# Patient Record
Sex: FEMALE | Race: WHITE | NOT HISPANIC OR LATINO | Employment: FULL TIME | URBAN - METROPOLITAN AREA
[De-identification: names, ages, dates, MRNs, and addresses within clinical notes are randomized per-mention and may not be internally consistent; named-entity substitution may affect disease eponyms.]

---

## 2019-10-11 ENCOUNTER — HOSPITAL ENCOUNTER (EMERGENCY)
Facility: HOSPITAL | Age: 49
Discharge: HOME | End: 2019-10-11
Attending: EMERGENCY MEDICINE

## 2019-10-11 VITALS
HEIGHT: 67 IN | HEART RATE: 74 BPM | WEIGHT: 178 LBS | RESPIRATION RATE: 18 BRPM | SYSTOLIC BLOOD PRESSURE: 150 MMHG | OXYGEN SATURATION: 98 % | TEMPERATURE: 98 F | BODY MASS INDEX: 27.94 KG/M2 | DIASTOLIC BLOOD PRESSURE: 58 MMHG

## 2019-10-11 DIAGNOSIS — S05.02XA ABRASION OF LEFT CORNEA, INITIAL ENCOUNTER: ICD-10-CM

## 2019-10-11 DIAGNOSIS — H10.32 ACUTE CONJUNCTIVITIS OF LEFT EYE, UNSPECIFIED ACUTE CONJUNCTIVITIS TYPE: ICD-10-CM

## 2019-10-11 DIAGNOSIS — H57.12 LEFT EYE PAIN: Primary | ICD-10-CM

## 2019-10-11 PROCEDURE — 25000003 PHARM REV CODE 250: Performed by: PHYSICIAN ASSISTANT

## 2019-10-11 PROCEDURE — 99284 EMERGENCY DEPT VISIT MOD MDM: CPT

## 2019-10-11 RX ORDER — IBUPROFEN 600 MG/1
600 TABLET ORAL EVERY 8 HOURS PRN
Qty: 15 TABLET | Refills: 0 | Status: SHIPPED | OUTPATIENT
Start: 2019-10-11 | End: 2023-04-10

## 2019-10-11 RX ORDER — OFLOXACIN 3 MG/ML
1 SOLUTION/ DROPS OPHTHALMIC EVERY 6 HOURS
Qty: 5 ML | Refills: 0 | Status: SHIPPED | OUTPATIENT
Start: 2019-10-11 | End: 2019-10-16

## 2019-10-11 RX ORDER — PROPARACAINE HYDROCHLORIDE 5 MG/ML
1 SOLUTION/ DROPS OPHTHALMIC
Status: COMPLETED | OUTPATIENT
Start: 2019-10-11 | End: 2019-10-11

## 2019-10-11 RX ORDER — HYDROCODONE BITARTRATE AND ACETAMINOPHEN 5; 325 MG/1; MG/1
1 TABLET ORAL EVERY 6 HOURS PRN
Qty: 3 TABLET | Refills: 0 | Status: SHIPPED | OUTPATIENT
Start: 2019-10-11 | End: 2023-04-10

## 2019-10-11 RX ORDER — OFLOXACIN 3 MG/ML
1 SOLUTION/ DROPS OPHTHALMIC ONCE
Status: COMPLETED | OUTPATIENT
Start: 2019-10-11 | End: 2019-10-11

## 2019-10-11 RX ADMIN — FLUORESCEIN SODIUM 1 EACH: 1 STRIP OPHTHALMIC at 02:10

## 2019-10-11 RX ADMIN — OFLOXACIN 1 DROP: 3 SOLUTION OPHTHALMIC at 03:10

## 2019-10-11 RX ADMIN — PROPARACAINE HYDROCHLORIDE 1 DROP: 5 SOLUTION/ DROPS OPHTHALMIC at 02:10

## 2019-10-11 NOTE — ED PROVIDER NOTES
Encounter Date: 10/11/2019       History     Chief Complaint   Patient presents with    Eye Pain     Reports pain and redness over the past couple days. Denies any known injury. Reports feels as though something is in her eye.      48 y/o female with history of HTN presents to the ER with chief complaint of left eye pain and redness x 2-3 days.  She rates her pain 4/10.  She describes burning pain. She has associated white drainage from the eye, foreign body sensation and photophobia with blurry vision.  She denies flashers or floaters in vision, injury to the eye, or current headache.  She had a headache 2 days ago, but this has resolved.  She denies nausea, vomiting, fever, chills.  The patient wears contacts and denies that she has been wearing them incorrectly.  She denies attempted treatment.  She has no other complaints at this time.        Review of patient's allergies indicates:   Allergen Reactions    Latex, natural rubber      Past Medical History:   Diagnosis Date    Hypertension      Past Surgical History:   Procedure Laterality Date    TUBAL LIGATION       History reviewed. No pertinent family history.  Social History     Tobacco Use    Smoking status: Former Smoker   Substance Use Topics    Alcohol use: Never     Frequency: Never    Drug use: Never     Review of Systems   Constitutional: Negative for chills and fever.   HENT: Negative for sore throat.    Eyes: Positive for photophobia, pain, discharge, redness and visual disturbance. Negative for itching.   Respiratory: Negative for shortness of breath.    Cardiovascular: Negative for chest pain.   Gastrointestinal: Negative for nausea and vomiting.   Genitourinary: Negative for dysuria.   Musculoskeletal: Negative for back pain.   Skin: Negative for color change, rash and wound.   Neurological: Positive for headaches (resolved). Negative for weakness.   Hematological: Does not bruise/bleed easily.   Psychiatric/Behavioral: Negative for  confusion.       Physical Exam     Initial Vitals [10/11/19 1342]   BP Pulse Resp Temp SpO2   (!) 150/58 74 18 98 °F (36.7 °C) 98 %      MAP       --         Physical Exam    Nursing note and vitals reviewed.  Constitutional: She appears well-developed and well-nourished.   HENT:   Head: Atraumatic.   Mouth/Throat: Oropharynx is clear and moist.   Eyes: EOM are normal. Pupils are equal, round, and reactive to light. Lids are everted and swept, no foreign bodies found. No foreign body present in the right eye. No foreign body present in the left eye. Right conjunctiva is not injected. Right conjunctiva has no hemorrhage. Left conjunctiva is injected. Left conjunctiva has no hemorrhage.   Slit lamp exam:       The right eye shows no corneal abrasion, no corneal ulcer, no foreign body, no hyphema, no hypopyon and no fluorescein uptake.        The left eye shows corneal abrasion ( small, 1x1 mm abrasion to 9 oclock positive of left cornea ) and fluorescein uptake. The left eye shows no corneal ulcer, no foreign body, no hyphema and no hypopyon.       tonopen exam left eye = 18,19,21    Right eye = 14, 14, 15   Neck: Normal range of motion. Neck supple.   Cardiovascular: Normal rate and regular rhythm.   Pulmonary/Chest: Breath sounds normal. No respiratory distress. She has no rhonchi. She has no rales.   Neurological: She is alert and oriented to person, place, and time.   Skin: No rash noted.   Psychiatric: She has a normal mood and affect.         ED Course   Procedures  Labs Reviewed - No data to display       Imaging Results    None                APC / Resident Notes:   The patient has a corneal abrasion to the left eye.  The patient has no signs of corneal ulcer, retained foreign body or iritis.  Her pain completely resolved with topical proparacaine.  Vision uncorrected is 20/70 in affected eye. Patient does not have glasses available.   Jacques-Pen exam shows eye pressure within normal limits.  Patient's  evaluation is most consistent with small corneal abrasion with conjunctivitis.  She is a contact lens wearer.  I will treat with ofloxacin.  She is given 1st dose in the ED and prescription for home use x5 days.  Patient is provided with ophthalmology clinic contact number to arrange for follow-up on Monday.  Will treat pain with NSAIDs and #3 tablets of Norco.     Patient is comfortable with plan.  She is given ER return precautions.  I discussed the care this patient with my supervising physician.           Attending Attestation:     Physician Attestation Statement for NP/PA:   I discussed this assessment and plan of this patient with the NP/PA, but I did not personally examine the patient. The face to face encounter was performed by the NP/PA.                     Clinical Impression:       ICD-10-CM ICD-9-CM   1. Left eye pain H57.12 379.91   2. Abrasion of left cornea, initial encounter S05.02XA 918.1   3. Acute conjunctivitis of left eye, unspecified acute conjunctivitis type H10.32 372.00                                LANI Vasques  10/11/19 1528       Rodney Johnson MD  10/11/19 [id number]

## 2023-04-05 ENCOUNTER — HOSPITAL ENCOUNTER (EMERGENCY)
Facility: HOSPITAL | Age: 53
Discharge: HOME | End: 2023-04-05
Attending: INTERNAL MEDICINE
Payer: MEDICAID

## 2023-04-05 VITALS
HEIGHT: 66 IN | TEMPERATURE: 98 F | DIASTOLIC BLOOD PRESSURE: 96 MMHG | WEIGHT: 230 LBS | RESPIRATION RATE: 17 BRPM | SYSTOLIC BLOOD PRESSURE: 159 MMHG | OXYGEN SATURATION: 98 % | HEART RATE: 88 BPM | BODY MASS INDEX: 36.96 KG/M2

## 2023-04-05 DIAGNOSIS — R10.9 ABDOMINAL PAIN, UNSPECIFIED ABDOMINAL LOCATION: Primary | ICD-10-CM

## 2023-04-05 DIAGNOSIS — R79.89 ELEVATED LFTS: ICD-10-CM

## 2023-04-05 DIAGNOSIS — R07.9 CHEST PAIN: ICD-10-CM

## 2023-04-05 DIAGNOSIS — K59.00 CONSTIPATION, UNSPECIFIED CONSTIPATION TYPE: ICD-10-CM

## 2023-04-05 LAB
ALBUMIN SERPL-MCNC: 3.6 G/DL (ref 3.5–5)
ALBUMIN/GLOB SERPL: 0.7 RATIO (ref 1.1–2)
ALP SERPL-CCNC: 100 UNIT/L (ref 40–150)
ALT SERPL-CCNC: 211 UNIT/L (ref 0–55)
AMORPH URATE CRY URNS QL MICRO: ABNORMAL /HPF
APPEARANCE UR: CLEAR
AST SERPL-CCNC: 230 UNIT/L (ref 5–34)
BACTERIA #/AREA URNS AUTO: ABNORMAL /HPF
BASOPHILS # BLD AUTO: 0.04 X10(3)/MCL (ref 0–0.2)
BASOPHILS NFR BLD AUTO: 0.7 %
BILIRUB UR QL STRIP.AUTO: ABNORMAL MG/DL
BILIRUBIN DIRECT+TOT PNL SERPL-MCNC: 0.6 MG/DL
BUN SERPL-MCNC: 10 MG/DL (ref 9.8–20.1)
CALCIUM SERPL-MCNC: 10.3 MG/DL (ref 8.4–10.2)
CHLORIDE SERPL-SCNC: 116 MMOL/L (ref 98–107)
CO2 SERPL-SCNC: 25 MMOL/L (ref 22–29)
COLOR UR AUTO: YELLOW
CREAT SERPL-MCNC: 0.75 MG/DL (ref 0.55–1.02)
EOSINOPHIL # BLD AUTO: 0.24 X10(3)/MCL (ref 0–0.9)
EOSINOPHIL NFR BLD AUTO: 4.4 %
ERYTHROCYTE [DISTWIDTH] IN BLOOD BY AUTOMATED COUNT: 12.3 % (ref 11.5–17)
GFR SERPLBLD CREATININE-BSD FMLA CKD-EPI: >60 MLS/MIN/1.73/M2
GLOBULIN SER-MCNC: 5.3 GM/DL (ref 2.4–3.5)
GLUCOSE SERPL-MCNC: 110 MG/DL (ref 74–100)
GLUCOSE UR QL STRIP.AUTO: NEGATIVE MG/DL
HCT VFR BLD AUTO: 46.7 % (ref 37–47)
HGB BLD-MCNC: 15.6 G/DL (ref 12–16)
IMM GRANULOCYTES # BLD AUTO: 0.02 X10(3)/MCL (ref 0–0.04)
IMM GRANULOCYTES NFR BLD AUTO: 0.4 %
KETONES UR QL STRIP.AUTO: NEGATIVE MG/DL
LEUKOCYTE ESTERASE UR QL STRIP.AUTO: NEGATIVE UNIT/L
LIPASE SERPL-CCNC: 21 U/L
LYMPHOCYTES # BLD AUTO: 1.49 X10(3)/MCL (ref 0.6–4.6)
LYMPHOCYTES NFR BLD AUTO: 27.5 %
MCH RBC QN AUTO: 32.4 PG (ref 27–31)
MCHC RBC AUTO-ENTMCNC: 33.4 G/DL (ref 33–36)
MCV RBC AUTO: 97.1 FL (ref 80–94)
MONOCYTES # BLD AUTO: 0.73 X10(3)/MCL (ref 0.1–1.3)
MONOCYTES NFR BLD AUTO: 13.5 %
MUCOUS THREADS URNS QL MICRO: ABNORMAL /LPF
NEUTROPHILS # BLD AUTO: 2.9 X10(3)/MCL (ref 2.1–9.2)
NEUTROPHILS NFR BLD AUTO: 53.5 %
NITRITE UR QL STRIP.AUTO: NEGATIVE
PH UR STRIP.AUTO: 5.5 [PH]
PLATELET # BLD AUTO: 191 X10(3)/MCL (ref 130–400)
PMV BLD AUTO: 10.2 FL (ref 7.4–10.4)
POTASSIUM SERPL-SCNC: 4.5 MMOL/L (ref 3.5–5.1)
PROT SERPL-MCNC: 8.9 GM/DL (ref 6.4–8.3)
PROT UR QL STRIP.AUTO: ABNORMAL MG/DL
RBC # BLD AUTO: 4.81 X10(6)/MCL (ref 4.2–5.4)
RBC #/AREA URNS AUTO: ABNORMAL /HPF
RBC UR QL AUTO: NEGATIVE UNIT/L
SODIUM SERPL-SCNC: 154 MMOL/L (ref 136–145)
SP GR UR STRIP.AUTO: >=1.03
SQUAMOUS #/AREA URNS AUTO: ABNORMAL /HPF
TROPONIN I SERPL-MCNC: <0.01 NG/ML (ref 0–0.04)
TSH SERPL-ACNC: 3.27 UIU/ML (ref 0.35–4.94)
UROBILINOGEN UR STRIP-ACNC: 2 MG/DL
WBC # SPEC AUTO: 5.4 X10(3)/MCL (ref 4.5–11.5)
WBC #/AREA URNS AUTO: ABNORMAL /HPF

## 2023-04-05 PROCEDURE — 25500020 PHARM REV CODE 255: Performed by: NURSE PRACTITIONER

## 2023-04-05 PROCEDURE — 84443 ASSAY THYROID STIM HORMONE: CPT | Performed by: NURSE PRACTITIONER

## 2023-04-05 PROCEDURE — 81001 URINALYSIS AUTO W/SCOPE: CPT | Performed by: NURSE PRACTITIONER

## 2023-04-05 PROCEDURE — 99285 EMERGENCY DEPT VISIT HI MDM: CPT | Mod: 25

## 2023-04-05 PROCEDURE — 83690 ASSAY OF LIPASE: CPT | Performed by: NURSE PRACTITIONER

## 2023-04-05 PROCEDURE — 80053 COMPREHEN METABOLIC PANEL: CPT | Performed by: NURSE PRACTITIONER

## 2023-04-05 PROCEDURE — 85025 COMPLETE CBC W/AUTO DIFF WBC: CPT | Performed by: NURSE PRACTITIONER

## 2023-04-05 PROCEDURE — 84484 ASSAY OF TROPONIN QUANT: CPT | Performed by: NURSE PRACTITIONER

## 2023-04-05 RX ORDER — LACTULOSE 10 G/15ML
20 SOLUTION ORAL 3 TIMES DAILY
Qty: 270 ML | Refills: 0 | Status: SHIPPED | OUTPATIENT
Start: 2023-04-05 | End: 2023-04-08

## 2023-04-05 RX ADMIN — IOPAMIDOL 100 ML: 755 INJECTION, SOLUTION INTRAVENOUS at 02:04

## 2023-04-05 NOTE — ED PROVIDER NOTES
Encounter Date: 4/5/2023       History     Chief Complaint   Patient presents with    Abdominal Pain     Epigastric pain for a couple days with nausea, denies vomiting.     53-year-old female presents to the emergency department complaints of epigastric pain for couple days with nausea denies any vomiting.  She does report pain to the abdomen that is generalized and does not report any specific point tenderness.  She denies any urinary problems.  She does report trouble with bowels in the last several days including having to strain with even some light blood with wiping as well as incomplete emptying feeling.  She denies any fevers chills.  She reports the pain is a cramping like pain that comes and goes and is sometimes associated with eating but also can just be on an empty stomach.  She denies any other complaints or associated symptoms.  She denies any worsening or alleviating factors.    Review of patient's allergies indicates:   Allergen Reactions    Latex, natural rubber      Past Medical History:   Diagnosis Date    Hypertension      Past Surgical History:   Procedure Laterality Date    TUBAL LIGATION       No family history on file.  Social History     Tobacco Use    Smoking status: Former   Substance Use Topics    Alcohol use: Never    Drug use: Never     Review of Systems   Constitutional:  Negative for activity change, appetite change and fever.   HENT:  Negative for congestion, dental problem and sore throat.    Eyes:  Negative for discharge and itching.   Respiratory:  Negative for apnea, chest tightness and shortness of breath.    Cardiovascular:  Negative for chest pain.   Gastrointestinal:  Positive for abdominal pain, constipation and nausea. Negative for abdominal distention.   Endocrine: Negative for cold intolerance and heat intolerance.   Genitourinary:  Negative for dysuria, vaginal bleeding, vaginal discharge and vaginal pain.   Musculoskeletal:  Negative for back pain.   Skin:  Negative for  rash.   Neurological:  Negative for dizziness, facial asymmetry and weakness.   Hematological:  Does not bruise/bleed easily.   Psychiatric/Behavioral:  Negative for agitation.    All other systems reviewed and are negative.    Physical Exam     Initial Vitals [04/05/23 1108]   BP Pulse Resp Temp SpO2   (!) 152/126 82 18 98.2 °F (36.8 °C) 96 %      MAP       --         Physical Exam    Nursing note and vitals reviewed.  Constitutional: Vital signs are normal. She appears well-developed and well-nourished.  Non-toxic appearance. She does not have a sickly appearance.   HENT:   Head: Normocephalic and atraumatic.   Right Ear: External ear normal.   Left Ear: External ear normal.   Eyes: Conjunctivae, EOM and lids are normal. Lids are everted and swept, no foreign bodies found.   Neck: Trachea normal and phonation normal. Neck supple. No thyroid mass and no thyromegaly present.   Normal range of motion.   Full passive range of motion without pain.     Cardiovascular:  Normal rate, regular rhythm, S1 normal, S2 normal, normal heart sounds, intact distal pulses and normal pulses.           Pulmonary/Chest: Breath sounds normal. No respiratory distress.   Abdominal: Abdomen is soft. She exhibits distension. She exhibits no mass. There is abdominal tenderness.   Distended abdomen with hypoactive bowel sounds.  Patient does report tenderness with palpation but it is noted diffusely throughout the abdomen and there is no localization of pain. There is no rebound and no guarding.   Musculoskeletal:         General: No tenderness or edema. Normal range of motion.      Cervical back: Full passive range of motion without pain, normal range of motion and neck supple.     Lymphadenopathy:     She has no cervical adenopathy.   Neurological: She is alert and oriented to person, place, and time. She has normal strength.   Skin: Skin is warm, dry and intact. Capillary refill takes less than 2 seconds.   Psychiatric: She has a normal  mood and affect. Her speech is normal and behavior is normal. Judgment normal. Cognition and memory are normal.       ED Course   Procedures  Labs Reviewed   COMPREHENSIVE METABOLIC PANEL - Abnormal; Notable for the following components:       Result Value    Sodium Level 154 (*)     Chloride 116 (*)     Glucose Level 110 (*)     Calcium Level Total 10.3 (*)     Protein Total 8.9 (*)     Globulin 5.3 (*)     Albumin/Globulin Ratio 0.7 (*)     Alanine Aminotransferase 211 (*)     Aspartate Aminotransferase 230 (*)     All other components within normal limits   URINALYSIS, REFLEX TO URINE CULTURE - Abnormal; Notable for the following components:    Protein, UA 1+ (*)     Bilirubin, UA 1+ (*)     Urobilinogen, UA 2.0 (*)     All other components within normal limits   CBC WITH DIFFERENTIAL - Abnormal; Notable for the following components:    MCV 97.1 (*)     MCH 32.4 (*)     All other components within normal limits   URINALYSIS, MICROSCOPIC - Abnormal; Notable for the following components:    Mucous, UA Small (*)     Amporphous Urate Crystals, UA Few (*)     Squamous Epithelial Cells, UA Many (*)     All other components within normal limits   LIPASE - Normal   TROPONIN I - Normal   TSH - Normal   CBC W/ AUTO DIFFERENTIAL    Narrative:     The following orders were created for panel order CBC auto differential.  Procedure                               Abnormality         Status                     ---------                               -----------         ------                     CBC with Differential[[id number]]        Abnormal            Final result                 Please view results for these tests on the individual orders.          Imaging Results              CT Abdomen Pelvis With Contrast (Final result)  Result time 04/05/23 15:09:35      Final result by Iván Quiroz MD (04/05/23 15:09:35)                   Impression:      1. Moderately distended gallbladder  2. Diverticulosis coli  3. Atelectasis,  scarring, and calcified granulomas at the lung bases  4. Mild hepatomegaly with heterogeneous low-attenuation within the hepatic parenchyma this may represent steatosis with scattered fatty sparing.  Other etiology cannot be excluded.  A follow-up MR examination would allow further evaluation.  5. Atherosclerosis  6. Thoracolumbar spondylosis with bilateral spondylolysis L5 and grade 1 anterolisthesis of L5 on S1  7. The appendix is not identified with certainty.  A repeat exam with oral contrast would allow further evaluation if clinically indicated      Electronically signed by: Iván Quiroz  Date:    04/05/2023  Time:    15:09               Narrative:    EXAMINATION:  CT ABDOMEN PELVIS WITH CONTRAST    CLINICAL HISTORY:  Abdominal abscess/infection suspected;, .    TECHNIQUE:  PATIENT RADIATION DOSE: DLP(mGycm) : 1033    As per PQRS measures, all CT scans at this facility used dose modulation, iterative reconstruction, and/or weight based dose adjustment when appropriate to reduce radiation dose to as low as reasonably achievable.    COMPARISON:  None available    FINDINGS:  Serial axial images were obtained through the abdomen and pelvis with the administration of  IV contrast. Coronal and sagittal reconstructions where also obtained. Degenerative changes are noted to the thoracolumbar spine.  There is bilateral spondylolysis at L5 and grade 1 anterolisthesis of L5 on S1.  The heart is normal in size.  Atelectasis, scarring, and calcified granulomas are evident at the lung bases.  The liver is mild enlarged and demonstrates patchy heterogeneous parenchymal low-attenuation.  The gallbladder is mildly distended.  The spleen, adrenal glands, and pancreas are grossly within normal limits.  Atherosclerosis is seen within the aorta and branching vessels.  Scattered small lymph nodes seen within the periaortic or pericaval region.  The kidneys are relatively symmetric in size.  There is bilateral renal lobulation.  No  hydronephrosis is seen.  No dilated loops of bowel are identified.  Gas and feces are seen within the colon.  The appendix is not identified with certainty.  Scattered diverticula are noted to the descending and sigmoid colon.  The uterus is normal in size.  There is fluid density present at the endometrium.  There are suspect small follicles at the ovaries bilaterally.  There is mild ill-defined soft tissue prominence at the cervical vaginal region.  A fecal bolus is present within the rectum.  No significant free fluid collection is seen.                                       X-Ray Chest 1 View (Final result)  Result time 04/05/23 14:26:46      Final result by Iván Quiroz MD (04/05/23 14:26:46)                   Impression:      1. No active cardiopulmonary disease identified      Electronically signed by: Iván Quiroz  Date:    04/05/2023  Time:    14:26               Narrative:    EXAMINATION:  XR CHEST 1 VIEW    CLINICAL HISTORY:  , Chest pain, unspecified.    COMPARISON:  None available    FINDINGS:  An AP view or more reveals the heart to be normal in size.  The trachea is midline.  No definite infiltrate or effusion is seen.  Bony structures appear grossly intact.                                       Medications   iopamidoL (ISOVUE-370) injection 100 mL (100 mLs Intravenous Given 4/5/23 1407)                       Medical Decision Making  Patient is a 53-year-old female presents emerged department with abdominal pain mainly epigastric as well as some nausea with no vomiting.  States trouble with bowel movements in the last several days.  Denies any fevers chills.    Problems Addressed:  Abdominal pain, unspecified abdominal location: acute illness or injury     Details: CT was done which showed fecal bolus as well as gas and stool throughout colon.  Will treat for constipation on outpatient basis.  Will refer for LFTs being elevated.  Constipation, unspecified constipation type: acute illness or  injury     Details: Will treat with oral lactulose and discussed over-the-counter p.r.n. use of enema per rectum.  Elevated LFTs: acute illness or injury     Details: Will refer to [facility] for elevated LFT follow-up.    Amount and/or Complexity of Data Reviewed  External Data Reviewed: labs and notes.  Labs: ordered. Decision-making details documented in ED Course.  Radiology: ordered. Decision-making details documented in ED Course.  Discussion of management or test interpretation with external provider(s): Patient had workup including labs and CTs.  No acute findings other than fecal bolus and constipation seen.  Did discuss elevated LFTs and fatty liver seen on CT with patient and referred for primary care follow-up.  Discussed lactulose for constipation as well as over-the-counter enemas.  Patient was aware plan of care and had no questions concerns prior to discharge.            Clinical Impression:   Final diagnoses:  [R10.9] Abdominal pain, unspecified abdominal location (Primary)  [K59.00] Constipation, unspecified constipation type  [R79.89] Elevated LFTs        ED Disposition Condition    Discharge Stable          ED Prescriptions       Medication Sig Dispense Start Date End Date Auth. Provider    lactulose (CHRONULAC) 20 gram/30 mL Soln Take 30 mLs (20 g total) by mouth 3 (three) times daily. for 3 days 270 mL 4/5/2023 4/8/2023 CLEMENTE Li          Follow-up Information       Follow up With Specialties Details Why Contact Info Additional Information    [facility] [place] as soon as possible for a visit  As needed, For ER Follow Up. [address] [facility] [id number] [phone] [facility]             CLEMENTE Li  04/05/23 0517

## 2023-04-10 ENCOUNTER — OFFICE VISIT (OUTPATIENT)
Dept: FAMILY MEDICINE | Facility: CLINIC | Age: 53
End: 2023-04-10
Payer: MEDICAID

## 2023-04-10 VITALS
SYSTOLIC BLOOD PRESSURE: 136 MMHG | TEMPERATURE: 98 F | DIASTOLIC BLOOD PRESSURE: 82 MMHG | HEIGHT: 66 IN | HEART RATE: 76 BPM | RESPIRATION RATE: 20 BRPM | BODY MASS INDEX: 39.15 KG/M2 | WEIGHT: 243.63 LBS | OXYGEN SATURATION: 98 %

## 2023-04-10 DIAGNOSIS — Z12.4 ENCOUNTER FOR PAPANICOLAOU SMEAR FOR CERVICAL CANCER SCREENING: ICD-10-CM

## 2023-04-10 DIAGNOSIS — Z00.00 WELLNESS EXAMINATION: ICD-10-CM

## 2023-04-10 DIAGNOSIS — R10.9 ABDOMINAL PAIN, UNSPECIFIED ABDOMINAL LOCATION: ICD-10-CM

## 2023-04-10 DIAGNOSIS — I10 HYPERTENSION, UNSPECIFIED TYPE: ICD-10-CM

## 2023-04-10 DIAGNOSIS — R05.9 COUGH, UNSPECIFIED TYPE: ICD-10-CM

## 2023-04-10 DIAGNOSIS — B35.1 NAIL FUNGUS: ICD-10-CM

## 2023-04-10 DIAGNOSIS — Z76.89 ESTABLISHING CARE WITH NEW DOCTOR, ENCOUNTER FOR: Primary | ICD-10-CM

## 2023-04-10 DIAGNOSIS — Z87.898 HISTORY OF NASAL CONGESTION: ICD-10-CM

## 2023-04-10 DIAGNOSIS — R79.89 ELEVATED LFTS: ICD-10-CM

## 2023-04-10 DIAGNOSIS — Z12.39 ENCOUNTER FOR SCREENING FOR MALIGNANT NEOPLASM OF BREAST, UNSPECIFIED SCREENING MODALITY: ICD-10-CM

## 2023-04-10 DIAGNOSIS — K59.00 CONSTIPATION, UNSPECIFIED CONSTIPATION TYPE: ICD-10-CM

## 2023-04-10 DIAGNOSIS — R09.82 POST-NASAL DRIP: ICD-10-CM

## 2023-04-10 PROCEDURE — 99204 PR OFFICE/OUTPT VISIT, NEW, LEVL IV, 45-59 MIN: ICD-10-PCS | Mod: S$PBB,,, | Performed by: REGISTERED NURSE

## 2023-04-10 PROCEDURE — 3075F SYST BP GE 130 - 139MM HG: CPT | Mod: CPTII,,, | Performed by: REGISTERED NURSE

## 2023-04-10 PROCEDURE — 3008F PR BODY MASS INDEX (BMI) DOCUMENTED: ICD-10-PCS | Mod: CPTII,,, | Performed by: REGISTERED NURSE

## 2023-04-10 PROCEDURE — 96372 THER/PROPH/DIAG INJ SC/IM: CPT | Mod: PBBFAC

## 2023-04-10 PROCEDURE — 3075F PR MOST RECENT SYSTOLIC BLOOD PRESS GE 130-139MM HG: ICD-10-PCS | Mod: CPTII,,, | Performed by: REGISTERED NURSE

## 2023-04-10 PROCEDURE — 99215 OFFICE O/P EST HI 40 MIN: CPT | Mod: PBBFAC,25 | Performed by: REGISTERED NURSE

## 2023-04-10 PROCEDURE — 3079F DIAST BP 80-89 MM HG: CPT | Mod: CPTII,,, | Performed by: REGISTERED NURSE

## 2023-04-10 PROCEDURE — 99204 OFFICE O/P NEW MOD 45 MIN: CPT | Mod: S$PBB,,, | Performed by: REGISTERED NURSE

## 2023-04-10 PROCEDURE — 99999 PR PBB SHADOW E&M-EST. PATIENT-LVL V: CPT | Mod: PBBFAC,,, | Performed by: REGISTERED NURSE

## 2023-04-10 PROCEDURE — 99999 PR PBB SHADOW E&M-EST. PATIENT-LVL V: ICD-10-PCS | Mod: PBBFAC,,, | Performed by: REGISTERED NURSE

## 2023-04-10 PROCEDURE — 3079F PR MOST RECENT DIASTOLIC BLOOD PRESSURE 80-89 MM HG: ICD-10-PCS | Mod: CPTII,,, | Performed by: REGISTERED NURSE

## 2023-04-10 PROCEDURE — 3008F BODY MASS INDEX DOCD: CPT | Mod: CPTII,,, | Performed by: REGISTERED NURSE

## 2023-04-10 RX ORDER — METOPROLOL SUCCINATE 25 MG/1
25 TABLET, EXTENDED RELEASE ORAL
COMMUNITY
Start: 2022-12-07 | End: 2023-04-10 | Stop reason: SDUPTHER

## 2023-04-10 RX ORDER — BETAMETHASONE SODIUM PHOSPHATE AND BETAMETHASONE ACETATE 3; 3 MG/ML; MG/ML
12 INJECTION, SUSPENSION INTRA-ARTICULAR; INTRALESIONAL; INTRAMUSCULAR; SOFT TISSUE
Status: COMPLETED | OUTPATIENT
Start: 2023-04-10 | End: 2023-04-10

## 2023-04-10 RX ORDER — LACTULOSE 10 G/15ML
SOLUTION ORAL
COMMUNITY
Start: 2023-04-05 | End: 2023-07-17

## 2023-04-10 RX ORDER — AZITHROMYCIN 250 MG/1
TABLET, FILM COATED ORAL
Qty: 6 TABLET | Refills: 0 | Status: SHIPPED | OUTPATIENT
Start: 2023-04-10 | End: 2023-04-15

## 2023-04-10 RX ORDER — METOPROLOL SUCCINATE 25 MG/1
25 TABLET, EXTENDED RELEASE ORAL DAILY
Qty: 30 TABLET | Refills: 3 | Status: SHIPPED | OUTPATIENT
Start: 2023-04-10 | End: 2023-08-11

## 2023-04-10 RX ADMIN — BETAMETHASONE SODIUM PHOSPHATE AND BETAMETHASONE ACETATE 12 MG: 3; 3 INJECTION, SUSPENSION INTRA-ARTICULAR; INTRALESIONAL; INTRAMUSCULAR at 11:04

## 2023-04-10 NOTE — PROGRESS NOTES
Subjective     Patient ID: Raquel Byrne is a 53 y.o. female.    Chief Complaint: Establish Care (Er follow up abdominal pain and possible covid. Unable to pass bm. Last good bm was Friday. Taking stool softners), Hypertension (Needs medication refills unable to fill for a month), Cough, Sinus Problem, Sore Throat, decrease smell and taste, and Headache    Patient is a 53-year-old female here today to establish care.  Patient has past medical history of obesity and hypertension.  Patient complaining of cough, congestion, headache, postnasal drip and sore throat x1 week.  Patient denies fever, chills and malaise at this time.  Denies recent sick contact.  Patient was seen in ER on April 5th for abdominal and chest pain CT of abdomen was completed which revealed hepatomegaly and constipation, liver enzymes elevated.  Patient denies pain/Tylenol use alcohol use and/or history of hep C.  Patient complaining of mid gastric abdominal pain, denies nausea and vomiting at this time.    Cough    Sinus Problem    Sore Throat     Headache     Review of Systems       Objective     Physical Exam  Constitutional:       Appearance: Normal appearance. She is obese.   HENT:      Head: Normocephalic and atraumatic.      Right Ear: Hearing, tympanic membrane, ear canal and external ear normal.      Left Ear: Hearing, tympanic membrane, ear canal and external ear normal.      Nose: Congestion present.      Right Sinus: Frontal sinus tenderness present.      Left Sinus: Frontal sinus tenderness present.      Mouth/Throat:      Lips: Pink.      Mouth: Mucous membranes are moist.      Pharynx: Posterior oropharyngeal erythema present. No oropharyngeal exudate.      Tonsils: No tonsillar exudate or tonsillar abscesses.   Eyes:      Pupils: Pupils are equal, round, and reactive to light.   Cardiovascular:      Rate and Rhythm: Regular rhythm.      Pulses: Normal pulses.      Heart sounds: Normal heart sounds.   Pulmonary:      Effort:  Pulmonary effort is normal.      Breath sounds: Normal breath sounds.   Abdominal:      General: Abdomen is flat. Bowel sounds are normal. There is distension.      Palpations: Abdomen is soft. There is no mass.      Tenderness: There is abdominal tenderness. There is no rebound.      Hernia: No hernia is present.   Musculoskeletal:         General: Normal range of motion.      Cervical back: Normal range of motion and neck supple.   Feet:      Right foot:      Toenail Condition: Right toenails are abnormally thick. Fungal disease present.     Left foot:      Toenail Condition: Left toenails are abnormally thick. Fungal disease present.  Skin:     General: Skin is warm.      Capillary Refill: Capillary refill takes less than 2 seconds.   Neurological:      General: No focal deficit present.      Mental Status: She is alert and oriented to person, place, and time.        Assessment and Plan     Problem List Items Addressed This Visit          ENT    Post-nasal drip    Relevant Medications    azithromycin (Z-RIGO) 250 MG tablet       GI    Elevated LFTs    Relevant Orders    Ambulatory referral/consult to Gastroenterology    AST (SGOT)    ALT (SGPT)    Hepatitis C Antibody       Other    Establishing care with new doctor, encounter for - Primary     Other Visit Diagnoses       Abdominal pain, unspecified abdominal location        Relevant Orders    Ambulatory referral/consult to Gastroenterology    Encounter for screening for malignant neoplasm of breast, unspecified screening modality        Relevant Orders    Mammo Digital Screening Bilat w/ Tom    Hypertension, unspecified type        Relevant Medications    metoprolol succinate (TOPROL-XL) 25 MG 24 hr tablet    Cough, unspecified type        Relevant Medications    betamethasone acetate-betamethasone sodium phosphate injection 12 mg (Completed)    azithromycin (Z-RIGO) 250 MG tablet    History of nasal congestion        Relevant Medications    betamethasone  acetate-betamethasone sodium phosphate injection 12 mg (Completed)    azithromycin (Z-RIGO) 250 MG tablet    Wellness examination        Relevant Orders    Lipid Panel    Hemoglobin A1C    HIV 1/2 Ag/Ab (4th Gen)    Encounter for Papanicolaou smear for cervical cancer screening        Relevant Orders    Ambulatory referral/consult to Gynecology    Constipation, unspecified constipation type        Relevant Medications    linaCLOtide (LINZESS) 145 mcg Cap capsule    Nail fungus        Relevant Orders    Ambulatory referral/consult to Podiatry        Hypertension-blood pressure controlled on current medication regimen, continue.  Medication refills sent to pharmacy on file.  Low-sodium diet encouraged.    Obesity-Body mass index is 39.32 kg/m². Morbid obesity complicates all aspects of disease management from diagnostic modalities to treatment. Weight loss encouraged and health benefits explained to patient.      Cough, nasal congestion, postnasal drip-betamethasone 12 mg IM given in clinic, Z-Rigo sent to pharmacy on file, take as directed.  Use OTC Claritin or Zyrtec as directed.  Increase fluids as tolerated    Constipation-increase water intake and fiber in diet, Linzess 145 mcg p.o. q.day sent to pharmacy on file, take as directed.  GI referral sent.     Nail fungus-podiatry referral sent today    Elevated liver enzymes-patient instructed to stay away from Tylenol containing products, repeat liver enzymes to be drawn in 2 weeks, GI referral sent today.    Return to clinic in 2 weeks for wellness, labs to be completed prior to visit.

## 2023-04-21 ENCOUNTER — LAB VISIT (OUTPATIENT)
Dept: LAB | Facility: HOSPITAL | Age: 53
End: 2023-04-21
Attending: REGISTERED NURSE
Payer: MEDICAID

## 2023-04-21 DIAGNOSIS — Z00.00 WELLNESS EXAMINATION: ICD-10-CM

## 2023-04-21 DIAGNOSIS — R79.89 ELEVATED LFTS: ICD-10-CM

## 2023-04-21 LAB
ALT SERPL-CCNC: 150 UNIT/L (ref 0–55)
AST SERPL-CCNC: 101 UNIT/L (ref 5–34)
CHOLEST SERPL-MCNC: 209 MG/DL
CHOLEST/HDLC SERPL: 4 {RATIO} (ref 0–5)
EST. AVERAGE GLUCOSE BLD GHB EST-MCNC: 171.4 MG/DL
HBA1C MFR BLD: 7.6 %
HCV AB SERPL QL IA: NONREACTIVE
HDLC SERPL-MCNC: 59 MG/DL (ref 35–60)
HIV 1+2 AB+HIV1 P24 AG SERPL QL IA: NONREACTIVE
LDLC SERPL CALC-MCNC: 134 MG/DL (ref 50–140)
TRIGL SERPL-MCNC: 81 MG/DL (ref 37–140)
VLDLC SERPL CALC-MCNC: 16 MG/DL

## 2023-04-21 PROCEDURE — 80061 LIPID PANEL: CPT

## 2023-04-21 PROCEDURE — 86803 HEPATITIS C AB TEST: CPT

## 2023-04-21 PROCEDURE — 84450 TRANSFERASE (AST) (SGOT): CPT

## 2023-04-21 PROCEDURE — 36415 COLL VENOUS BLD VENIPUNCTURE: CPT

## 2023-04-21 PROCEDURE — 84460 ALANINE AMINO (ALT) (SGPT): CPT

## 2023-04-21 PROCEDURE — 87389 HIV-1 AG W/HIV-1&-2 AB AG IA: CPT

## 2023-04-21 PROCEDURE — 83036 HEMOGLOBIN GLYCOSYLATED A1C: CPT

## 2023-04-24 ENCOUNTER — OFFICE VISIT (OUTPATIENT)
Dept: FAMILY MEDICINE | Facility: CLINIC | Age: 53
End: 2023-04-24
Payer: MEDICAID

## 2023-04-24 VITALS
BODY MASS INDEX: 38.94 KG/M2 | HEART RATE: 74 BPM | HEIGHT: 66 IN | WEIGHT: 242.31 LBS | SYSTOLIC BLOOD PRESSURE: 138 MMHG | TEMPERATURE: 98 F | RESPIRATION RATE: 18 BRPM | DIASTOLIC BLOOD PRESSURE: 85 MMHG | OXYGEN SATURATION: 99 %

## 2023-04-24 DIAGNOSIS — R10.10 PAIN OF UPPER ABDOMEN: ICD-10-CM

## 2023-04-24 DIAGNOSIS — B35.1 NAIL FUNGUS: ICD-10-CM

## 2023-04-24 DIAGNOSIS — Z00.00 WELLNESS EXAMINATION: ICD-10-CM

## 2023-04-24 DIAGNOSIS — R45.86 REBOUND MOOD SWINGS: ICD-10-CM

## 2023-04-24 DIAGNOSIS — E66.9 OBESITY (BMI 35.0-39.9 WITHOUT COMORBIDITY): ICD-10-CM

## 2023-04-24 DIAGNOSIS — E11.9 TYPE 2 DIABETES MELLITUS WITHOUT COMPLICATION, WITHOUT LONG-TERM CURRENT USE OF INSULIN: Primary | ICD-10-CM

## 2023-04-24 PROCEDURE — 3008F BODY MASS INDEX DOCD: CPT | Mod: CPTII,,, | Performed by: REGISTERED NURSE

## 2023-04-24 PROCEDURE — 3008F PR BODY MASS INDEX (BMI) DOCUMENTED: ICD-10-PCS | Mod: CPTII,,, | Performed by: REGISTERED NURSE

## 2023-04-24 PROCEDURE — 99999 PR PBB SHADOW E&M-EST. PATIENT-LVL IV: ICD-10-PCS | Mod: PBBFAC,,, | Performed by: REGISTERED NURSE

## 2023-04-24 PROCEDURE — 1160F RVW MEDS BY RX/DR IN RCRD: CPT | Mod: CPTII,,, | Performed by: REGISTERED NURSE

## 2023-04-24 PROCEDURE — 99999 PR PBB SHADOW E&M-EST. PATIENT-LVL IV: CPT | Mod: PBBFAC,,, | Performed by: REGISTERED NURSE

## 2023-04-24 PROCEDURE — 1159F MED LIST DOCD IN RCRD: CPT | Mod: CPTII,,, | Performed by: REGISTERED NURSE

## 2023-04-24 PROCEDURE — 1159F PR MEDICATION LIST DOCUMENTED IN MEDICAL RECORD: ICD-10-PCS | Mod: CPTII,,, | Performed by: REGISTERED NURSE

## 2023-04-24 PROCEDURE — 3075F SYST BP GE 130 - 139MM HG: CPT | Mod: CPTII,,, | Performed by: REGISTERED NURSE

## 2023-04-24 PROCEDURE — 99215 PR OFFICE/OUTPT VISIT, EST, LEVL V, 40-54 MIN: ICD-10-PCS | Mod: S$PBB,,, | Performed by: REGISTERED NURSE

## 2023-04-24 PROCEDURE — 99214 OFFICE O/P EST MOD 30 MIN: CPT | Mod: PBBFAC | Performed by: REGISTERED NURSE

## 2023-04-24 PROCEDURE — 1160F PR REVIEW ALL MEDS BY PRESCRIBER/CLIN PHARMACIST DOCUMENTED: ICD-10-PCS | Mod: CPTII,,, | Performed by: REGISTERED NURSE

## 2023-04-24 PROCEDURE — 3079F DIAST BP 80-89 MM HG: CPT | Mod: CPTII,,, | Performed by: REGISTERED NURSE

## 2023-04-24 PROCEDURE — 3079F PR MOST RECENT DIASTOLIC BLOOD PRESSURE 80-89 MM HG: ICD-10-PCS | Mod: CPTII,,, | Performed by: REGISTERED NURSE

## 2023-04-24 PROCEDURE — 3075F PR MOST RECENT SYSTOLIC BLOOD PRESS GE 130-139MM HG: ICD-10-PCS | Mod: CPTII,,, | Performed by: REGISTERED NURSE

## 2023-04-24 PROCEDURE — 99215 OFFICE O/P EST HI 40 MIN: CPT | Mod: S$PBB,,, | Performed by: REGISTERED NURSE

## 2023-04-24 RX ORDER — ADHESIVE BANDAGE
2400 BANDAGE TOPICAL DAILY PRN
COMMUNITY
End: 2023-07-17

## 2023-04-24 RX ORDER — METFORMIN HYDROCHLORIDE 500 MG/1
500 TABLET ORAL 2 TIMES DAILY WITH MEALS
Qty: 180 TABLET | Refills: 0 | Status: SHIPPED | OUTPATIENT
Start: 2023-04-24 | End: 2023-07-26

## 2023-04-24 NOTE — PROGRESS NOTES
Subjective     Patient ID: Raquel Byrne is a 53 y.o. female.    Chief Complaint: Follow-up (Two week follow up with lab results ), Abdominal Pain (Pt c/o intermittent epigastric pain (cramping) that radiated to RLQ and R shoulder x 4 weeks/Aggravated by movement and palpating area and alleviated by bowel movement and rest ), and Nausea (Pt c/o nausea due to abdominal pain/Denies vomiting )    Patient is a 53-year-old female here today for wellness exam, labs to be reviewed with patient in office.  Patient still complaining of epigastric pain that radiates to right shoulder greater than 3 months, patient denies fever, chills, and malaise at this time.    Follow-up  This is a recurrent problem. The problem occurs intermittently. The problem has been unchanged. Associated symptoms include abdominal pain and nausea. Pertinent negatives include no chills, fatigue or fever. The treatment provided mild relief.   Abdominal Pain  The pain is located in the epigastric region. The quality of the pain is dull. The abdominal pain radiates to the right shoulder. Associated symptoms include nausea. Pertinent negatives include no fever.   Nausea  Associated symptoms include abdominal pain and nausea. Pertinent negatives include no chills, fatigue or fever.   Review of Systems   Constitutional:  Negative for chills, fatigue and fever.   Gastrointestinal:  Positive for abdominal pain and nausea.   Endocrine: Positive for polydipsia. Negative for polyphagia and polyuria.   Psychiatric/Behavioral:  Positive for dysphoric mood.         Objective     Physical Exam  Vitals reviewed.   Constitutional:       Appearance: Normal appearance. She is obese.   HENT:      Head: Normocephalic and atraumatic.      Right Ear: Tympanic membrane normal.      Left Ear: Tympanic membrane normal.      Nose: Nose normal.      Mouth/Throat:      Mouth: Mucous membranes are moist.   Eyes:      Pupils: Pupils are equal, round, and reactive to light.    Cardiovascular:      Rate and Rhythm: Normal rate and regular rhythm.      Pulses: Normal pulses.      Heart sounds: Normal heart sounds.   Pulmonary:      Effort: Pulmonary effort is normal.      Breath sounds: Normal breath sounds.   Abdominal:      General: Abdomen is flat. Bowel sounds are normal.      Palpations: Abdomen is soft.      Tenderness: There is abdominal tenderness.   Musculoskeletal:         General: Normal range of motion.      Cervical back: Normal range of motion and neck supple.   Skin:     General: Skin is warm.      Capillary Refill: Capillary refill takes less than 2 seconds.   Neurological:      General: No focal deficit present.      Mental Status: She is alert and oriented to person, place, and time.   Psychiatric:         Attention and Perception: Attention normal.         Mood and Affect: Affect is angry and tearful.         Speech: Speech normal.         Behavior: Behavior is agitated. Behavior is cooperative.         Thought Content: Thought content does not include homicidal or suicidal ideation. Thought content does not include homicidal or suicidal plan.         Cognition and Memory: Cognition normal.         Judgment: Judgment normal.        Assessment and Plan     Problem List Items Addressed This Visit          Psychiatric    Rebound mood swings       Endocrine    Type 2 diabetes mellitus without complication, without long-term current use of insulin - Primary    Relevant Medications    metFORMIN (GLUCOPHAGE) 500 MG tablet    Obesity (BMI 35.0-39.9 without comorbidity)     Other Visit Diagnoses       Wellness examination            I spent a total of 45 minutes on the day of the visit.This includes face to face time and non-face to face time preparing to see the patient (eg, review of tests), obtaining and/or reviewing separately obtained history, documenting clinical information in the electronic or other health record, independently interpreting results and communicating  results to the patient/family/caregiver, or care coordinator.    Wellness exam-healthy lifestyle discussed with patient, labs reviewed today.    Obesity-Body mass index is 39.11 kg/m². Morbid obesity complicates all aspects of disease management from diagnostic modalities to treatment. Weight loss encouraged and health benefits explained to patient.     Diabetes- We understand that controlling diabetes can feel overwhelming at times. We are here to offer the tools and support to help you manage your diabetes and meet your health goals. Please reference the meal planning guide below.     Diabetic Meal Planning    About this topic  Healthy eating is an important part of keeping your diabetes in control. A balanced diet along with your diabetic drugs will help you control your blood sugar. The right portions of healthy foods may help keep your sugar within your goal range. It may also help to lower or maintain your weight, manage cholesterol, the amount of fat in your blood, and blood pressure.      Image(s)    What will the results be?  Healthy eating may help you lower your blood sugar and keep it in a safe range. Keeping your blood sugar in a safe range may lower your chances for long term problems from your diabetes. You may be less likely to have damage to your kidneys, heart, eyes, or nerves.    What changes to diet are needed?  Healthy eating means:  Eating a range of foods from all food groups.  Eating the right size portions. Read the nutrition facts on each food you eat.  Eating meals and snacks at the same time each day. Do not skip a meal or snack. Skipping meals may cause your blood sugar to go too low, especially if you are on drugs for your diabetes. Skipping meals can also cause you to eat too much at the next meal.  Talk to your diabetes educator about making a personal meal plan for you. They can also help you eat the foods you like by modifying them to be healthier.    Who should use this diet?  This  diet is helpful to people with high blood sugar or diabetes.    What foods are good to eat?  It is important to make a healthy meal. Eat a variety of different foods in the right portion.  Fill half of your plate with non-starchy vegetables. Non-starchy vegetables include: Broccoli, green beans, carrots, greens (marissa, kale, mustard, turnip), onions, tomatoes, asparagus, beets, cauliflower, celery, and cucumber. Non-starchy vegetables are high in fiber and low in carbohydrates. These will help keep you full for longer without raising your blood sugar.  Fill 1/4 of your plate with carbohydrates. Try to choose whole grain options. Whole-grain products have more fiber which will make you feel full. Carbohydrates include: Bread, rice, pasta, and starchy vegetables. Starchy vegetables include beans, potatoes, acorn squash, butternut squash, corn, and peas.  Fill 1/4 of your plate with protein. Choose low-fat cuts of meat like boneless, skinless chicken breast; pork loin; 90% lean beef; lean and skinless turkey meat; and fresh fish (not fried) such as tuna, salmon, or mackerel.  Add a low-fat or non-fat dairy product like 8 ounces (240 mL) of 1% or skim milk or 6 ounces (180 mL) of low-fat yogurt. Eat the recommended serving. Milk and yogurt have carbohydrates which raise your blood sugar.  Add a small piece of fruit or 1/2 cup (120 grams) of frozen or canned fruit. Choose canned fruits in juice or water. Fruits include apples, bananas, peaches, pears, pineapple, plums, and oranges. Eat the recommended serving. Fruit has carbohydrates which can raise your blood sugar.  Include healthy fats in your meal like olive oil, canola oil, avocado, and nuts.  Other good foods to include in your diet are:  Foods high in fiber. Adding fiber to your meals may help control your blood sugar and cholesterol levels. It may also help with weight loss and prevent belly problems. If you are younger than 50, it is recommended to get 25 to  38 grams per day. If you are older than 50, it is recommended to get 21 to 30 grams per day. Good sources of fiber include:  Nuts and seeds  Beans, peas, and other legumes  Whole-grain products  Fruits  Vegetables  Smart snacks in the right portion. Do not go too long in between meals. Ask your dietitian when you should have a snack in between your meals. Snack on things like:  Nuts  Vegetables and low-fat dressing  Light popcorn  Low-fat cheese and crackers  1/2 sandwich    What foods should be limited or avoided?  You may need to limit the amount of some foods you eat and how often you eat them. Foods that should be limited include:  High fat or processed foods like:  Peter  Sausage  Hot dogs  Whole-fat dairy products  Potato chips  Foods high in sodium like:  Canned soups  Soy sauce and some salad dressings  Cured meats  Salted snack foods like pretzels  Olives  Fats and oils like:  Margarine  Salad dressing  Gravy  Lard or shortening  Foods high in sugar like:  Candy  Cookies  Cake  Ice cream  Table sugar  Soda  Juice drinks  Starches that are not whole grain like:  White rice  French fries  White pasta  White bread  Sugary cereals  Baked goods, pastries, croissants  Beer, wine, and mixed drinks (alcohol). Drink alcohol in moderation (1 drink per day or less for adult women and 2 drinks per day or less for adult men). Drinking alcohol can cause fluctuations in your blood sugar and interfere with how your diabetic drugs work. Talk to your doctor about how you can safely include alcohol into your diet.    What can be done to prevent this health problem?  Some people have a higher chance of developing diabetes than others. This is a life-long problem. You can still lead a normal life. Diabetes can be managed through diet, exercise, and drugs. It is important to have support from your family and friends to help you with your goals.    When do I need to call the doctor?  Blood sugar level is above 240 mg/dL for more  than a day  Blood sugar level drops to less than 40 and does not respond to 15 grams of simple carbohydrate, like 4 glucose tablets or 1/2 cup (120 mL) of fruit juice  Trouble breathing  Very sleepy and trouble concentrating  Feeling very thirsty  Needing to urinate (pee) more than normal  Throw up more than once or have many loose stools  You are so sick you cannot eat or drink  Fever over 101°F (38°C)  Questions about your diet plan  You are not feeling better in 2 to 3 days or you are feeling worse    Hemoglobin A1c   Date Value Ref Range Status   04/21/2023 7.6 (H) <=7.0 % Final     Metformin 500 mg b.i.d. sent to pharmacy on file with instructions, hemoglobin A1c to be redrawn prior to next visit in 3 months    Mood swings-patient referred to Mental Health NP for diagnosis and treatment    Abdominal pain-GI referral sent today, liver enzymes were elevated today patient instructed to DC use of Tylenol, do not drink alcohol, follow-up with GI.    Return to clinic in 3 months for follow-up, hemoglobin A1c/microalbumin to be drawn prior to visit

## 2023-05-01 ENCOUNTER — PATIENT MESSAGE (OUTPATIENT)
Dept: ADMINISTRATIVE | Facility: HOSPITAL | Age: 53
End: 2023-05-01
Payer: MEDICAID

## 2023-05-18 ENCOUNTER — TELEPHONE (OUTPATIENT)
Dept: OBSTETRICS AND GYNECOLOGY | Facility: CLINIC | Age: 53
End: 2023-05-18
Payer: MEDICAID

## 2023-05-30 ENCOUNTER — PATIENT MESSAGE (OUTPATIENT)
Dept: ADMINISTRATIVE | Facility: HOSPITAL | Age: 53
End: 2023-05-30
Payer: MEDICAID

## 2023-06-26 ENCOUNTER — PATIENT OUTREACH (OUTPATIENT)
Dept: ADMINISTRATIVE | Facility: HOSPITAL | Age: 53
End: 2023-06-26
Payer: MEDICAID

## 2023-06-26 ENCOUNTER — PATIENT MESSAGE (OUTPATIENT)
Dept: ADMINISTRATIVE | Facility: HOSPITAL | Age: 53
End: 2023-06-26
Payer: MEDICAID

## 2023-06-26 NOTE — PROGRESS NOTES
Population Health. [facility]. Reviewing patient's chart for quality metrics. I contacted patient to see if she has had a recent mmg, colonoscopy, or pap smear. Patient has not had any recent tests.

## 2023-07-17 DIAGNOSIS — R87.610 ASCUS WITH POSITIVE HIGH RISK HPV CERVICAL: Primary | ICD-10-CM

## 2023-07-17 DIAGNOSIS — R87.810 ASCUS WITH POSITIVE HIGH RISK HPV CERVICAL: Primary | ICD-10-CM

## 2023-07-17 DIAGNOSIS — K59.00 CONSTIPATION, UNSPECIFIED CONSTIPATION TYPE: ICD-10-CM

## 2023-07-17 RX ORDER — LINACLOTIDE 145 UG/1
CAPSULE, GELATIN COATED ORAL
Qty: 30 CAPSULE | Refills: 0 | Status: SHIPPED | OUTPATIENT
Start: 2023-07-17 | End: 2023-08-07 | Stop reason: SDUPTHER

## 2023-07-24 ENCOUNTER — PATIENT MESSAGE (OUTPATIENT)
Dept: ADMINISTRATIVE | Facility: HOSPITAL | Age: 53
End: 2023-07-24
Payer: MEDICAID

## 2023-07-25 ENCOUNTER — LAB VISIT (OUTPATIENT)
Dept: LAB | Facility: HOSPITAL | Age: 53
End: 2023-07-25
Attending: REGISTERED NURSE
Payer: MEDICAID

## 2023-07-25 DIAGNOSIS — E11.9 TYPE 2 DIABETES MELLITUS WITHOUT COMPLICATION, WITHOUT LONG-TERM CURRENT USE OF INSULIN: ICD-10-CM

## 2023-07-25 LAB
CREAT UR-MCNC: 196.2 MG/DL (ref 45–106)
EST. AVERAGE GLUCOSE BLD GHB EST-MCNC: 142.7 MG/DL
HBA1C MFR BLD: 6.6 %
MICROALBUMIN UR-MCNC: 27.4 UG/ML
MICROALBUMIN/CREAT RATIO PNL UR: 14 MG/GM CR (ref 0–30)

## 2023-07-25 PROCEDURE — 82043 UR ALBUMIN QUANTITATIVE: CPT

## 2023-07-25 PROCEDURE — 83036 HEMOGLOBIN GLYCOSYLATED A1C: CPT

## 2023-07-25 PROCEDURE — 36415 COLL VENOUS BLD VENIPUNCTURE: CPT

## 2023-07-26 ENCOUNTER — OFFICE VISIT (OUTPATIENT)
Dept: FAMILY MEDICINE | Facility: CLINIC | Age: 53
End: 2023-07-26
Payer: MEDICAID

## 2023-07-26 VITALS
HEIGHT: 66 IN | RESPIRATION RATE: 18 BRPM | OXYGEN SATURATION: 98 % | HEART RATE: 62 BPM | TEMPERATURE: 98 F | DIASTOLIC BLOOD PRESSURE: 86 MMHG | BODY MASS INDEX: 38.22 KG/M2 | WEIGHT: 237.81 LBS | SYSTOLIC BLOOD PRESSURE: 129 MMHG

## 2023-07-26 DIAGNOSIS — R10.9 ABDOMINAL PAIN, UNSPECIFIED ABDOMINAL LOCATION: ICD-10-CM

## 2023-07-26 DIAGNOSIS — B37.89 CANDIDIASIS OF BREAST: ICD-10-CM

## 2023-07-26 DIAGNOSIS — E66.9 OBESITY (BMI 35.0-39.9 WITHOUT COMORBIDITY): ICD-10-CM

## 2023-07-26 DIAGNOSIS — R11.2 NAUSEA AND VOMITING, UNSPECIFIED VOMITING TYPE: ICD-10-CM

## 2023-07-26 DIAGNOSIS — R79.89 ELEVATED LFTS: ICD-10-CM

## 2023-07-26 DIAGNOSIS — Z12.4 CERVICAL CANCER SCREENING: Primary | ICD-10-CM

## 2023-07-26 DIAGNOSIS — Z12.11 COLON CANCER SCREENING: ICD-10-CM

## 2023-07-26 PROCEDURE — 3008F BODY MASS INDEX DOCD: CPT | Mod: CPTII,,, | Performed by: REGISTERED NURSE

## 2023-07-26 PROCEDURE — 1160F PR REVIEW ALL MEDS BY PRESCRIBER/CLIN PHARMACIST DOCUMENTED: ICD-10-PCS | Mod: CPTII,,, | Performed by: REGISTERED NURSE

## 2023-07-26 PROCEDURE — 3044F HG A1C LEVEL LT 7.0%: CPT | Mod: CPTII,,, | Performed by: REGISTERED NURSE

## 2023-07-26 PROCEDURE — 3066F PR DOCUMENTATION OF TREATMENT FOR NEPHROPATHY: ICD-10-PCS | Mod: CPTII,,, | Performed by: REGISTERED NURSE

## 2023-07-26 PROCEDURE — 3074F PR MOST RECENT SYSTOLIC BLOOD PRESSURE < 130 MM HG: ICD-10-PCS | Mod: CPTII,,, | Performed by: REGISTERED NURSE

## 2023-07-26 PROCEDURE — 1160F RVW MEDS BY RX/DR IN RCRD: CPT | Mod: CPTII,,, | Performed by: REGISTERED NURSE

## 2023-07-26 PROCEDURE — 1159F MED LIST DOCD IN RCRD: CPT | Mod: CPTII,,, | Performed by: REGISTERED NURSE

## 2023-07-26 PROCEDURE — 99215 OFFICE O/P EST HI 40 MIN: CPT | Mod: PBBFAC | Performed by: REGISTERED NURSE

## 2023-07-26 PROCEDURE — 99999 PR PBB SHADOW E&M-EST. PATIENT-LVL V: CPT | Mod: PBBFAC,,, | Performed by: REGISTERED NURSE

## 2023-07-26 PROCEDURE — 3008F PR BODY MASS INDEX (BMI) DOCUMENTED: ICD-10-PCS | Mod: CPTII,,, | Performed by: REGISTERED NURSE

## 2023-07-26 PROCEDURE — 99215 OFFICE O/P EST HI 40 MIN: CPT | Mod: S$PBB,,, | Performed by: REGISTERED NURSE

## 2023-07-26 PROCEDURE — 3044F PR MOST RECENT HEMOGLOBIN A1C LEVEL <7.0%: ICD-10-PCS | Mod: CPTII,,, | Performed by: REGISTERED NURSE

## 2023-07-26 PROCEDURE — 3079F PR MOST RECENT DIASTOLIC BLOOD PRESSURE 80-89 MM HG: ICD-10-PCS | Mod: CPTII,,, | Performed by: REGISTERED NURSE

## 2023-07-26 PROCEDURE — 3061F PR NEG MICROALBUMINURIA RESULT DOCUMENTED/REVIEW: ICD-10-PCS | Mod: CPTII,,, | Performed by: REGISTERED NURSE

## 2023-07-26 PROCEDURE — 3074F SYST BP LT 130 MM HG: CPT | Mod: CPTII,,, | Performed by: REGISTERED NURSE

## 2023-07-26 PROCEDURE — 1159F PR MEDICATION LIST DOCUMENTED IN MEDICAL RECORD: ICD-10-PCS | Mod: CPTII,,, | Performed by: REGISTERED NURSE

## 2023-07-26 PROCEDURE — 3061F NEG MICROALBUMINURIA REV: CPT | Mod: CPTII,,, | Performed by: REGISTERED NURSE

## 2023-07-26 PROCEDURE — 99999 PR PBB SHADOW E&M-EST. PATIENT-LVL V: ICD-10-PCS | Mod: PBBFAC,,, | Performed by: REGISTERED NURSE

## 2023-07-26 PROCEDURE — 3066F NEPHROPATHY DOC TX: CPT | Mod: CPTII,,, | Performed by: REGISTERED NURSE

## 2023-07-26 PROCEDURE — 99215 PR OFFICE/OUTPT VISIT, EST, LEVL V, 40-54 MIN: ICD-10-PCS | Mod: S$PBB,,, | Performed by: REGISTERED NURSE

## 2023-07-26 PROCEDURE — 3079F DIAST BP 80-89 MM HG: CPT | Mod: CPTII,,, | Performed by: REGISTERED NURSE

## 2023-07-26 RX ORDER — CLONAZEPAM 1 MG/1
1 TABLET ORAL 2 TIMES DAILY
COMMUNITY
Start: 2023-06-02 | End: 2024-04-03 | Stop reason: SDUPTHER

## 2023-07-26 RX ORDER — CARIPRAZINE 3 MG/1
3 CAPSULE, GELATIN COATED ORAL DAILY
COMMUNITY
Start: 2023-06-13 | End: 2023-12-08 | Stop reason: HOSPADM

## 2023-07-26 RX ORDER — NYSTATIN 100000 [USP'U]/G
POWDER TOPICAL 2 TIMES DAILY
Qty: 60 G | Refills: 0 | Status: SHIPPED | OUTPATIENT
Start: 2023-07-26 | End: 2023-12-08 | Stop reason: HOSPADM

## 2023-07-26 RX ORDER — ONDANSETRON 8 MG/1
8 TABLET, ORALLY DISINTEGRATING ORAL 2 TIMES DAILY
Qty: 20 TABLET | Refills: 1 | Status: SHIPPED | OUTPATIENT
Start: 2023-07-26 | End: 2023-08-15

## 2023-07-26 NOTE — PROGRESS NOTES
Subjective     Patient ID: Raquel Byrne is a 53 y.o. female.    Chief Complaint: Follow-up and Gynecologic Exam    Patient is a 53-year-old established female here today for follow-up and Pap exam.  Repeat A1c was 6.6 today , patient stated that she does not take metformin but has changed her diet and exercises, liver enzymes continue to be elevated . Patient continues to complain of mid abdominal pain with nausea and vomiting intermittently, patient was referred to GI, stated she has not heard back to schedule an appointment.     Follow-up  Associated symptoms include abdominal pain, nausea and a rash. Pertinent negatives include no chills, fatigue or fever.   Gynecologic Exam  Associated symptoms include abdominal pain, nausea and rash. Pertinent negatives include no chills, constipation, diarrhea or fever. Review of Systems   Constitutional:  Negative for chills, fatigue and fever.   Gastrointestinal:  Positive for abdominal pain and nausea. Negative for abdominal distention, constipation and diarrhea.   Integumentary:  Positive for rash.   All other systems reviewed and are negative.       Objective     Physical Exam  Vitals and nursing note reviewed.   Constitutional:       Appearance: Normal appearance. She is obese.   HENT:      Head: Normocephalic.      Nose: Nose normal.      Mouth/Throat:      Mouth: Mucous membranes are moist.   Eyes:      Extraocular Movements: Extraocular movements intact.      Conjunctiva/sclera: Conjunctivae normal.      Pupils: Pupils are equal, round, and reactive to light.   Cardiovascular:      Rate and Rhythm: Normal rate and regular rhythm.      Pulses: Normal pulses.      Heart sounds: Normal heart sounds.   Pulmonary:      Effort: Pulmonary effort is normal.      Breath sounds: Normal breath sounds.   Chest:      Chest wall: Tenderness present.   Breasts:     Right: Skin change and tenderness present.      Left: Skin change and tenderness present.      Comments: Bilateral  erythremic rash noted to bilateral breast folds  Abdominal:      General: There is no distension.      Palpations: There is no mass.      Tenderness: There is abdominal tenderness in the epigastric area. There is no guarding or rebound.   Genitourinary:     Vagina: Normal.      Cervix: Normal.      Uterus: Normal.       Adnexa: Right adnexa normal.      Rectum: Normal.      Comments: Pap collected, will call patient with results  Musculoskeletal:         General: Normal range of motion.   Skin:     General: Skin is warm.      Capillary Refill: Capillary refill takes less than 2 seconds.      Findings: Rash present.   Neurological:      General: No focal deficit present.      Mental Status: She is alert and oriented to person, place, and time.   Psychiatric:         Mood and Affect: Mood normal.         Behavior: Behavior normal.         Thought Content: Thought content normal.         Judgment: Judgment normal.          Assessment and Plan     1. Cervical cancer screening  - Liquid-Based Pap Smear, Screening Screening    2. Colon cancer screening  - Cologuard Screening (Multitarget Stool DNA); Future; Expected date: 07/26/2023    3. Elevated LFTs  -US Abdomen Complete; Future; Expected date: 07/26/2023    4. Abdominal pain, unspecified abdominal location  - US Abdomen Complete; Future; Expected date: 07/26/2023    5. Nausea and vomiting, unspecified vomiting type  - ondansetron (ZOFRAN-ODT) 8 MG TbDL; Take 1 tablet (8 mg total) by mouth 2 (two) times daily. for 20 days  Dispense: 20 tablet; Refill: 1    6. Candidiasis of breast  -nystatin (MYCOSTATIN) powder; Apply topically 2 (two) times daily. for 14 days  Dispense: 60 g; Refill: 0      Return to clinic in 6 months for follow-up  GI referral sent today  Will call patient with ultrasound result

## 2023-07-27 ENCOUNTER — HOSPITAL ENCOUNTER (OUTPATIENT)
Dept: RADIOLOGY | Facility: HOSPITAL | Age: 53
Discharge: HOME | End: 2023-07-27
Attending: REGISTERED NURSE
Payer: MEDICAID

## 2023-07-27 DIAGNOSIS — Z12.39 ENCOUNTER FOR SCREENING FOR MALIGNANT NEOPLASM OF BREAST, UNSPECIFIED SCREENING MODALITY: ICD-10-CM

## 2023-07-27 PROCEDURE — 87511 GARDNER VAG DNA AMP PROBE: CPT

## 2023-07-27 PROCEDURE — 77067 MAMMO DIGITAL SCREENING BILAT WITH TOMO: ICD-10-PCS | Mod: 26,,, | Performed by: STUDENT IN AN ORGANIZED HEALTH CARE EDUCATION/TRAINING PROGRAM

## 2023-07-27 PROCEDURE — 87529 HSV DNA AMP PROBE: CPT

## 2023-07-27 PROCEDURE — 77067 SCR MAMMO BI INCL CAD: CPT | Mod: TC

## 2023-07-27 PROCEDURE — 77067 SCR MAMMO BI INCL CAD: CPT | Mod: 26,,, | Performed by: STUDENT IN AN ORGANIZED HEALTH CARE EDUCATION/TRAINING PROGRAM

## 2023-07-27 PROCEDURE — 87481 CANDIDA DNA AMP PROBE: CPT | Performed by: REGISTERED NURSE

## 2023-07-27 PROCEDURE — 87661 TRICHOMONAS VAGINALIS AMPLIF: CPT

## 2023-07-27 PROCEDURE — 87624 HPV HI-RISK TYP POOLED RSLT: CPT

## 2023-07-27 PROCEDURE — 77063 MAMMO DIGITAL SCREENING BILAT WITH TOMO: ICD-10-PCS | Mod: 26,,, | Performed by: STUDENT IN AN ORGANIZED HEALTH CARE EDUCATION/TRAINING PROGRAM

## 2023-07-27 PROCEDURE — 87491 CHLMYD TRACH DNA AMP PROBE: CPT

## 2023-07-27 PROCEDURE — 77063 BREAST TOMOSYNTHESIS BI: CPT | Mod: 26,,, | Performed by: STUDENT IN AN ORGANIZED HEALTH CARE EDUCATION/TRAINING PROGRAM

## 2023-07-27 PROCEDURE — 87591 N.GONORRHOEAE DNA AMP PROB: CPT

## 2023-08-01 LAB — PSYCHE PATHOLOGY RESULT: NORMAL

## 2023-08-07 ENCOUNTER — TELEPHONE (OUTPATIENT)
Dept: FAMILY MEDICINE | Facility: CLINIC | Age: 53
End: 2023-08-07
Payer: MEDICAID

## 2023-08-07 DIAGNOSIS — K59.00 CONSTIPATION, UNSPECIFIED CONSTIPATION TYPE: ICD-10-CM

## 2023-08-07 NOTE — TELEPHONE ENCOUNTER
----- Message from CLEMENTE Hand sent at 8/7/2023 10:05 AM CDT -----  Your mammogram is normal, which means no signs of cancer can be seen.  You are recommended to repeat this test every 1-2 years until at least age 75.

## 2023-08-07 NOTE — PROGRESS NOTES
Your mammogram is normal, which means no signs of cancer can be seen.  You are recommended to repeat this test every 1-2 years until at least age 75.

## 2023-08-07 NOTE — TELEPHONE ENCOUNTER
----- Message from CLEMENTE Hand sent at 8/7/2023 10:17 AM CDT -----  Please inform patient of abnormal Pap results, recommendations are to repeat this test in 3 yrs, please notify office with any changes/concerns

## 2023-08-07 NOTE — TELEPHONE ENCOUNTER
----- Message from CLEMENTE Hand sent at 8/7/2023 10:05 AM CDT -----  Please inform patient of Pap results, (abnormal HPV+) I will refer to GYN for colpo, thanks.

## 2023-08-09 ENCOUNTER — HOSPITAL ENCOUNTER (OUTPATIENT)
Dept: RADIOLOGY | Facility: HOSPITAL | Age: 53
Discharge: HOME | End: 2023-08-09
Attending: REGISTERED NURSE
Payer: MEDICAID

## 2023-08-09 DIAGNOSIS — R10.9 ABDOMINAL PAIN, UNSPECIFIED ABDOMINAL LOCATION: ICD-10-CM

## 2023-08-09 DIAGNOSIS — R79.89 ELEVATED LFTS: ICD-10-CM

## 2023-08-09 PROCEDURE — 76700 US EXAM ABDOM COMPLETE: CPT | Mod: TC

## 2023-08-09 NOTE — PROGRESS NOTES
Please call patient and inform about US results (gallstones), GI referral was sent in April, please inquire if she received an appt, thanks.

## 2023-08-10 ENCOUNTER — TELEPHONE (OUTPATIENT)
Dept: FAMILY MEDICINE | Facility: CLINIC | Age: 53
End: 2023-08-10
Payer: MEDICAID

## 2023-08-10 NOTE — TELEPHONE ENCOUNTER
----- Message from CLEMENTE Hand sent at 8/9/2023 12:37 PM CDT -----  Please call patient and inform about US results (gallstones), GI referral was sent in April, please inquire if she received an appt, thanks.

## 2023-08-11 DIAGNOSIS — I10 HYPERTENSION, UNSPECIFIED TYPE: ICD-10-CM

## 2023-08-11 RX ORDER — METOPROLOL SUCCINATE 25 MG/1
25 TABLET, EXTENDED RELEASE ORAL
Qty: 30 TABLET | Refills: 0 | Status: SHIPPED | OUTPATIENT
Start: 2023-08-11 | End: 2023-09-06

## 2023-08-29 ENCOUNTER — PATIENT MESSAGE (OUTPATIENT)
Dept: ADMINISTRATIVE | Facility: HOSPITAL | Age: 53
End: 2023-08-29
Payer: MEDICAID

## 2023-09-06 DIAGNOSIS — I10 HYPERTENSION, UNSPECIFIED TYPE: ICD-10-CM

## 2023-09-06 RX ORDER — METOPROLOL SUCCINATE 25 MG/1
25 TABLET, EXTENDED RELEASE ORAL
Qty: 30 TABLET | Refills: 0 | Status: SHIPPED | OUTPATIENT
Start: 2023-09-06 | End: 2023-10-17 | Stop reason: SDUPTHER

## 2023-09-27 ENCOUNTER — PATIENT OUTREACH (OUTPATIENT)
Dept: ADMINISTRATIVE | Facility: HOSPITAL | Age: 53
End: 2023-09-27
Payer: MEDICAID

## 2023-09-27 LAB — NONINV COLON CA DNA+OCC BLD SCRN STL QL: NEGATIVE

## 2023-10-17 DIAGNOSIS — I10 HYPERTENSION, UNSPECIFIED TYPE: ICD-10-CM

## 2023-10-17 RX ORDER — METOPROLOL SUCCINATE 25 MG/1
25 TABLET, EXTENDED RELEASE ORAL DAILY
Qty: 30 TABLET | Refills: 3 | Status: SHIPPED | OUTPATIENT
Start: 2023-10-17 | End: 2024-03-15

## 2023-11-08 ENCOUNTER — HOSPITAL ENCOUNTER (OUTPATIENT)
Dept: RADIOLOGY | Facility: HOSPITAL | Age: 53
Discharge: HOME | End: 2023-11-08
Attending: SURGERY
Payer: MEDICAID

## 2023-11-08 VITALS — BODY MASS INDEX: 38.25 KG/M2 | WEIGHT: 237 LBS

## 2023-11-08 DIAGNOSIS — R10.13 ABDOMINAL PAIN, EPIGASTRIC: ICD-10-CM

## 2023-11-08 PROCEDURE — 63600175 PHARM REV CODE 636 W HCPCS: Performed by: SURGERY

## 2023-11-08 PROCEDURE — A9537 TC99M MEBROFENIN: HCPCS

## 2023-11-08 RX ORDER — SINCALIDE 5 UG/5ML
0.02 INJECTION, POWDER, LYOPHILIZED, FOR SOLUTION INTRAVENOUS ONCE
Status: COMPLETED | OUTPATIENT
Start: 2023-11-08 | End: 2023-11-08

## 2023-11-08 RX ADMIN — SINCALIDE 2.2 MCG: 5 INJECTION, POWDER, LYOPHILIZED, FOR SOLUTION INTRAVENOUS at 10:11

## 2023-11-30 ENCOUNTER — HOSPITAL ENCOUNTER (OUTPATIENT)
Dept: PREADMISSION TESTING | Facility: HOSPITAL | Age: 53
Discharge: HOME | End: 2023-11-30
Attending: SURGERY
Payer: MEDICAID

## 2023-11-30 ENCOUNTER — HOSPITAL ENCOUNTER (OUTPATIENT)
Dept: RADIOLOGY | Facility: HOSPITAL | Age: 53
Discharge: HOME | End: 2023-11-30
Attending: SURGERY
Payer: MEDICAID

## 2023-11-30 VITALS — BODY MASS INDEX: 37.55 KG/M2 | HEIGHT: 66 IN | WEIGHT: 233.63 LBS

## 2023-11-30 DIAGNOSIS — Z01.818 PREOP TESTING: ICD-10-CM

## 2023-11-30 DIAGNOSIS — Z12.11 SCREENING FOR COLON CANCER: Primary | ICD-10-CM

## 2023-11-30 DIAGNOSIS — Z12.11 COLON CANCER SCREENING: ICD-10-CM

## 2023-11-30 DIAGNOSIS — K21.9 GASTROESOPHAGEAL REFLUX DISEASE, UNSPECIFIED WHETHER ESOPHAGITIS PRESENT: Primary | ICD-10-CM

## 2023-11-30 PROCEDURE — 71046 X-RAY EXAM CHEST 2 VIEWS: CPT | Mod: TC

## 2023-11-30 PROCEDURE — 93010 EKG 12-LEAD: ICD-10-PCS | Mod: ,,, | Performed by: INTERNAL MEDICINE

## 2023-11-30 PROCEDURE — 93005 ELECTROCARDIOGRAM TRACING: CPT

## 2023-11-30 PROCEDURE — 93010 ELECTROCARDIOGRAM REPORT: CPT | Mod: ,,, | Performed by: INTERNAL MEDICINE

## 2023-11-30 RX ORDER — LUMATEPERONE 21 MG/1
1 CAPSULE ORAL NIGHTLY
COMMUNITY
Start: 2023-11-21

## 2023-11-30 RX ORDER — GLYCOPYRROLATE 0.2 MG/ML
0.2 INJECTION INTRAMUSCULAR; INTRAVENOUS
Status: CANCELLED | OUTPATIENT
Start: 2023-12-06

## 2023-11-30 RX ORDER — FLUOXETINE 10 MG/1
10 CAPSULE ORAL EVERY MORNING
COMMUNITY
Start: 2023-11-28 | End: 2024-03-28 | Stop reason: SDUPTHER

## 2023-11-30 RX ORDER — SODIUM CHLORIDE, SODIUM LACTATE, POTASSIUM CHLORIDE, CALCIUM CHLORIDE 600; 310; 30; 20 MG/100ML; MG/100ML; MG/100ML; MG/100ML
INJECTION, SOLUTION INTRAVENOUS CONTINUOUS
Status: CANCELLED | OUTPATIENT
Start: 2023-12-08

## 2023-11-30 RX ORDER — ONDANSETRON 8 MG/1
8 TABLET, ORALLY DISINTEGRATING ORAL EVERY 6 HOURS PRN
COMMUNITY
Start: 2023-11-28 | End: 2024-03-28 | Stop reason: SDUPTHER

## 2023-11-30 RX ORDER — SODIUM CHLORIDE, SODIUM LACTATE, POTASSIUM CHLORIDE, CALCIUM CHLORIDE 600; 310; 30; 20 MG/100ML; MG/100ML; MG/100ML; MG/100ML
INJECTION, SOLUTION INTRAVENOUS CONTINUOUS
Status: CANCELLED | OUTPATIENT
Start: 2023-12-06

## 2023-11-30 NOTE — DISCHARGE INSTRUCTIONS

## 2023-11-30 NOTE — DISCHARGE INSTRUCTIONS

## 2023-12-05 ENCOUNTER — ANESTHESIA EVENT (OUTPATIENT)
Dept: GASTROENTEROLOGY | Facility: HOSPITAL | Age: 53
End: 2023-12-05
Payer: MEDICAID

## 2023-12-05 NOTE — ANESTHESIA PREPROCEDURE EVALUATION
12/05/2023  Raquel Byrne is a 53 y.o., female.      Pre-op Assessment    I have reviewed the Patient Summary Reports.     I have reviewed the Nursing Notes. I have reviewed the NPO Status.   I have reviewed the Medications.     Review of Systems  Anesthesia Hx:  No problems with previous Anesthesia             Denies Family Hx of Anesthesia complications.    Denies Personal Hx of Anesthesia complications.                    Hematology/Oncology:  Hematology Normal   Oncology Normal                                   EENT/Dental:  EENT/Dental Normal           Cardiovascular:  Exercise tolerance: good   Hypertension                                        Pulmonary:  Pulmonary Normal                       Renal/:  Renal/ Normal                 Hepatic/GI:  Hepatic/GI Normal                 Musculoskeletal:  Musculoskeletal Normal                Neurological:  Neurology Normal                                      Endocrine:  Diabetes, using insulin         Obesity / BMI > 30  Dermatological:  Skin Normal    Psych:  Psychiatric History anxiety                 Physical Exam  General: Well nourished, Cooperative, Alert and Oriented    Airway:  Mallampati: II / II  Mouth Opening: Normal  TM Distance: Normal  Tongue: Normal  Neck ROM: Normal ROM    Dental:  Intact        Anesthesia Plan  Type of Anesthesia, risks & benefits discussed:    Anesthesia Type: Gen ETT  Intra-op Monitoring Plan: Standard ASA Monitors  Post Op Pain Control Plan: multimodal analgesia  Induction:  IV  Airway Plan: Direct  Informed Consent: Informed consent signed with the Patient and all parties understand the risks and agree with anesthesia plan.  All questions answered. Patient consented to blood products? Yes  ASA Score: 3  Day of Surgery Review of History & Physical: H&P Update referred to the surgeon/provider.I have interviewed and  examined the patient. I have reviewed the patient's H&P dated: There are no significant changes.     Ready For Surgery From Anesthesia Perspective.     .

## 2023-12-06 ENCOUNTER — HOSPITAL ENCOUNTER (OUTPATIENT)
Dept: GASTROENTEROLOGY | Facility: HOSPITAL | Age: 53
Discharge: HOME | End: 2023-12-06
Attending: SURGERY
Payer: MEDICAID

## 2023-12-06 ENCOUNTER — ANESTHESIA (OUTPATIENT)
Dept: GASTROENTEROLOGY | Facility: HOSPITAL | Age: 53
End: 2023-12-06
Payer: MEDICAID

## 2023-12-06 VITALS
HEIGHT: 66 IN | DIASTOLIC BLOOD PRESSURE: 74 MMHG | WEIGHT: 233 LBS | HEART RATE: 62 BPM | TEMPERATURE: 97 F | RESPIRATION RATE: 20 BRPM | OXYGEN SATURATION: 95 % | BODY MASS INDEX: 37.45 KG/M2 | SYSTOLIC BLOOD PRESSURE: 133 MMHG

## 2023-12-06 DIAGNOSIS — Z01.818 PREOP TESTING: ICD-10-CM

## 2023-12-06 DIAGNOSIS — K21.9 GASTROESOPHAGEAL REFLUX DISEASE, UNSPECIFIED WHETHER ESOPHAGITIS PRESENT: Primary | ICD-10-CM

## 2023-12-06 DIAGNOSIS — R10.13 MIDEPIGASTRIC PAIN: ICD-10-CM

## 2023-12-06 LAB — B-HCG SERPL QL: NEGATIVE

## 2023-12-06 PROCEDURE — D9220A PRA ANESTHESIA: ICD-10-PCS | Mod: ,,, | Performed by: NURSE ANESTHETIST, CERTIFIED REGISTERED

## 2023-12-06 PROCEDURE — D9220A PRA ANESTHESIA: Mod: ,,, | Performed by: NURSE ANESTHETIST, CERTIFIED REGISTERED

## 2023-12-06 PROCEDURE — 63600175 PHARM REV CODE 636 W HCPCS: Performed by: NURSE ANESTHETIST, CERTIFIED REGISTERED

## 2023-12-06 PROCEDURE — 43239 EGD BIOPSY SINGLE/MULTIPLE: CPT | Performed by: SURGERY

## 2023-12-06 PROCEDURE — 63600175 PHARM REV CODE 636 W HCPCS: Performed by: SURGERY

## 2023-12-06 PROCEDURE — 25000003 PHARM REV CODE 250: Performed by: NURSE ANESTHETIST, CERTIFIED REGISTERED

## 2023-12-06 PROCEDURE — 27201423 OPTIME MED/SURG SUP & DEVICES STERILE SUPPLY

## 2023-12-06 PROCEDURE — 37000008 HC ANESTHESIA 1ST 15 MINUTES

## 2023-12-06 PROCEDURE — 81025 URINE PREGNANCY TEST: CPT | Performed by: SURGERY

## 2023-12-06 PROCEDURE — 25000003 PHARM REV CODE 250: Performed by: SURGERY

## 2023-12-06 RX ORDER — SODIUM CHLORIDE, SODIUM LACTATE, POTASSIUM CHLORIDE, CALCIUM CHLORIDE 600; 310; 30; 20 MG/100ML; MG/100ML; MG/100ML; MG/100ML
INJECTION, SOLUTION INTRAVENOUS CONTINUOUS
Status: DISCONTINUED | OUTPATIENT
Start: 2023-12-06 | End: 2023-12-07 | Stop reason: HOSPADM

## 2023-12-06 RX ORDER — LIDOCAINE HYDROCHLORIDE 20 MG/ML
INJECTION INTRAVENOUS
Status: DISCONTINUED | OUTPATIENT
Start: 2023-12-06 | End: 2023-12-06

## 2023-12-06 RX ORDER — SODIUM CHLORIDE 9 MG/ML
INJECTION, SOLUTION INTRAVENOUS CONTINUOUS
Status: DISCONTINUED | OUTPATIENT
Start: 2023-12-06 | End: 2023-12-07 | Stop reason: HOSPADM

## 2023-12-06 RX ORDER — PROPOFOL 10 MG/ML
VIAL (ML) INTRAVENOUS
Status: DISCONTINUED | OUTPATIENT
Start: 2023-12-06 | End: 2023-12-06

## 2023-12-06 RX ORDER — GLYCOPYRROLATE 0.2 MG/ML
0.2 INJECTION INTRAMUSCULAR; INTRAVENOUS
Status: DISCONTINUED | OUTPATIENT
Start: 2023-12-06 | End: 2023-12-07 | Stop reason: HOSPADM

## 2023-12-06 RX ADMIN — LIDOCAINE HYDROCHLORIDE 100 MG: 20 INJECTION, SOLUTION INTRAVENOUS at 12:12

## 2023-12-06 RX ADMIN — PROPOFOL 80 MG: 10 INJECTION, EMULSION INTRAVENOUS at 12:12

## 2023-12-06 RX ADMIN — SODIUM CHLORIDE, POTASSIUM CHLORIDE, SODIUM LACTATE AND CALCIUM CHLORIDE: 600; 310; 30; 20 INJECTION, SOLUTION INTRAVENOUS at 11:12

## 2023-12-06 RX ADMIN — PROPOFOL 50 MG: 10 INJECTION, EMULSION INTRAVENOUS at 12:12

## 2023-12-06 RX ADMIN — GLYCOPYRROLATE 0.2 MG: 0.2 INJECTION INTRAMUSCULAR; INTRAVENOUS at 11:12

## 2023-12-06 NOTE — PLAN OF CARE
Patient has abdominal pain, Rates pain a 5/10 but states that pain in bearable, Patient is drinking a Sprite, No s/s of distress noted, Family member at bedside, Close to nurse's station, Stable condition

## 2023-12-06 NOTE — PLAN OF CARE
Patient states that her pain level has decreased to a 3/10, Instructed patient to notify her physician for increased abdominal pain or go to the nearest ER, Patient verbalized understanding,

## 2023-12-06 NOTE — ANESTHESIA POSTPROCEDURE EVALUATION
Anesthesia Post Evaluation    Patient: Raquel Byrne    Procedure(s) Performed: * No procedures listed *    Final Anesthesia Type: MAC      Patient location during evaluation: floor  Patient participation: Yes- Able to Participate  Level of consciousness: awake and alert, awake and oriented  Post-procedure vital signs: reviewed and stable  Pain management: adequate  Airway patency: patent    PONV status at discharge: No PONV  Anesthetic complications: no      Cardiovascular status: blood pressure returned to baseline  Respiratory status: unassisted, room air and spontaneous ventilation  Hydration status: euvolemic  Follow-up not needed.              Vitals Value Taken Time   /86 12/06/23 1122   Temp 36.3 °C (97.3 °F) 12/06/23 1122   Pulse 68 12/06/23 1122   Resp 20 12/06/23 1122   SpO2 97 % 12/06/23 1122         No case tracking events are documented in the log.      Pain/Lori Score: No data recorded

## 2023-12-06 NOTE — PLAN OF CARE
Patient is resting quietly, No s/s of distress noted, Denies pain, Drinking water and tolerating it well,

## 2023-12-08 ENCOUNTER — ANESTHESIA EVENT (OUTPATIENT)
Dept: GASTROENTEROLOGY | Facility: HOSPITAL | Age: 53
End: 2023-12-08
Payer: MEDICAID

## 2023-12-08 ENCOUNTER — HOSPITAL ENCOUNTER (OUTPATIENT)
Dept: GASTROENTEROLOGY | Facility: HOSPITAL | Age: 53
Discharge: HOME | End: 2023-12-08
Attending: SURGERY
Payer: MEDICAID

## 2023-12-08 ENCOUNTER — ANESTHESIA (OUTPATIENT)
Dept: GASTROENTEROLOGY | Facility: HOSPITAL | Age: 53
End: 2023-12-08
Payer: MEDICAID

## 2023-12-08 VITALS
TEMPERATURE: 97 F | DIASTOLIC BLOOD PRESSURE: 79 MMHG | HEART RATE: 74 BPM | BODY MASS INDEX: 37.45 KG/M2 | SYSTOLIC BLOOD PRESSURE: 145 MMHG | RESPIRATION RATE: 14 BRPM | OXYGEN SATURATION: 97 % | WEIGHT: 233 LBS | HEIGHT: 66 IN

## 2023-12-08 DIAGNOSIS — Z12.11 SCREEN FOR COLON CANCER: ICD-10-CM

## 2023-12-08 DIAGNOSIS — Z12.11 SCREENING FOR COLON CANCER: ICD-10-CM

## 2023-12-08 DIAGNOSIS — Z12.11 COLON CANCER SCREENING: Primary | ICD-10-CM

## 2023-12-08 LAB — B-HCG SERPL QL: NEGATIVE

## 2023-12-08 PROCEDURE — 37000009 HC ANESTHESIA EA ADD 15 MINS

## 2023-12-08 PROCEDURE — 63600175 PHARM REV CODE 636 W HCPCS: Performed by: SURGERY

## 2023-12-08 PROCEDURE — 81025 URINE PREGNANCY TEST: CPT | Performed by: SURGERY

## 2023-12-08 PROCEDURE — D9220A PRA ANESTHESIA: Mod: ,,, | Performed by: NURSE ANESTHETIST, CERTIFIED REGISTERED

## 2023-12-08 PROCEDURE — 45378 DIAGNOSTIC COLONOSCOPY: CPT | Performed by: SURGERY

## 2023-12-08 PROCEDURE — D9220A PRA ANESTHESIA: ICD-10-PCS | Mod: ,,, | Performed by: NURSE ANESTHETIST, CERTIFIED REGISTERED

## 2023-12-08 PROCEDURE — 25000003 PHARM REV CODE 250: Performed by: NURSE ANESTHETIST, CERTIFIED REGISTERED

## 2023-12-08 PROCEDURE — 63600175 PHARM REV CODE 636 W HCPCS: Performed by: NURSE ANESTHETIST, CERTIFIED REGISTERED

## 2023-12-08 PROCEDURE — 37000008 HC ANESTHESIA 1ST 15 MINUTES

## 2023-12-08 RX ORDER — LIDOCAINE HYDROCHLORIDE 20 MG/ML
INJECTION INTRAVENOUS
Status: DISCONTINUED | OUTPATIENT
Start: 2023-12-08 | End: 2023-12-08

## 2023-12-08 RX ORDER — GLYCOPYRROLATE 0.2 MG/ML
INJECTION INTRAMUSCULAR; INTRAVENOUS
Status: DISCONTINUED | OUTPATIENT
Start: 2023-12-08 | End: 2023-12-08

## 2023-12-08 RX ORDER — PROPOFOL 10 MG/ML
VIAL (ML) INTRAVENOUS
Status: DISCONTINUED | OUTPATIENT
Start: 2023-12-08 | End: 2023-12-08

## 2023-12-08 RX ORDER — SODIUM CHLORIDE 9 MG/ML
INJECTION, SOLUTION INTRAVENOUS CONTINUOUS
Status: CANCELLED | OUTPATIENT
Start: 2023-12-08

## 2023-12-08 RX ORDER — SODIUM CHLORIDE, SODIUM LACTATE, POTASSIUM CHLORIDE, CALCIUM CHLORIDE 600; 310; 30; 20 MG/100ML; MG/100ML; MG/100ML; MG/100ML
INJECTION, SOLUTION INTRAVENOUS CONTINUOUS
Status: DISCONTINUED | OUTPATIENT
Start: 2023-12-08 | End: 2023-12-10 | Stop reason: HOSPADM

## 2023-12-08 RX ADMIN — PROPOFOL 30 MG: 10 INJECTION, EMULSION INTRAVENOUS at 03:12

## 2023-12-08 RX ADMIN — PROPOFOL 80 MG: 10 INJECTION, EMULSION INTRAVENOUS at 03:12

## 2023-12-08 RX ADMIN — SODIUM CHLORIDE, POTASSIUM CHLORIDE, SODIUM LACTATE AND CALCIUM CHLORIDE: 600; 310; 30; 20 INJECTION, SOLUTION INTRAVENOUS at 01:12

## 2023-12-08 RX ADMIN — LIDOCAINE HYDROCHLORIDE 50 MG: 20 INJECTION, SOLUTION INTRAVENOUS at 02:12

## 2023-12-08 RX ADMIN — GLYCOPYRROLATE 0.2 MG: 0.2 INJECTION INTRAMUSCULAR; INTRAVENOUS at 03:12

## 2023-12-08 NOTE — ANESTHESIA POSTPROCEDURE EVALUATION
Anesthesia Post Evaluation    Patient: Raquel Byrne    Procedure(s) Performed: * No procedures listed *    Final Anesthesia Type: MAC      Patient location during evaluation: GI PACU  Patient participation: Yes- Able to Participate  Level of consciousness: awake and alert, awake and oriented  Post-procedure vital signs: reviewed and stable  Pain management: adequate  Airway patency: patent    PONV status at discharge: No PONV  Anesthetic complications: no      Cardiovascular status: blood pressure returned to baseline  Respiratory status: unassisted, room air and spontaneous ventilation  Hydration status: euvolemic  Follow-up not needed.              Vitals Value Taken Time   /86 12/08/23 1252   Temp 36.1 °C (97 °F) 12/08/23 1252   Pulse 85 12/08/23 1252   Resp 20 12/08/23 1252   SpO2 95 % 12/08/23 1252         No case tracking events are documented in the log.      Pain/Lori Score: No data recorded

## 2023-12-08 NOTE — DISCHARGE INSTRUCTIONS
Follow-up with Dr Hernandes  Diet: as tolerated  Activity:  decrease activity today, no driving today, resume all activity tomorrow.  Notify MD:  increased swelling of abdomen, excessive nausea/vomiting, excessive bright red bleeding from rectum.  Medications:  continue your home medications. Keep a list of your home medications at all times for emergencies.

## 2023-12-08 NOTE — ANESTHESIA PREPROCEDURE EVALUATION
12/08/2023  Raquel Byrne is a 53 y.o., female.      Pre-op Assessment    I have reviewed the Patient Summary Reports.     I have reviewed the Nursing Notes. I have reviewed the NPO Status.   I have reviewed the Medications.     Review of Systems  Anesthesia Hx:  No problems with previous Anesthesia             Denies Family Hx of Anesthesia complications.    Denies Personal Hx of Anesthesia complications.                    Social:  Smoker       Hematology/Oncology:  Hematology Normal   Oncology Normal                                   EENT/Dental:  EENT/Dental Normal           Cardiovascular:  Exercise tolerance: poor   Hypertension                                        Pulmonary:  Pulmonary Normal                       Renal/:  Renal/ Normal                 Hepatic/GI:      Liver Disease,  Elevated lft          Musculoskeletal:  Musculoskeletal Normal                Neurological:  Neurology Normal                                      Endocrine:  Diabetes, well controlled, type 2         Obesity / BMI > 30  Dermatological:  Skin Normal    Psych:  Psychiatric Normal                    Physical Exam  General: Well nourished, Cooperative, Alert and Oriented    Airway:  Mallampati: II / II  Mouth Opening: Normal  TM Distance: Normal  Tongue: Normal  Neck ROM: Normal ROM    Dental:  Intact        Anesthesia Plan  Type of Anesthesia, risks & benefits discussed:    Anesthesia Type: MAC  Intra-op Monitoring Plan: Standard ASA Monitors  Post Op Pain Control Plan: multimodal analgesia  Induction:  IV  Informed Consent: Informed consent signed with the Patient and all parties understand the risks and agree with anesthesia plan.  All questions answered. Patient consented to blood products? Yes  ASA Score: 3  Day of Surgery Review of History & Physical: H&P Update referred to the surgeon/provider.I have  interviewed and examined the patient. I have reviewed the patient's H&P dated: There are no significant changes.     Ready For Surgery From Anesthesia Perspective.     .

## 2023-12-15 ENCOUNTER — CLINICAL SUPPORT (OUTPATIENT)
Dept: FAMILY MEDICINE | Facility: CLINIC | Age: 53
End: 2023-12-15
Attending: NURSE PRACTITIONER
Payer: MEDICAID

## 2023-12-15 DIAGNOSIS — E11.9 TYPE 2 DIABETES MELLITUS WITHOUT COMPLICATION, WITHOUT LONG-TERM CURRENT USE OF INSULIN: ICD-10-CM

## 2023-12-15 PROCEDURE — 92228 IMG RTA DETC/MNTR DS PHY/QHP: CPT | Mod: PBBFAC

## 2023-12-18 ENCOUNTER — TELEPHONE (OUTPATIENT)
Dept: FAMILY MEDICINE | Facility: CLINIC | Age: 53
End: 2023-12-18
Payer: MEDICAID

## 2023-12-18 LAB
LEFT EYE DM RETINOPATHY: NEGATIVE
RIGHT EYE DM RETINOPATHY: NEGATIVE

## 2023-12-18 NOTE — TELEPHONE ENCOUNTER
----- Message from LAUREL Membreno sent at 12/18/2023  9:43 AM CST -----  Hello,     Can you call the patient and let them know that there Diabetic Eye Screening was negative for DM Retinopathy. I also sent message to the patient portal.     Sincerely,

## 2023-12-18 NOTE — PROGRESS NOTES
Hello,     Can you call the patient and let them know that there Diabetic Eye Screening was negative for DM Retinopathy. I also sent message to the patient portal.     Sincerely,

## 2023-12-18 NOTE — DISCHARGE SUMMARY
[facility]  Discharge Note  Short Stay    EGD      OUTCOME: Patient tolerated treatment/procedure well without complication and is now ready for discharge.    DISPOSITION: Home or Self Care    FINAL DIAGNOSIS:  Pain of upper abdomen    FOLLOWUP: In clinic    DISCHARGE INSTRUCTIONS:    Discharge Procedure Orders   Diet general         Clinical Reference Documents Added to Patient Instructions         Document    UPPER GI ENDOSCOPY DISCHARGE INSTRUCTIONS (ENGLISH)            TIME SPENT ON DISCHARGE: 5 minutes

## 2023-12-18 NOTE — TELEPHONE ENCOUNTER
Informed pt of results. Pt v/u.    [Weight management counseling provided] : Weight management [Activity counseling provided] : activity [Healthy eating counseling provided] : healthy eating [Low Fat Diet] : Low fat diet [Weight Self Once Weekly] : Weight self once weekly [Walking] : Walking [Low Salt Diet] : Low salt diet [Good understanding] : Patient has a good understanding of lifestyle changes and the steps needed to achieve self management goals [None] : None

## 2023-12-18 NOTE — PROGRESS NOTES
Raquel Byrne is a 53 y.o. female here for a diabetic eye screening with non-dilated fundus photos per APRIL Tadeo.    Patient cooperative?: Yes  Small pupils?: No  Last eye exam: NA    For exam results, see Encounter Report.

## 2023-12-20 NOTE — DISCHARGE SUMMARY
[facility]  Discharge Note  Short Stay    Colonoscopy      OUTCOME: Patient tolerated treatment/procedure well without complication and is now ready for discharge.    DISPOSITION: Home or Self Care    FINAL DIAGNOSIS:  Colon cancer screening    FOLLOWUP: In clinic    DISCHARGE INSTRUCTIONS:    Discharge Procedure Orders   Diet general         Clinical Reference Documents Added to Patient Instructions         Document    COLONOSCOPY DISCHARGE INSTRUCTIONS (ENGLISH)            TIME SPENT ON DISCHARGE: 5 minutes

## 2023-12-25 ENCOUNTER — PATIENT MESSAGE (OUTPATIENT)
Dept: ADMINISTRATIVE | Facility: OTHER | Age: 53
End: 2023-12-25
Payer: MEDICAID

## 2023-12-26 ENCOUNTER — LAB VISIT (OUTPATIENT)
Dept: LAB | Facility: HOSPITAL | Age: 53
End: 2023-12-26
Attending: SURGERY
Payer: MEDICAID

## 2023-12-26 ENCOUNTER — PATIENT MESSAGE (OUTPATIENT)
Dept: ADMINISTRATIVE | Facility: OTHER | Age: 53
End: 2023-12-26
Payer: MEDICAID

## 2023-12-26 DIAGNOSIS — K81.1 CHRONIC CHOLECYSTITIS: Primary | ICD-10-CM

## 2023-12-26 LAB
ALBUMIN SERPL-MCNC: 3.4 G/DL (ref 3.5–5)
ALBUMIN/GLOB SERPL: 0.7 RATIO (ref 1.1–2)
ALP SERPL-CCNC: 71 UNIT/L (ref 40–150)
ALT SERPL-CCNC: 41 UNIT/L (ref 0–55)
APTT PPP: 38.8 SECONDS (ref 24.6–37.2)
AST SERPL-CCNC: 38 UNIT/L (ref 5–34)
BASOPHILS # BLD AUTO: 0.02 X10(3)/MCL
BASOPHILS NFR BLD AUTO: 0.6 %
BILIRUB SERPL-MCNC: 0.7 MG/DL
BUN SERPL-MCNC: 9 MG/DL (ref 9.8–20.1)
CALCIUM SERPL-MCNC: 9.6 MG/DL (ref 8.4–10.2)
CHLORIDE SERPL-SCNC: 103 MMOL/L (ref 98–107)
CO2 SERPL-SCNC: 25 MMOL/L (ref 22–29)
CREAT SERPL-MCNC: 0.82 MG/DL (ref 0.55–1.02)
EOSINOPHIL # BLD AUTO: 0.28 X10(3)/MCL (ref 0–0.9)
EOSINOPHIL NFR BLD AUTO: 8.4 %
ERYTHROCYTE [DISTWIDTH] IN BLOOD BY AUTOMATED COUNT: 12.3 % (ref 11.5–17)
GFR SERPLBLD CREATININE-BSD FMLA CKD-EPI: >60 MLS/MIN/1.73/M2
GLOBULIN SER-MCNC: 5.1 GM/DL (ref 2.4–3.5)
GLUCOSE SERPL-MCNC: 177 MG/DL (ref 74–100)
HCT VFR BLD AUTO: 42.4 % (ref 37–47)
HGB BLD-MCNC: 14.2 G/DL (ref 12–16)
IMM GRANULOCYTES # BLD AUTO: 0 X10(3)/MCL (ref 0–0.04)
IMM GRANULOCYTES NFR BLD AUTO: 0 %
INR PPP: 1.1
LYMPHOCYTES # BLD AUTO: 0.99 X10(3)/MCL (ref 0.6–4.6)
LYMPHOCYTES NFR BLD AUTO: 29.6 %
MCH RBC QN AUTO: 33.3 PG (ref 27–31)
MCHC RBC AUTO-ENTMCNC: 33.5 G/DL (ref 33–36)
MCV RBC AUTO: 99.3 FL (ref 80–94)
MONOCYTES # BLD AUTO: 0.38 X10(3)/MCL (ref 0.1–1.3)
MONOCYTES NFR BLD AUTO: 11.4 %
NEUTROPHILS # BLD AUTO: 1.67 X10(3)/MCL (ref 2.1–9.2)
NEUTROPHILS NFR BLD AUTO: 50 %
PLATELET # BLD AUTO: 122 X10(3)/MCL (ref 130–400)
PMV BLD AUTO: 10 FL (ref 7.4–10.4)
POTASSIUM SERPL-SCNC: 3.8 MMOL/L (ref 3.5–5.1)
PROT SERPL-MCNC: 8.5 GM/DL (ref 6.4–8.3)
PROTHROMBIN TIME: 14.2 SECONDS (ref 12.5–14.5)
RBC # BLD AUTO: 4.27 X10(6)/MCL (ref 4.2–5.4)
SODIUM SERPL-SCNC: 134 MMOL/L (ref 136–145)
WBC # SPEC AUTO: 3.34 X10(3)/MCL (ref 4.5–11.5)

## 2023-12-26 PROCEDURE — 80053 COMPREHEN METABOLIC PANEL: CPT

## 2023-12-26 PROCEDURE — 85730 THROMBOPLASTIN TIME PARTIAL: CPT

## 2023-12-26 PROCEDURE — 85610 PROTHROMBIN TIME: CPT

## 2023-12-26 PROCEDURE — 85025 COMPLETE CBC W/AUTO DIFF WBC: CPT

## 2023-12-26 PROCEDURE — 36415 COLL VENOUS BLD VENIPUNCTURE: CPT

## 2023-12-26 NOTE — DISCHARGE INSTRUCTIONS
DR. ZAMUDIO POST OP INSTRUCTIONS       STARTING TOMORROW, SHOWER NO BATHS. CLEAN INCISIONS DAILY   WITH SOAP AND WATER. KEEP CLEAN AND DRY. NO HEAVY LIFTING OR DRIVING   UNTIL RELEASED BY DR ZAMUDIO. NOTIFY YOUR DOCTOR WITH ANY FEVER   GREATER THAN 101, REDNESS OR DRAINAGE AT INCISION SITE, EXCESSIVE PAIN,                                  OR  EXCESSIVE BLEEDING .

## 2023-12-27 ENCOUNTER — ANESTHESIA EVENT (OUTPATIENT)
Dept: SURGERY | Facility: HOSPITAL | Age: 53
End: 2023-12-27
Payer: MEDICAID

## 2023-12-27 ENCOUNTER — PATIENT MESSAGE (OUTPATIENT)
Dept: ADMINISTRATIVE | Facility: OTHER | Age: 53
End: 2023-12-27
Payer: MEDICAID

## 2023-12-27 NOTE — ANESTHESIA PREPROCEDURE EVALUATION
12/27/2023  Raquel Byrne is a 53 y.o., female.      Pre-op Assessment    I have reviewed the Patient Summary Reports.     I have reviewed the Nursing Notes. I have reviewed the NPO Status.   I have reviewed the Medications.     Review of Systems  Anesthesia Hx:             Denies Family Hx of Anesthesia complications.    Denies Personal Hx of Anesthesia complications.                    Social:  Former Smoker, No Alcohol Use       Hematology/Oncology:  Hematology Normal   Oncology Normal                                   EENT/Dental:  EENT/Dental Normal           Cardiovascular:     Hypertension, well controlled              ECG has been reviewed.                          Pulmonary:  Pulmonary Normal                       Renal/:  Renal/ Normal                 Hepatic/GI:  Hepatic/GI Normal                 Musculoskeletal:  Musculoskeletal Normal                Neurological:  Neurology Normal                                      Endocrine:  Diabetes, well controlled, type 2           Dermatological:  Skin Normal    Psych:  Psychiatric Normal                    Physical Exam  General: Cooperative, Alert and Oriented    Airway:  Mallampati: II   Mouth Opening: Normal  TM Distance: Normal  Tongue: Normal  Neck ROM: Normal ROM    Dental:  Intact        Anesthesia Plan  Type of Anesthesia, risks & benefits discussed:    Anesthesia Type: Gen ETT  Intra-op Monitoring Plan: Standard ASA Monitors  Post Op Pain Control Plan: multimodal analgesia  Induction:  IV  Airway Plan: Direct  Informed Consent: Informed consent signed with the Patient and all parties understand the risks and agree with anesthesia plan.  All questions answered. Patient consented to blood products? Yes  ASA Score: 3    Ready For Surgery From Anesthesia Perspective.     .

## 2023-12-28 ENCOUNTER — ANESTHESIA (OUTPATIENT)
Dept: SURGERY | Facility: HOSPITAL | Age: 53
End: 2023-12-28
Payer: MEDICAID

## 2023-12-28 ENCOUNTER — HOSPITAL ENCOUNTER (OUTPATIENT)
Facility: HOSPITAL | Age: 53
Discharge: HOME | End: 2023-12-28
Attending: SURGERY | Admitting: SURGERY
Payer: MEDICAID

## 2023-12-28 VITALS
WEIGHT: 227 LBS | BODY MASS INDEX: 37.82 KG/M2 | HEART RATE: 65 BPM | TEMPERATURE: 97 F | HEIGHT: 65 IN | DIASTOLIC BLOOD PRESSURE: 95 MMHG | RESPIRATION RATE: 16 BRPM | OXYGEN SATURATION: 97 % | SYSTOLIC BLOOD PRESSURE: 146 MMHG

## 2023-12-28 VITALS — SYSTOLIC BLOOD PRESSURE: 124 MMHG | HEART RATE: 71 BPM | OXYGEN SATURATION: 97 % | DIASTOLIC BLOOD PRESSURE: 73 MMHG

## 2023-12-28 DIAGNOSIS — K81.1 CHRONIC CHOLECYSTITIS: Primary | ICD-10-CM

## 2023-12-28 PROCEDURE — 63600175 PHARM REV CODE 636 W HCPCS: Performed by: NURSE ANESTHETIST, CERTIFIED REGISTERED

## 2023-12-28 PROCEDURE — 63600175 PHARM REV CODE 636 W HCPCS: Performed by: SURGERY

## 2023-12-28 PROCEDURE — 37000009 HC ANESTHESIA EA ADD 15 MINS: Performed by: SURGERY

## 2023-12-28 PROCEDURE — D9220A PRA ANESTHESIA: Mod: ,,, | Performed by: NURSE ANESTHETIST, CERTIFIED REGISTERED

## 2023-12-28 PROCEDURE — 71000015 HC POSTOP RECOV 1ST HR: Performed by: SURGERY

## 2023-12-28 PROCEDURE — D9220A PRA ANESTHESIA: ICD-10-PCS | Mod: ,,, | Performed by: NURSE ANESTHETIST, CERTIFIED REGISTERED

## 2023-12-28 PROCEDURE — 36000709 HC OR TIME LEV III EA ADD 15 MIN: Performed by: SURGERY

## 2023-12-28 PROCEDURE — 27201423 OPTIME MED/SURG SUP & DEVICES STERILE SUPPLY: Performed by: SURGERY

## 2023-12-28 PROCEDURE — 71000033 HC RECOVERY, INTIAL HOUR: Performed by: SURGERY

## 2023-12-28 PROCEDURE — 37000008 HC ANESTHESIA 1ST 15 MINUTES: Performed by: SURGERY

## 2023-12-28 PROCEDURE — 88304 TISSUE EXAM BY PATHOLOGIST: CPT | Performed by: SURGERY

## 2023-12-28 PROCEDURE — 25000003 PHARM REV CODE 250: Performed by: ANESTHESIOLOGY

## 2023-12-28 PROCEDURE — 71000016 HC POSTOP RECOV ADDL HR: Performed by: SURGERY

## 2023-12-28 PROCEDURE — 25000003 PHARM REV CODE 250: Performed by: NURSE ANESTHETIST, CERTIFIED REGISTERED

## 2023-12-28 PROCEDURE — 63600175 PHARM REV CODE 636 W HCPCS

## 2023-12-28 PROCEDURE — 63600175 PHARM REV CODE 636 W HCPCS: Performed by: ANESTHESIOLOGY

## 2023-12-28 PROCEDURE — 36000708 HC OR TIME LEV III 1ST 15 MIN: Performed by: SURGERY

## 2023-12-28 PROCEDURE — 25000003 PHARM REV CODE 250: Performed by: SURGERY

## 2023-12-28 RX ORDER — HYDROCODONE BITARTRATE AND ACETAMINOPHEN 5; 325 MG/1; MG/1
1 TABLET ORAL EVERY 6 HOURS PRN
Status: DISCONTINUED | OUTPATIENT
Start: 2023-12-28 | End: 2023-12-28 | Stop reason: HOSPADM

## 2023-12-28 RX ORDER — ROCURONIUM BROMIDE 10 MG/ML
INJECTION, SOLUTION INTRAVENOUS
Status: DISCONTINUED | OUTPATIENT
Start: 2023-12-28 | End: 2023-12-28

## 2023-12-28 RX ORDER — SODIUM CHLORIDE 9 MG/ML
INJECTION, SOLUTION INTRAVENOUS CONTINUOUS
Status: DISCONTINUED | OUTPATIENT
Start: 2023-12-28 | End: 2023-12-28 | Stop reason: HOSPADM

## 2023-12-28 RX ORDER — ACETAMINOPHEN 500 MG
1000 TABLET ORAL
Status: COMPLETED | OUTPATIENT
Start: 2023-12-28 | End: 2023-12-28

## 2023-12-28 RX ORDER — CEFAZOLIN SODIUM 2 G/50ML
2 SOLUTION INTRAVENOUS
Status: COMPLETED | OUTPATIENT
Start: 2023-12-28 | End: 2023-12-28

## 2023-12-28 RX ORDER — DEXAMETHASONE SODIUM PHOSPHATE 4 MG/ML
INJECTION, SOLUTION INTRA-ARTICULAR; INTRALESIONAL; INTRAMUSCULAR; INTRAVENOUS; SOFT TISSUE
Status: DISCONTINUED | OUTPATIENT
Start: 2023-12-28 | End: 2023-12-28

## 2023-12-28 RX ORDER — TRANEXAMIC ACID 100 MG/ML
INJECTION, SOLUTION INTRAVENOUS
Status: DISCONTINUED | OUTPATIENT
Start: 2023-12-28 | End: 2023-12-28

## 2023-12-28 RX ORDER — GABAPENTIN 300 MG/1
600 CAPSULE ORAL
Status: COMPLETED | OUTPATIENT
Start: 2023-12-28 | End: 2023-12-28

## 2023-12-28 RX ORDER — FENTANYL CITRATE 50 UG/ML
25 INJECTION, SOLUTION INTRAMUSCULAR; INTRAVENOUS EVERY 5 MIN PRN
Status: DISCONTINUED | OUTPATIENT
Start: 2023-12-28 | End: 2023-12-28

## 2023-12-28 RX ORDER — LIDOCAINE HYDROCHLORIDE 20 MG/ML
INJECTION INTRAVENOUS
Status: DISCONTINUED | OUTPATIENT
Start: 2023-12-28 | End: 2023-12-28

## 2023-12-28 RX ORDER — SODIUM CHLORIDE, SODIUM LACTATE, POTASSIUM CHLORIDE, CALCIUM CHLORIDE 600; 310; 30; 20 MG/100ML; MG/100ML; MG/100ML; MG/100ML
INJECTION, SOLUTION INTRAVENOUS CONTINUOUS
Status: ACTIVE | OUTPATIENT
Start: 2023-12-28

## 2023-12-28 RX ORDER — SODIUM CHLORIDE 0.9 % (FLUSH) 0.9 %
10 SYRINGE (ML) INJECTION
Status: DISCONTINUED | OUTPATIENT
Start: 2023-12-28 | End: 2023-12-28

## 2023-12-28 RX ORDER — ONDANSETRON HYDROCHLORIDE 2 MG/ML
INJECTION, SOLUTION INTRAMUSCULAR; INTRAVENOUS
Status: DISCONTINUED | OUTPATIENT
Start: 2023-12-28 | End: 2023-12-28

## 2023-12-28 RX ORDER — ONDANSETRON 2 MG/ML
4 INJECTION INTRAMUSCULAR; INTRAVENOUS EVERY 12 HOURS PRN
Status: DISCONTINUED | OUTPATIENT
Start: 2023-12-28 | End: 2023-12-28 | Stop reason: HOSPADM

## 2023-12-28 RX ORDER — HYDROMORPHONE HYDROCHLORIDE 2 MG/ML
0.2 INJECTION, SOLUTION INTRAMUSCULAR; INTRAVENOUS; SUBCUTANEOUS EVERY 5 MIN PRN
Status: DISCONTINUED | OUTPATIENT
Start: 2023-12-28 | End: 2023-12-28

## 2023-12-28 RX ORDER — PROPOFOL 10 MG/ML
VIAL (ML) INTRAVENOUS
Status: DISCONTINUED | OUTPATIENT
Start: 2023-12-28 | End: 2023-12-28

## 2023-12-28 RX ORDER — HYDROMORPHONE HYDROCHLORIDE 2 MG/ML
INJECTION, SOLUTION INTRAMUSCULAR; INTRAVENOUS; SUBCUTANEOUS
Status: DISCONTINUED | OUTPATIENT
Start: 2023-12-28 | End: 2023-12-28

## 2023-12-28 RX ORDER — BUPIVACAINE HYDROCHLORIDE 2.5 MG/ML
INJECTION, SOLUTION EPIDURAL; INFILTRATION; INTRACAUDAL
Status: DISCONTINUED | OUTPATIENT
Start: 2023-12-28 | End: 2023-12-28 | Stop reason: HOSPADM

## 2023-12-28 RX ORDER — TRAMADOL HYDROCHLORIDE 50 MG/1
50 TABLET ORAL
Status: COMPLETED | OUTPATIENT
Start: 2023-12-28 | End: 2023-12-28

## 2023-12-28 RX ORDER — FENTANYL CITRATE 50 UG/ML
INJECTION, SOLUTION INTRAMUSCULAR; INTRAVENOUS
Status: DISCONTINUED | OUTPATIENT
Start: 2023-12-28 | End: 2023-12-28

## 2023-12-28 RX ORDER — HYDROMORPHONE HYDROCHLORIDE 2 MG/ML
0.5 INJECTION, SOLUTION INTRAMUSCULAR; INTRAVENOUS; SUBCUTANEOUS EVERY 6 HOURS PRN
Status: DISCONTINUED | OUTPATIENT
Start: 2023-12-28 | End: 2023-12-28 | Stop reason: HOSPADM

## 2023-12-28 RX ORDER — DEXMEDETOMIDINE HYDROCHLORIDE 100 UG/ML
INJECTION, SOLUTION INTRAVENOUS
Status: DISCONTINUED | OUTPATIENT
Start: 2023-12-28 | End: 2023-12-28

## 2023-12-28 RX ADMIN — TRAMADOL HYDROCHLORIDE 50 MG: 50 TABLET, COATED ORAL at 12:12

## 2023-12-28 RX ADMIN — LIDOCAINE HYDROCHLORIDE 100 MG: 20 INJECTION, SOLUTION INTRAVENOUS at 01:12

## 2023-12-28 RX ADMIN — DEXAMETHASONE SODIUM PHOSPHATE 12 MG: 4 INJECTION, SOLUTION INTRA-ARTICULAR; INTRALESIONAL; INTRAMUSCULAR; INTRAVENOUS; SOFT TISSUE at 01:12

## 2023-12-28 RX ADMIN — ROCURONIUM BROMIDE 50 MG: 10 INJECTION, SOLUTION INTRAVENOUS at 01:12

## 2023-12-28 RX ADMIN — CEFAZOLIN SODIUM 2 G: 2 SOLUTION INTRAVENOUS at 01:12

## 2023-12-28 RX ADMIN — HYDROCODONE BITARTRATE AND ACETAMINOPHEN 1 TABLET: 5; 325 TABLET ORAL at 03:12

## 2023-12-28 RX ADMIN — DEXMEDETOMIDINE HYDROCHLORIDE 10 MCG: 100 INJECTION, SOLUTION INTRAVENOUS at 01:12

## 2023-12-28 RX ADMIN — SODIUM CHLORIDE, POTASSIUM CHLORIDE, SODIUM LACTATE AND CALCIUM CHLORIDE: 600; 310; 30; 20 INJECTION, SOLUTION INTRAVENOUS at 12:12

## 2023-12-28 RX ADMIN — Medication 130 MG: at 01:12

## 2023-12-28 RX ADMIN — SUGAMMADEX 200 MG: 100 INJECTION, SOLUTION INTRAVENOUS at 02:12

## 2023-12-28 RX ADMIN — DEXMEDETOMIDINE 10 MCG: 200 INJECTION, SOLUTION INTRAVENOUS at 01:12

## 2023-12-28 RX ADMIN — TRANEXAMIC ACID 1000 MG: 100 INJECTION INTRAVENOUS at 02:12

## 2023-12-28 RX ADMIN — GABAPENTIN 600 MG: 300 CAPSULE ORAL at 12:12

## 2023-12-28 RX ADMIN — Medication 70 MG: at 01:12

## 2023-12-28 RX ADMIN — FENTANYL CITRATE 100 MCG: 50 INJECTION, SOLUTION INTRAMUSCULAR; INTRAVENOUS at 01:12

## 2023-12-28 RX ADMIN — ONDANSETRON HYDROCHLORIDE 4 MG: 2 INJECTION, SOLUTION INTRAMUSCULAR; INTRAVENOUS at 01:12

## 2023-12-28 RX ADMIN — ACETAMINOPHEN 1000 MG: 500 TABLET, FILM COATED ORAL at 12:12

## 2023-12-28 RX ADMIN — HYDROMORPHONE HYDROCHLORIDE 0.4 MG: 2 INJECTION INTRAMUSCULAR; INTRAVENOUS; SUBCUTANEOUS at 02:12

## 2023-12-28 NOTE — DISCHARGE SUMMARY
[facility] - Periop Services  Discharge Note  Short Stay    Procedure(s) (LRB):  CHOLECYSTECTOMY, LAPAROSCOPIC, WITH CHOLANGIOGRAM (N/A) normal      OUTCOME: Patient tolerated treatment/procedure well without complication and is now ready for discharge.    DISPOSITION: Home or Self Care    FINAL DIAGNOSIS:  Chronic cholecystitis normal intraoperative cholangiography    FOLLOWUP: In clinic 1 week    DISCHARGE INSTRUCTIONS:    Discharge Procedure Orders   Diet general        TIME SPENT ON DISCHARGE:  Five minutes

## 2023-12-28 NOTE — ANESTHESIA PROCEDURE NOTES
Intubation    Date/Time: 12/28/2023 1:32 PM    Performed by: Elroy Martin CRNA  Authorized by: Oracio Patino DO    Intubation:     Induction:  Intravenous    Intubated:  Postinduction    Mask Ventilation:  Easy mask    Attempts:  2    Attempted By:  Student    Method of Intubation:  Direct    Blade:  Villarreal 2    Laryngeal View Grade: Grade IIA - cords partially seen      Attempted By (2nd Attempt):  Student    Method of Intubation (2nd Attempt):  Direct    Blade (2nd Attempt):  Villarreal 2    Laryngeal View Grade (2nd Attempt): Grade I - full view of cords      Difficult Airway Encountered?: No      Complications:  None    Airway Device:  Oral endotracheal tube    Airway Device Size:  7.5    Style/Cuff Inflation:  Cuffed (inflated to minimal occlusive pressure)    Tube secured:  21    Secured at:  The lips    Placement Verified By:  Capnometry and Colorimetric ETCO2 device    Complicating Factors:  None    Findings Post-Intubation:  BS equal bilateral and atraumatic/condition of teeth unchanged
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
## 2023-12-28 NOTE — TRANSFER OF CARE
"Anesthesia Transfer of Care Note    Patient: Raquel Byrne    Procedure(s) Performed: Procedure(s) (LRB):  CHOLECYSTECTOMY, LAPAROSCOPIC, WITH CHOLANGIOGRAM (N/A)    Patient location: PACU    Anesthesia Type: general    Transport from OR: Transported from OR on room air with adequate spontaneous ventilation    Post pain: adequate analgesia    Post assessment: no apparent anesthetic complications    Post vital signs: stable    Level of consciousness: responds to stimulation and sedated    Nausea/Vomiting: no nausea/vomiting    Complications: none    Transfer of care protocol was followed      Last vitals: Visit Vitals  /68   Pulse 73   Temp 36 °C (96.8 °F) (Skin)   Resp (!) 24   Ht 5' 5" (1.651 m)   Wt 103 kg (227 lb)   SpO2 (!) 88%   Breastfeeding No   BMI 37.77 kg/m²     "

## 2023-12-28 NOTE — ANESTHESIA POSTPROCEDURE EVALUATION
Anesthesia Post Evaluation    Patient: Raquel Byrne    Procedure(s) Performed: Procedure(s) (LRB):  CHOLECYSTECTOMY, LAPAROSCOPIC, WITH CHOLANGIOGRAM (N/A)    Final Anesthesia Type: general      Patient location during evaluation: PACU  Patient participation: Yes- Able to Participate  Level of consciousness: awake and alert  Post-procedure vital signs: reviewed and stable  Pain management: adequate  Airway patency: patent  HEIDE mitigation strategies: Multimodal analgesia, Verification of full reversal of neuromuscular block and Extubation and recovery carried out in lateral, semiupright, or other nonsupine position  PONV status at discharge: No PONV  Anesthetic complications: no      Cardiovascular status: hemodynamically stable  Respiratory status: unassisted, spontaneous ventilation and room air  Hydration status: euvolemic  Follow-up not needed.              Vitals Value Taken Time   /65 12/28/23 1426   Temp 36 °C (96.8 °F) 12/28/23 1423   Pulse 70 12/28/23 1426   Resp 22 12/28/23 1426   SpO2 92 % 12/28/23 1426   Vitals shown include unvalidated device data.      No case tracking events are documented in the log.      Pain/Lori Score: Pain Rating Prior to Med Admin: 2 (12/28/2023 12:43 PM)  Lori Score: 5 (12/28/2023  2:25 PM)

## 2023-12-29 ENCOUNTER — PATIENT MESSAGE (OUTPATIENT)
Dept: ADMINISTRATIVE | Facility: OTHER | Age: 53
End: 2023-12-29
Payer: MEDICAID

## 2023-12-29 LAB — POCT GLUCOSE: 129 MG/DL (ref 70–110)

## 2023-12-30 ENCOUNTER — PATIENT MESSAGE (OUTPATIENT)
Dept: ADMINISTRATIVE | Facility: OTHER | Age: 53
End: 2023-12-30
Payer: MEDICAID

## 2024-01-02 LAB — PSYCHE PATHOLOGY RESULT: NORMAL

## 2024-03-15 DIAGNOSIS — I10 HYPERTENSION, UNSPECIFIED TYPE: ICD-10-CM

## 2024-03-15 RX ORDER — METOPROLOL SUCCINATE 25 MG/1
25 TABLET, EXTENDED RELEASE ORAL
Qty: 30 TABLET | Refills: 0 | Status: SHIPPED | OUTPATIENT
Start: 2024-03-15 | End: 2024-04-15 | Stop reason: SDUPTHER

## 2024-03-28 ENCOUNTER — OFFICE VISIT (OUTPATIENT)
Dept: FAMILY MEDICINE | Facility: CLINIC | Age: 54
End: 2024-03-28
Payer: MEDICAID

## 2024-03-28 ENCOUNTER — LAB VISIT (OUTPATIENT)
Dept: LAB | Facility: HOSPITAL | Age: 54
End: 2024-03-28
Attending: NURSE PRACTITIONER
Payer: MEDICAID

## 2024-03-28 VITALS
SYSTOLIC BLOOD PRESSURE: 135 MMHG | HEART RATE: 98 BPM | HEIGHT: 65 IN | TEMPERATURE: 99 F | OXYGEN SATURATION: 97 % | BODY MASS INDEX: 39.05 KG/M2 | RESPIRATION RATE: 16 BRPM | WEIGHT: 234.38 LBS | DIASTOLIC BLOOD PRESSURE: 86 MMHG

## 2024-03-28 DIAGNOSIS — Z00.00 WELLNESS EXAMINATION: ICD-10-CM

## 2024-03-28 DIAGNOSIS — E66.9 OBESITY (BMI 35.0-39.9 WITHOUT COMORBIDITY): ICD-10-CM

## 2024-03-28 DIAGNOSIS — B37.89 CANDIDIASIS OF BREAST: ICD-10-CM

## 2024-03-28 DIAGNOSIS — R45.86 REBOUND MOOD SWINGS: ICD-10-CM

## 2024-03-28 DIAGNOSIS — Z00.00 WELLNESS EXAMINATION: Primary | ICD-10-CM

## 2024-03-28 DIAGNOSIS — R11.2 NAUSEA AND VOMITING, UNSPECIFIED VOMITING TYPE: ICD-10-CM

## 2024-03-28 LAB
ALBUMIN SERPL-MCNC: 3.6 G/DL (ref 3.5–5)
ALBUMIN/GLOB SERPL: 0.7 RATIO (ref 1.1–2)
ALP SERPL-CCNC: 83 UNIT/L (ref 40–150)
ALT SERPL-CCNC: 52 UNIT/L (ref 0–55)
AST SERPL-CCNC: 48 UNIT/L (ref 5–34)
BASOPHILS # BLD AUTO: 0.02 X10(3)/MCL
BASOPHILS NFR BLD AUTO: 0.6 %
BILIRUB SERPL-MCNC: 1.1 MG/DL
BUN SERPL-MCNC: 13 MG/DL (ref 9.8–20.1)
CALCIUM SERPL-MCNC: 9.6 MG/DL (ref 8.4–10.2)
CHLORIDE SERPL-SCNC: 107 MMOL/L (ref 98–107)
CHOLEST SERPL-MCNC: 224 MG/DL
CHOLEST/HDLC SERPL: 4 {RATIO} (ref 0–5)
CO2 SERPL-SCNC: 25 MMOL/L (ref 22–29)
CREAT SERPL-MCNC: 0.78 MG/DL (ref 0.55–1.02)
EOSINOPHIL # BLD AUTO: 0.15 X10(3)/MCL (ref 0–0.9)
EOSINOPHIL NFR BLD AUTO: 4.4 %
ERYTHROCYTE [DISTWIDTH] IN BLOOD BY AUTOMATED COUNT: 12.4 % (ref 11.5–17)
EST. AVERAGE GLUCOSE BLD GHB EST-MCNC: 151.3 MG/DL
GFR SERPLBLD CREATININE-BSD FMLA CKD-EPI: >60 MLS/MIN/1.73/M2
GLOBULIN SER-MCNC: 4.9 GM/DL (ref 2.4–3.5)
GLUCOSE SERPL-MCNC: 258 MG/DL (ref 74–100)
HBA1C MFR BLD: 6.9 %
HCT VFR BLD AUTO: 43 % (ref 37–47)
HDLC SERPL-MCNC: 60 MG/DL (ref 35–60)
HGB BLD-MCNC: 14.4 G/DL (ref 12–16)
IMM GRANULOCYTES # BLD AUTO: 0 X10(3)/MCL (ref 0–0.04)
IMM GRANULOCYTES NFR BLD AUTO: 0 %
LDLC SERPL CALC-MCNC: 141 MG/DL (ref 50–140)
LYMPHOCYTES # BLD AUTO: 1 X10(3)/MCL (ref 0.6–4.6)
LYMPHOCYTES NFR BLD AUTO: 29.4 %
MCH RBC QN AUTO: 32.9 PG (ref 27–31)
MCHC RBC AUTO-ENTMCNC: 33.5 G/DL (ref 33–36)
MCV RBC AUTO: 98.2 FL (ref 80–94)
MONOCYTES # BLD AUTO: 0.38 X10(3)/MCL (ref 0.1–1.3)
MONOCYTES NFR BLD AUTO: 11.2 %
NEUTROPHILS # BLD AUTO: 1.85 X10(3)/MCL (ref 2.1–9.2)
NEUTROPHILS NFR BLD AUTO: 54.4 %
PLATELET # BLD AUTO: 114 X10(3)/MCL (ref 130–400)
PMV BLD AUTO: 10.3 FL (ref 7.4–10.4)
POTASSIUM SERPL-SCNC: 4 MMOL/L (ref 3.5–5.1)
PROT SERPL-MCNC: 8.5 GM/DL (ref 6.4–8.3)
RBC # BLD AUTO: 4.38 X10(6)/MCL (ref 4.2–5.4)
SODIUM SERPL-SCNC: 138 MMOL/L (ref 136–145)
TRIGL SERPL-MCNC: 115 MG/DL (ref 37–140)
TSH SERPL-ACNC: 1.92 UIU/ML (ref 0.35–4.94)
VLDLC SERPL CALC-MCNC: 23 MG/DL
WBC # SPEC AUTO: 3.4 X10(3)/MCL (ref 4.5–11.5)

## 2024-03-28 PROCEDURE — 80053 COMPREHEN METABOLIC PANEL: CPT

## 2024-03-28 PROCEDURE — 36415 COLL VENOUS BLD VENIPUNCTURE: CPT

## 2024-03-28 PROCEDURE — 99396 PREV VISIT EST AGE 40-64: CPT | Mod: S$PBB,,, | Performed by: NURSE PRACTITIONER

## 2024-03-28 PROCEDURE — 3079F DIAST BP 80-89 MM HG: CPT | Mod: CPTII,,, | Performed by: NURSE PRACTITIONER

## 2024-03-28 PROCEDURE — 85025 COMPLETE CBC W/AUTO DIFF WBC: CPT

## 2024-03-28 PROCEDURE — 83036 HEMOGLOBIN GLYCOSYLATED A1C: CPT

## 2024-03-28 PROCEDURE — 84443 ASSAY THYROID STIM HORMONE: CPT

## 2024-03-28 PROCEDURE — 1160F RVW MEDS BY RX/DR IN RCRD: CPT | Mod: CPTII,,, | Performed by: NURSE PRACTITIONER

## 2024-03-28 PROCEDURE — 80061 LIPID PANEL: CPT

## 2024-03-28 PROCEDURE — 99214 OFFICE O/P EST MOD 30 MIN: CPT | Mod: PBBFAC | Performed by: NURSE PRACTITIONER

## 2024-03-28 PROCEDURE — 1159F MED LIST DOCD IN RCRD: CPT | Mod: CPTII,,, | Performed by: NURSE PRACTITIONER

## 2024-03-28 PROCEDURE — 3008F BODY MASS INDEX DOCD: CPT | Mod: CPTII,,, | Performed by: NURSE PRACTITIONER

## 2024-03-28 PROCEDURE — 3075F SYST BP GE 130 - 139MM HG: CPT | Mod: CPTII,,, | Performed by: NURSE PRACTITIONER

## 2024-03-28 PROCEDURE — 99999 PR PBB SHADOW E&M-EST. PATIENT-LVL IV: CPT | Mod: PBBFAC,,, | Performed by: NURSE PRACTITIONER

## 2024-03-28 RX ORDER — FLUOXETINE HYDROCHLORIDE 20 MG/1
20 CAPSULE ORAL EVERY MORNING
Qty: 30 CAPSULE | Refills: 0 | Status: SHIPPED | OUTPATIENT
Start: 2024-03-28 | End: 2024-05-20 | Stop reason: SDUPTHER

## 2024-03-28 RX ORDER — GABAPENTIN 300 MG/1
300 CAPSULE ORAL 3 TIMES DAILY
COMMUNITY
Start: 2024-02-09 | End: 2024-05-30

## 2024-03-28 RX ORDER — ONDANSETRON 8 MG/1
8 TABLET, ORALLY DISINTEGRATING ORAL EVERY 6 HOURS PRN
Qty: 45 TABLET | Refills: 0 | Status: SHIPPED | OUTPATIENT
Start: 2024-03-28

## 2024-03-28 NOTE — PROGRESS NOTES
"  Family Medicine    Patient ID: [id number]     Chief Complaint: Follow-up (DM follow up /Patient also has c/o redness, smell, and frequent sweating that is "thick or sticky" in texture under her breasts and near her groin and vaginal area. She stated that this is a frequent occurrence since she started menopause a year ago )      HPI:     Raquel Byrne is here today for medication refill.  She noted that she does not have follow visit with her mental health provider.  Today she is asking for refill of medications.    Past Medical History:   Diagnosis Date    Abnormal vaginal bleeding     Anxiety     Constipation     Hypertension     Medial meniscus tear     right knee    Ovarian cyst     Torn ACL     right knee        Past Surgical History:   Procedure Laterality Date    INCISION AND DRAINAGE Left     leg left    LAPAROSCOPIC CHOLECYSTECTOMY WITH CHOLANGIOGRAPHY N/A 12/28/2023    Procedure: CHOLECYSTECTOMY, LAPAROSCOPIC, WITH CHOLANGIOGRAM;  Surgeon: Prabhu Hernandes MD;  Location: [facility];  Service: General;  Laterality: N/A;    TUBAL LIGATION          Social History     Tobacco Use    Smoking status: Former     Types: Cigarettes    Smokeless tobacco: Never   Substance and Sexual Activity    Alcohol use: Never    Drug use: Yes     Types: Marijuana     Comment: Medical    Sexual activity: Not Currently        Current Outpatient Medications   Medication Instructions    CAPLYTA 21 mg Cap 1 capsule, Oral, Nightly    clonazePAM (KLONOPIN) 1 mg, Oral, 2 times daily, as needed for anxiety.    FLUoxetine 20 mg, Oral, Every morning    gabapentin (NEURONTIN) 300 mg, Oral, 3 times daily    metoprolol succinate (TOPROL-XL) 25 mg, Oral    ondansetron (ZOFRAN-ODT) 8 mg, Oral, Every 6 hours PRN       Review of patient's allergies indicates:   Allergen Reactions    Latex, natural rubber Hives, Itching and Swelling        Patient Care Team:  Renee Love FNP as PCP - General (Family Medicine)  Raji Gomes MD as Consulting " "Physician (Orthopedic Surgery)  Medicine, [facility] (Physical Therapy)  Esha Chinchilla [facility] -  Erika, CLEMENTE Tucker as Nurse Practitioner (Behavioral Health)     Subjective:     Review of Systems   All other systems reviewed and are negative.      12 point review of systems conducted, negative except as stated in the history of present illness. See HPI for details.    Objective:     Visit Vitals  /86 (BP Location: Left arm, Patient Position: Sitting, BP Method: Large (Automatic))   Pulse 98   Temp 98.7 °F (37.1 °C) (Temporal)   Resp 16   Ht 5' 5" (1.651 m)   Wt 106.3 kg (234 lb 6.4 oz)   SpO2 97%   BMI 39.01 kg/m²       Physical Exam  Vitals and nursing note reviewed.   Constitutional:       Appearance: Normal appearance.   Cardiovascular:      Rate and Rhythm: Normal rate and regular rhythm.   Pulmonary:      Effort: Pulmonary effort is normal.      Breath sounds: Normal breath sounds and air entry.   Skin:     General: Skin is warm and dry.   Neurological:      General: No focal deficit present.      Mental Status: She is alert and oriented to person, place, and time.   Psychiatric:         Mood and Affect: Mood normal.         Behavior: Behavior is cooperative.         Labs Reviewed:   Labs reviewed with patient, verbalized understanding.  Chemistry:  Lab Results   Component Value Date     (L) 12/26/2023    K 3.8 12/26/2023    CHLORIDE 103 12/26/2023    BUN 9.0 (L) 12/26/2023    CREATININE 0.82 12/26/2023    EGFRNORACEVR >60 12/26/2023    GLUCOSE 177 (H) 12/26/2023    CALCIUM 9.6 12/26/2023    ALKPHOS 71 12/26/2023    LABPROT 8.5 (H) 12/26/2023    ALBUMIN 3.4 (L) 12/26/2023    AST 38 (H) 12/26/2023    ALT 41 12/26/2023    TSH 3.269 04/05/2023        Lab Results   Component Value Date    HGBA1C 6.6 07/25/2023        Hematology:  Lab Results   Component Value Date    WBC 3.34 (L) 12/26/2023    HGB 14.2 12/26/2023    HCT 42.4 12/26/2023     (L) 12/26/2023 "       Lipid Panel:  Lab Results   Component Value Date    CHOL 209 (H) 04/21/2023    HDL 59 04/21/2023    .00 04/21/2023    TRIG 81 04/21/2023    TOTALCHOLEST 4 04/21/2023        Urine:  Lab Results   Component Value Date    COLORUA Yellow 04/05/2023    APPEARANCEUA Clear 04/05/2023    SGUA >=1.030 04/05/2023    PHUA 5.5 04/05/2023    PROTEINUA 1+ (A) 04/05/2023    GLUCOSEUA Negative 04/05/2023    KETONESUA Negative 04/05/2023    BLOODUA Negative 04/05/2023    NITRITESUA Negative 04/05/2023    LEUKOCYTESUR Negative 04/05/2023    RBCUA 0-2 04/05/2023    WBCUA 0-2 04/05/2023    BACTERIA Rare 04/05/2023    CREATRANDUR 196.2 (H) 07/25/2023        Assessment:       ICD-10-CM ICD-9-CM   1. Wellness examination  Z00.00 V70.0   2. Candidiasis of breast  B37.89 112.89   3. Obesity (BMI 35.0-39.9 without comorbidity)  E66.9 278.00   4. Nausea and vomiting, unspecified vomiting type  R11.2 787.01   5. Rebound mood swings  R45.86 296.99        Plan:     1. Wellness examination  -     CBC Auto Differential; Future; Expected date: 03/28/2024  -     Comprehensive Metabolic Panel; Future; Expected date: 03/28/2024  -     TSH; Future; Expected date: 03/28/2024  -     Hemoglobin A1C; Future; Expected date: 03/28/2024  -     Lipid Panel; Future; Expected date: 03/28/2024    2. Candidiasis of breast  Assessment & Plan:  Notes swelling with a sour smell underneath her breasts with rash that is red and painful.    Advised she get Lotrisone cream.  Advised she not wear the same bra without washing it.  Advised she wash and dry the areas very well.      3. Obesity (BMI 35.0-39.9 without comorbidity)    4. Nausea and vomiting, unspecified vomiting type  Assessment & Plan:  Nausea and vomiting in the morning.  Medications refilled  Zofran 8 mg    Orders:  -     ondansetron (ZOFRAN-ODT) 8 MG TbDL; Take 1 tablet (8 mg total) by mouth every 6 (six) hours as needed.  Dispense: 45 tablet; Refill: 0    5. Rebound mood swings  Assessment &  Plan:  Noted that she is having anger issues, she noted that she is also having some relief with the fluoxetine, medication is increased from 10 mg to 20 mg at this time.  She is advised to return to clinic in 2 weeks to assess the efficacy of the medication.    Orders:  -     FLUoxetine 20 MG capsule; Take 1 capsule (20 mg total) by mouth every morning.  Dispense: 30 capsule; Refill: 0         No follow-ups on file. In addition to their scheduled follow up, the patient has also been instructed to follow up on as needed basis.     Future Appointments   Date Time Provider Department Center   3/28/2024 11:40 AM LAB, [facility] [place]   3/31/2025  9:00 AM [facility] Lori Cox, FNP

## 2024-03-28 NOTE — ASSESSMENT & PLAN NOTE
Noted that she is having anger issues, she noted that she is also having some relief with the fluoxetine, medication is increased from 10 mg to 20 mg at this time.  She is advised to return to clinic in 2 weeks to assess the efficacy of the medication.

## 2024-03-28 NOTE — ASSESSMENT & PLAN NOTE
Notes swelling with a sour smell underneath her breasts with rash that is red and painful.    Advised she get Lotrisone cream.  Advised she not wear the same bra without washing it.  Advised she wash and dry the areas very well.

## 2024-04-01 ENCOUNTER — TELEPHONE (OUTPATIENT)
Dept: FAMILY MEDICINE | Facility: CLINIC | Age: 54
End: 2024-04-01
Payer: MEDICAID

## 2024-04-01 DIAGNOSIS — E11.9 TYPE 2 DIABETES MELLITUS WITHOUT COMPLICATION, WITHOUT LONG-TERM CURRENT USE OF INSULIN: ICD-10-CM

## 2024-04-01 DIAGNOSIS — R73.9 HYPERGLYCEMIA: Primary | ICD-10-CM

## 2024-04-01 RX ORDER — METFORMIN HYDROCHLORIDE 500 MG/1
500 TABLET, EXTENDED RELEASE ORAL
Qty: 90 TABLET | Refills: 3 | Status: SHIPPED | OUTPATIENT
Start: 2024-04-01 | End: 2025-04-01

## 2024-04-01 NOTE — PROGRESS NOTES
I would like to get her started on metformin, if she would be willing to take it. I think it would level out her blood sugars.  Thank you   Renee

## 2024-04-01 NOTE — TELEPHONE ENCOUNTER
----- Message from Lico Soriano MA sent at 4/1/2024  3:09 PM CDT -----  Regarding: FW: Labs  Patient had questions and concerns regarding labs she just completed. She would like a call back  ----- Message -----  From: Amalia Villarreal  Sent: 4/1/2024   2:57 PM CDT  To: [facility] Clinical Support Staff  Subject: Labs                                             Patient got lab results back and she had questions/concerns.  Can you please call her?  Thanks

## 2024-04-01 NOTE — TELEPHONE ENCOUNTER
Called pt, discussed labs with her, started pt on metformin low dose, advised she RTC in 1 mo, sooner if needed, she is wondering about a call from the MHNP, she noted that she was not able to see her. She is referred from Radha, to a different NP.

## 2024-04-01 NOTE — TELEPHONE ENCOUNTER
Spoke to Radha the [facility] office and they stated the reason she was dismissed was due to no shows and unable to get in contact with patient regarding visits. They referred her out to someone else because of this but did not state who.

## 2024-04-03 ENCOUNTER — TELEPHONE (OUTPATIENT)
Dept: FAMILY MEDICINE | Facility: CLINIC | Age: 54
End: 2024-04-03
Payer: MEDICAID

## 2024-04-03 DIAGNOSIS — F41.9 ANXIETY: Primary | ICD-10-CM

## 2024-04-03 RX ORDER — CLONAZEPAM 1 MG/1
1 TABLET ORAL 2 TIMES DAILY
Qty: 60 TABLET | Refills: 0 | Status: SHIPPED | OUTPATIENT
Start: 2024-04-03 | End: 2024-05-20 | Stop reason: SDUPTHER

## 2024-04-03 NOTE — TELEPHONE ENCOUNTER
Spoke with patient and she asked said she was referred to [facility] in [place] for psych by carmen pulliam APRIL but she has not seen her yet and was wondering if she could have her klonopin refilled until she is able to see the new psych NP.

## 2024-04-03 NOTE — TELEPHONE ENCOUNTER
----- Message from Amy Parekh sent at 4/3/2024 11:56 AM CDT -----  Regarding: Patient Call  .Type:  Patient Returning Call    Who Called:pt  Who Left Message for Patient:office staff  Does the patient know what this is regarding?:referral   Would the patient rather a call back or a response via [facility]?   Best Call Back Number:[phone]  Additional Information: pt is calling about her psychologist referral,please call

## 2024-04-10 NOTE — PLAN OF CARE
Discharge Instructions for  [facility]  [address] [place] [address]  [address]  [place] [address]  Phone [phone]   Fax [phone]      NAME:  Gilbert Yo  YOB: 1959  MEDICAL RECORD NUMBER:  [id number]  DATE:  4/10/2024    Return Appointment:   1 week with Abiel Nicholas DO        Left Knee:  Cleanse wound with Normal Saline.  Vashe moistened gauze to wound bed and periwound for 1 minute   Aquacel Ag to wound bed.  Cover with ABD  Wrap with Kerlix.  Dressing change 1 - 2 x daily as needed.  Follow dressing with Tubigrip.  Follow with Immobilizer, as prescribed by your surgeon.    Home Health for dressing changes 2x each week. Patient to change at all other times. Please order enough supplies for patient to keep wound dressing dry and intact.    Compression: Tubigrip from base of toes to mid thigh using E, F, and G to graduate the compression appropriately.  Elevate lower legs when not ambulating. Activity is beneficial.  Protein 100 gms daily. Intake throughout the day.     DC wound vac, patient to return vac to company per instructions.    Continue IV antibiotics - home self-administration - as prescribed by surgeon    Should you experience increased redness, swelling, pain, foul odor, size of wound(s), or have a temperature over 101 degrees please contact the [facility]aling Center at [phone] or if after hours contact your primary care physician or go to the hospital emergency department.    PLEASE NOTE: IF YOU ARE UNABLE TO OBTAIN WOUND SUPPLIES, CONTINUE TO USE THE SUPPLIES YOU HAVE AVAILABLE UNTIL YOU ARE ABLE TO REACH US. IT IS MOST IMPORTANT TO KEEP THE WOUND COVERED AT ALL TIMES.    Electronically signed Ce Ramirez RN on 4/10/2024 at 4:01 PM     Patient is back to her room in stable condition, No difficulty breathing or swallowing, No s/s of distress noted, Close to nurse's station, SR up x 2 with call bell in reach, Family member at bedside,

## 2024-04-15 ENCOUNTER — TELEPHONE (OUTPATIENT)
Dept: FAMILY MEDICINE | Facility: CLINIC | Age: 54
End: 2024-04-15
Payer: MEDICAID

## 2024-04-15 DIAGNOSIS — I10 HYPERTENSION, UNSPECIFIED TYPE: ICD-10-CM

## 2024-04-15 RX ORDER — METOPROLOL SUCCINATE 25 MG/1
25 TABLET, EXTENDED RELEASE ORAL DAILY
Qty: 30 TABLET | Refills: 3 | Status: SHIPPED | OUTPATIENT
Start: 2024-04-15

## 2024-04-15 NOTE — TELEPHONE ENCOUNTER
----- Message from Amalia Villarreal sent at 4/15/2024 10:08 AM CDT -----  Regarding: med refill  Can you please send a refill for patient's metoprolol succinate to [facility] in [place]?  She is out.  She has an appointment tomorrow for a 2 wk med change followup.    Thanks.

## 2024-04-16 ENCOUNTER — OFFICE VISIT (OUTPATIENT)
Dept: FAMILY MEDICINE | Facility: CLINIC | Age: 54
End: 2024-04-16
Payer: MEDICAID

## 2024-04-16 VITALS
DIASTOLIC BLOOD PRESSURE: 78 MMHG | SYSTOLIC BLOOD PRESSURE: 126 MMHG | HEIGHT: 65 IN | BODY MASS INDEX: 39.62 KG/M2 | HEART RATE: 78 BPM | OXYGEN SATURATION: 95 % | TEMPERATURE: 98 F | WEIGHT: 237.81 LBS

## 2024-04-16 DIAGNOSIS — B35.1 TOENAIL FUNGUS: ICD-10-CM

## 2024-04-16 DIAGNOSIS — I10 PRIMARY HYPERTENSION: Primary | ICD-10-CM

## 2024-04-16 DIAGNOSIS — L40.9 PSORIASIS: ICD-10-CM

## 2024-04-16 DIAGNOSIS — E11.9 TYPE 2 DIABETES MELLITUS WITHOUT COMPLICATION, WITHOUT LONG-TERM CURRENT USE OF INSULIN: ICD-10-CM

## 2024-04-16 PROCEDURE — 3078F DIAST BP <80 MM HG: CPT | Mod: CPTII,,, | Performed by: NURSE PRACTITIONER

## 2024-04-16 PROCEDURE — 3074F SYST BP LT 130 MM HG: CPT | Mod: CPTII,,, | Performed by: NURSE PRACTITIONER

## 2024-04-16 PROCEDURE — 99214 OFFICE O/P EST MOD 30 MIN: CPT | Mod: S$PBB,,, | Performed by: NURSE PRACTITIONER

## 2024-04-16 PROCEDURE — 1160F RVW MEDS BY RX/DR IN RCRD: CPT | Mod: CPTII,,, | Performed by: NURSE PRACTITIONER

## 2024-04-16 PROCEDURE — 3044F HG A1C LEVEL LT 7.0%: CPT | Mod: CPTII,,, | Performed by: NURSE PRACTITIONER

## 2024-04-16 PROCEDURE — 3008F BODY MASS INDEX DOCD: CPT | Mod: CPTII,,, | Performed by: NURSE PRACTITIONER

## 2024-04-16 PROCEDURE — 99999 PR PBB SHADOW E&M-EST. PATIENT-LVL V: CPT | Mod: PBBFAC,,, | Performed by: NURSE PRACTITIONER

## 2024-04-16 PROCEDURE — 1159F MED LIST DOCD IN RCRD: CPT | Mod: CPTII,,, | Performed by: NURSE PRACTITIONER

## 2024-04-16 PROCEDURE — 99215 OFFICE O/P EST HI 40 MIN: CPT | Mod: PBBFAC | Performed by: NURSE PRACTITIONER

## 2024-04-16 RX ORDER — INSULIN PUMP SYRINGE, 3 ML
EACH MISCELLANEOUS
Qty: 1 EACH | Refills: 0 | Status: SHIPPED | OUTPATIENT
Start: 2024-04-16

## 2024-04-16 RX ORDER — LANCETS
EACH MISCELLANEOUS
Qty: 120 EACH | Refills: 11 | Status: SHIPPED | OUTPATIENT
Start: 2024-04-16

## 2024-04-16 RX ORDER — TRAMADOL HYDROCHLORIDE 50 MG/1
50 TABLET ORAL 2 TIMES DAILY PRN
COMMUNITY
Start: 2024-04-09 | End: 2024-05-15 | Stop reason: ALTCHOICE

## 2024-04-16 NOTE — PROGRESS NOTES
Family Medicine    Patient ID: [id number]     Chief Complaint: Follow-up (Medication follow-up)      HPI:     Raquel yBrne is here today for medication f/u and lab review.    Past Medical History:   Diagnosis Date    Abnormal vaginal bleeding     Anxiety     Constipation     Hypertension     Medial meniscus tear     right knee    Ovarian cyst     Torn ACL     right knee        Past Surgical History:   Procedure Laterality Date    INCISION AND DRAINAGE Left     leg left    LAPAROSCOPIC CHOLECYSTECTOMY WITH CHOLANGIOGRAPHY N/A 12/28/2023    Procedure: CHOLECYSTECTOMY, LAPAROSCOPIC, WITH CHOLANGIOGRAM;  Surgeon: Prabhu Hernandes MD;  Location: [facility];  Service: General;  Laterality: N/A;    TUBAL LIGATION          Social History     Tobacco Use    Smoking status: Former     Types: Cigarettes    Smokeless tobacco: Never   Substance and Sexual Activity    Alcohol use: Never    Drug use: Yes     Types: Marijuana     Comment: Medical    Sexual activity: Not Currently        Current Outpatient Medications   Medication Instructions    blood sugar diagnostic Strp To check BG one times daily, to use with insurance preferred meter    blood-glucose meter kit To check BG one times daily, to use with insurance preferred meter    CAPLYTA 21 mg Cap 1 capsule, Oral, Nightly    clonazePAM (KLONOPIN) 1 mg, Oral, 2 times daily, as needed for anxiety.    FLUoxetine 20 mg, Oral, Every morning    gabapentin (NEURONTIN) 300 mg, Oral, 3 times daily    lancets Misc To check BG one times daily, to use with insurance preferred meter    metFORMIN (GLUCOPHAGE-XR) 500 mg, Oral, With breakfast    metoprolol succinate (TOPROL-XL) 25 mg, Oral, Daily    ondansetron (ZOFRAN-ODT) 8 mg, Oral, Every 6 hours PRN    traMADoL (ULTRAM) 50 mg, Oral, 2 times daily PRN       Review of patient's allergies indicates:   Allergen Reactions    Latex, natural rubber Hives, Itching and Swelling        Patient Care Team:  Renee Love FNP as PCP - General (Family  "Medicine)  Raji Gomes MD as Consulting Physician (Orthopedic Surgery)  Medicine, [facility] (Physical Therapy)  [facility] -  ErikaRadha benavidez FNP as Nurse Practitioner (Behavioral Health)     Subjective:     Review of Systems   Constitutional:  Negative for appetite change, chills, diaphoresis and fever.   HENT:  Negative for ear pain, sinus pain and sore throat.    Eyes:  Negative for pain and visual disturbance.   Respiratory:  Negative for cough, shortness of breath and wheezing.    Cardiovascular:  Negative for chest pain, palpitations and leg swelling.   Gastrointestinal:  Negative for abdominal pain, blood in stool, diarrhea, nausea and vomiting.   Endocrine: Negative for cold intolerance.   Genitourinary:  Negative for difficulty urinating, dysuria, frequency and hematuria.   Musculoskeletal:  Positive for gait problem (Right knee pain). Negative for arthralgias, joint swelling and myalgias.   Skin:  Negative for color change and rash.   Allergic/Immunologic: Negative.    Neurological:  Negative for dizziness, syncope, light-headedness and numbness.   Hematological: Negative.    Psychiatric/Behavioral:  Positive for behavioral problems (Anger issues). Negative for dysphoric mood and suicidal ideas. The patient is not nervous/anxious.    All other systems reviewed and are negative.      12 point review of systems conducted, negative except as stated in the history of present illness. See HPI for details.    Objective:     Visit Vitals  /78 (BP Location: Left arm, Patient Position: Sitting, BP Method: Large (Automatic))   Pulse 78   Temp 98.2 °F (36.8 °C) (Temporal)   Ht 5' 5" (1.651 m)   Wt 107.9 kg (237 lb 12.8 oz)   SpO2 95%   BMI 39.57 kg/m²       Physical Exam  Vitals and nursing note reviewed.   Constitutional:       General: She is not in acute distress.     Appearance: She is not ill-appearing.   HENT:      Head: Normocephalic and atraumatic.      " Mouth/Throat:      Mouth: Mucous membranes are moist.      Pharynx: Oropharynx is clear.   Eyes:      General: No scleral icterus.     Extraocular Movements: Extraocular movements intact.      Conjunctiva/sclera: Conjunctivae normal.      Pupils: Pupils are equal, round, and reactive to light.   Neck:      Vascular: No carotid bruit.   Cardiovascular:      Rate and Rhythm: Normal rate and regular rhythm.      Heart sounds: No murmur heard.     No friction rub. No gallop.   Pulmonary:      Effort: Pulmonary effort is normal. No respiratory distress.      Breath sounds: Normal breath sounds. No wheezing, rhonchi or rales.   Abdominal:      General: Abdomen is flat. Bowel sounds are normal. There is no distension.      Palpations: Abdomen is soft. There is no mass.      Tenderness: There is no abdominal tenderness.   Musculoskeletal:         General: Normal range of motion.      Cervical back: Normal range of motion and neck supple.      Right lower le+ Edema present.      Left lower le+ Edema present.        Legs:       Comments: As a brace on the right knee.  Seeing orthopedic.   Skin:     General: Skin is warm and dry.      Findings: Rash present. Rash is scaling and urticarial.          Neurological:      General: No focal deficit present.      Mental Status: She is alert.   Psychiatric:         Mood and Affect: Mood normal.         Labs Reviewed:   Labs reviewed with patient, verbalized understanding.  Chemistry:  Lab Results   Component Value Date     2024    K 4.0 2024    CHLORIDE 107 2024    BUN 13.0 2024    CREATININE 0.78 2024    EGFRNORACEVR >60 2024    GLUCOSE 258 (H) 2024    CALCIUM 9.6 2024    ALKPHOS 83 2024    LABPROT 8.5 (H) 2024    ALBUMIN 3.6 2024    AST 48 (H) 2024    ALT 52 2024    TSH 1.918 2024        Lab Results   Component Value Date    HGBA1C 6.9 2024        Hematology:  Lab Results    Component Value Date    WBC 3.40 (L) 03/28/2024    HGB 14.4 03/28/2024    HCT 43.0 03/28/2024     (L) 03/28/2024       Lipid Panel:  Lab Results   Component Value Date    CHOL 224 (H) 03/28/2024    HDL 60 03/28/2024    .00 (H) 03/28/2024    TRIG 115 03/28/2024    TOTALCHOLEST 4 03/28/2024        Urine:  Lab Results   Component Value Date    COLORUA Yellow 04/05/2023    APPEARANCEUA Clear 04/05/2023    SGUA >=1.030 04/05/2023    PHUA 5.5 04/05/2023    PROTEINUA 1+ (A) 04/05/2023    GLUCOSEUA Negative 04/05/2023    KETONESUA Negative 04/05/2023    BLOODUA Negative 04/05/2023    NITRITESUA Negative 04/05/2023    LEUKOCYTESUR Negative 04/05/2023    RBCUA 0-2 04/05/2023    WBCUA 0-2 04/05/2023    BACTERIA Rare 04/05/2023    CREATRANDUR 196.2 (H) 07/25/2023        Assessment:       ICD-10-CM ICD-9-CM   1. Primary hypertension  I10 401.9   2. Type 2 diabetes mellitus without complication, without long-term current use of insulin  E11.9 250.00   3. Toenail fungus  B35.1 110.1   4. Psoriasis  L40.9 696.1        Plan:     1. Primary hypertension  Overview:  On metoprolol, well controled.       2. Type 2 diabetes mellitus without complication, without long-term current use of insulin  Overview:  , notes has sweating and shaking episodes. Does not have a meter. Will order a meter for her.    Orders:  -     blood-glucose meter kit; To check BG one times daily, to use with insurance preferred meter  Dispense: 1 each; Refill: 0  -     lancets Misc; To check BG one times daily, to use with insurance preferred meter  Dispense: 120 each; Refill: 11  -     blood sugar diagnostic Strp; To check BG one times daily, to use with insurance preferred meter  Dispense: 120 each; Refill: 11    3. Toenail fungus  Overview:  Toe fungus right great, and 2nd toe, and 3rd toe.  Referral for podiatry.    Orders:  -     Ambulatory referral/consult to Podiatry; Future; Expected date: 04/23/2024    4. Psoriasis  Overview:  Rash  to the lower left extremity. plaque formation.           No follow-ups on file. In addition to their scheduled follow up, the patient has also been instructed to follow up on as needed basis.     Future Appointments   Date Time Provider Department Center   7/29/2024  9:00 AM Cox, Lori, FNP [facility]   3/31/2025  9:00 AM Cox, Lori, FNP [facility] Lori Cox, FNP

## 2024-05-09 ENCOUNTER — OFFICE VISIT (OUTPATIENT)
Dept: FAMILY MEDICINE | Facility: CLINIC | Age: 54
End: 2024-05-09
Payer: MEDICAID

## 2024-05-09 ENCOUNTER — HOSPITAL ENCOUNTER (OUTPATIENT)
Dept: RADIOLOGY | Facility: HOSPITAL | Age: 54
Discharge: HOME | End: 2024-05-09
Attending: NURSE PRACTITIONER
Payer: MEDICAID

## 2024-05-09 VITALS
HEART RATE: 60 BPM | RESPIRATION RATE: 20 BRPM | TEMPERATURE: 99 F | OXYGEN SATURATION: 97 % | SYSTOLIC BLOOD PRESSURE: 128 MMHG | HEIGHT: 65 IN | WEIGHT: 236 LBS | BODY MASS INDEX: 39.32 KG/M2 | DIASTOLIC BLOOD PRESSURE: 83 MMHG

## 2024-05-09 DIAGNOSIS — M25.511 CHRONIC RIGHT SHOULDER PAIN: ICD-10-CM

## 2024-05-09 DIAGNOSIS — M25.512 ACUTE PAIN OF LEFT SHOULDER: Primary | ICD-10-CM

## 2024-05-09 DIAGNOSIS — M62.838 MUSCLE SPASM: ICD-10-CM

## 2024-05-09 DIAGNOSIS — M54.50 LUMBAR PAIN: ICD-10-CM

## 2024-05-09 DIAGNOSIS — G89.29 CHRONIC RIGHT SHOULDER PAIN: ICD-10-CM

## 2024-05-09 PROCEDURE — 3008F BODY MASS INDEX DOCD: CPT | Mod: CPTII,,, | Performed by: NURSE PRACTITIONER

## 2024-05-09 PROCEDURE — 3044F HG A1C LEVEL LT 7.0%: CPT | Mod: CPTII,,, | Performed by: NURSE PRACTITIONER

## 2024-05-09 PROCEDURE — 73010 X-RAY EXAM OF SHOULDER BLADE: CPT | Mod: TC,LT

## 2024-05-09 PROCEDURE — 1160F RVW MEDS BY RX/DR IN RCRD: CPT | Mod: CPTII,,, | Performed by: NURSE PRACTITIONER

## 2024-05-09 PROCEDURE — 99214 OFFICE O/P EST MOD 30 MIN: CPT | Mod: S$PBB,,, | Performed by: NURSE PRACTITIONER

## 2024-05-09 PROCEDURE — 3079F DIAST BP 80-89 MM HG: CPT | Mod: CPTII,,, | Performed by: NURSE PRACTITIONER

## 2024-05-09 PROCEDURE — 99999 PR PBB SHADOW E&M-EST. PATIENT-LVL V: CPT | Mod: PBBFAC,,, | Performed by: NURSE PRACTITIONER

## 2024-05-09 PROCEDURE — 99215 OFFICE O/P EST HI 40 MIN: CPT | Mod: PBBFAC,25 | Performed by: NURSE PRACTITIONER

## 2024-05-09 PROCEDURE — 72100 X-RAY EXAM L-S SPINE 2/3 VWS: CPT | Mod: TC

## 2024-05-09 PROCEDURE — 1159F MED LIST DOCD IN RCRD: CPT | Mod: CPTII,,, | Performed by: NURSE PRACTITIONER

## 2024-05-09 PROCEDURE — 3074F SYST BP LT 130 MM HG: CPT | Mod: CPTII,,, | Performed by: NURSE PRACTITIONER

## 2024-05-09 NOTE — PROGRESS NOTES
Family Medicine    Patient ID: [id number]     Chief Complaint: Leg Pain      HPI:     Raquel Byrne is here today for complaint of pain to the left and right shoulder, and pain to the left buttock and gluteal area.  At this time patient denies bowel and bladder issues.    Past Medical History:   Diagnosis Date    Abnormal vaginal bleeding     Anxiety     Constipation     Hypertension     Medial meniscus tear     right knee    Ovarian cyst     Torn ACL     right knee        Past Surgical History:   Procedure Laterality Date    INCISION AND DRAINAGE Left     leg left    LAPAROSCOPIC CHOLECYSTECTOMY WITH CHOLANGIOGRAPHY N/A 12/28/2023    Procedure: CHOLECYSTECTOMY, LAPAROSCOPIC, WITH CHOLANGIOGRAM;  Surgeon: Prabhu Hernandes MD;  Location: [facility];  Service: General;  Laterality: N/A;    TUBAL LIGATION          Social History     Tobacco Use    Smoking status: Former     Types: Cigarettes    Smokeless tobacco: Never   Substance and Sexual Activity    Alcohol use: Never    Drug use: Yes     Types: Marijuana     Comment: Medical    Sexual activity: Not Currently        Current Outpatient Medications   Medication Instructions    blood sugar diagnostic Strp To check BG one times daily, to use with insurance preferred meter    blood-glucose meter kit To check BG one times daily, to use with insurance preferred meter    CAPLYTA 21 mg Cap 1 capsule, Oral, Nightly    clonazePAM (KLONOPIN) 1 mg, Oral, 2 times daily, as needed for anxiety.    FLUoxetine 20 mg, Oral, Every morning    gabapentin (NEURONTIN) 300 mg, Oral, 3 times daily    lancets Misc To check BG one times daily, to use with insurance preferred meter    metFORMIN (GLUCOPHAGE-XR) 500 mg, Oral, With breakfast    metoprolol succinate (TOPROL-XL) 25 mg, Oral, Daily    ondansetron (ZOFRAN-ODT) 8 mg, Oral, Every 6 hours PRN    traMADoL (ULTRAM) 50 mg, Oral, 2 times daily PRN       Review of patient's allergies indicates:   Allergen Reactions    Latex, natural rubber  "Hives, Itching and Swelling        Patient Care Team:  Renee Love FNP as PCP - General (Family Medicine)  Raji Gomes MD as Consulting Physician (Orthopedic Surgery)  Med[facility], [facility] (Physical Therapy)  [facility] -  Erika, CLEMENTE Tucker as Nurse Practitioner (Behavioral Health)     Subjective:     Review of Systems   Constitutional:  Negative for appetite change, chills, diaphoresis and fever.   HENT:  Negative for ear pain, sinus pain and sore throat.    Eyes:  Negative for pain and visual disturbance.   Respiratory:  Negative for cough, shortness of breath and wheezing.    Cardiovascular:  Negative for chest pain, palpitations and leg swelling.   Gastrointestinal:  Negative for abdominal pain, blood in stool, diarrhea, nausea and vomiting.   Endocrine: Negative for cold intolerance.   Genitourinary:  Negative for difficulty urinating, dysuria, frequency and hematuria.   Musculoskeletal:  Positive for joint swelling (left shoulder, posterior pain, right shoulder weakness) and myalgias (right hand weakness). Negative for arthralgias.   Skin:  Negative for color change and rash.   Allergic/Immunologic: Negative.    Neurological: Negative.  Negative for dizziness, syncope, light-headedness and numbness.   Hematological: Negative.    Psychiatric/Behavioral: Negative.  Negative for dysphoric mood and suicidal ideas. The patient is not nervous/anxious.    All other systems reviewed and are negative.      12 point review of systems conducted, negative except as stated in the history of present illness. See HPI for details.    Objective:     Visit Vitals  /83 (BP Location: Right arm, Patient Position: Sitting, BP Method: Large (Automatic))   Pulse 60   Temp 98.5 °F (36.9 °C)   Resp 20   Ht 5' 5" (1.651 m)   Wt 107 kg (236 lb)   SpO2 97%   BMI 39.27 kg/m²       Physical Exam  Vitals and nursing note reviewed.   Constitutional:       General: She is not in acute " distress.     Appearance: She is not ill-appearing.   HENT:      Head: Normocephalic and atraumatic.      Mouth/Throat:      Mouth: Mucous membranes are moist.      Pharynx: Oropharynx is clear.   Eyes:      General: No scleral icterus.     Extraocular Movements: Extraocular movements intact.      Conjunctiva/sclera: Conjunctivae normal.      Pupils: Pupils are equal, round, and reactive to light.   Neck:      Vascular: No carotid bruit.   Cardiovascular:      Rate and Rhythm: Normal rate and regular rhythm.      Heart sounds: No murmur heard.     No friction rub. No gallop.   Pulmonary:      Effort: Pulmonary effort is normal. No respiratory distress.      Breath sounds: Normal breath sounds. No wheezing, rhonchi or rales.   Abdominal:      General: Abdomen is flat. Bowel sounds are normal. There is no distension.      Palpations: Abdomen is soft. There is no mass.      Tenderness: There is no abdominal tenderness.   Musculoskeletal:         General: Normal range of motion.      Right shoulder: Tenderness (positive speed test) present.      Left shoulder: Tenderness (posterior with palp) present.      Cervical back: Normal range of motion and neck supple.   Skin:     General: Skin is warm and dry.   Neurological:      General: No focal deficit present.      Mental Status: She is alert.   Psychiatric:         Mood and Affect: Mood normal.         Labs Reviewed:   Labs reviewed with patient, verbalized understanding.  Chemistry:  Lab Results   Component Value Date     03/28/2024    K 4.0 03/28/2024    CHLORIDE 107 03/28/2024    BUN 13.0 03/28/2024    CREATININE 0.78 03/28/2024    EGFRNORACEVR >60 03/28/2024    GLUCOSE 258 (H) 03/28/2024    CALCIUM 9.6 03/28/2024    ALKPHOS 83 03/28/2024    LABPROT 8.5 (H) 03/28/2024    ALBUMIN 3.6 03/28/2024    AST 48 (H) 03/28/2024    ALT 52 03/28/2024    TSH 1.918 03/28/2024        Lab Results   Component Value Date    HGBA1C 6.9 03/28/2024        Hematology:  Lab Results    Component Value Date    WBC 3.40 (L) 03/28/2024    HGB 14.4 03/28/2024    HCT 43.0 03/28/2024     (L) 03/28/2024       Lipid Panel:  Lab Results   Component Value Date    CHOL 224 (H) 03/28/2024    HDL 60 03/28/2024    .00 (H) 03/28/2024    TRIG 115 03/28/2024    TOTALCHOLEST 4 03/28/2024        Urine:  Lab Results   Component Value Date    COLORUA Yellow 04/05/2023    APPEARANCEUA Clear 04/05/2023    SGUA >=1.030 04/05/2023    PHUA 5.5 04/05/2023    PROTEINUA 1+ (A) 04/05/2023    GLUCOSEUA Negative 04/05/2023    KETONESUA Negative 04/05/2023    BLOODUA Negative 04/05/2023    NITRITESUA Negative 04/05/2023    LEUKOCYTESUR Negative 04/05/2023    RBCUA 0-2 04/05/2023    WBCUA 0-2 04/05/2023    BACTERIA Rare 04/05/2023    CREATRANDUR 196.2 (H) 07/25/2023        Assessment:       ICD-10-CM ICD-9-CM   1. Acute pain of left shoulder  M25.512 719.41   2. Chronic right shoulder pain  M25.511 719.41    G89.29 338.29   3. Lumbar pain  M54.50 724.2   4. Muscle spasm  M62.838 728.85        Plan:     1. Acute pain of left shoulder  Overview:  Complaint of left shoulder pain, states a fall 6/2022.  Pain with palpation posterior.  X-ray ordered.      2. Chronic right shoulder pain  Overview:  Complaint of right shoulder pain/, states had a fall 6/2022, positive for speed's test.  Ice and heat as tolerated  X-rays ordered    Orders:  -     X-Ray Scapula Left; Future; Expected date: 05/09/2024    3. Lumbar pain  Overview:  Patient has pain to the left buttock and left gluteal fold with pain radiating into the thigh.  Advise ice to the area  X-rays of the lumbar area ordered.    Orders:  -     X-Ray Lumbar Spine AP And Lateral; Future; Expected date: 05/09/2024    4. Muscle spasm  Overview:  Having muscle spasms.  Started 2 to 3 weeks ago she says.  I will run a CBC and a magnesium level.    Orders:  -     Comprehensive Metabolic Panel; Future; Expected date: 05/09/2024  -     Magnesium; Future; Expected date:  05/09/2024         No follow-ups on file. In addition to their scheduled follow up, the patient has also been instructed to follow up on as needed basis.     Future Appointments   Date Time Provider Department Center   7/29/2024  9:00 AM Cox, Lori, FNP [facility]   3/31/2025  9:00 AM Cox, Lori, FNP [facility] Lori Cox, FNP

## 2024-05-10 DIAGNOSIS — M43.10 PARS DEFECT WITH SPONDYLOLISTHESIS: Primary | ICD-10-CM

## 2024-05-10 RX ORDER — TRAMADOL HYDROCHLORIDE 50 MG/1
50 TABLET ORAL EVERY 12 HOURS PRN
Qty: 8 TABLET | Refills: 0 | Status: SHIPPED | OUTPATIENT
Start: 2024-05-10 | End: 2024-05-15 | Stop reason: ALTCHOICE

## 2024-05-10 NOTE — PROGRESS NOTES
Patient is educated on the use of taking a benzodiazapine and opiate together. She understands the risks and realize that this is a CNS depressant.  I have advised her to not take the 2 together.  I have also advised her to heat ice alternating  She verbalized understanding of directions I have given her.

## 2024-05-15 ENCOUNTER — TELEPHONE (OUTPATIENT)
Dept: FAMILY MEDICINE | Facility: CLINIC | Age: 54
End: 2024-05-15
Payer: MEDICAID

## 2024-05-15 DIAGNOSIS — M62.838 MUSCLE SPASM: Primary | ICD-10-CM

## 2024-05-15 RX ORDER — CYCLOBENZAPRINE HCL 10 MG
10 TABLET ORAL 3 TIMES DAILY PRN
Qty: 45 TABLET | Refills: 0 | Status: SHIPPED | OUTPATIENT
Start: 2024-05-15

## 2024-05-15 NOTE — TELEPHONE ENCOUNTER
Patient called and said that her tramadol isn't working and requested to speak to Renee directly. Miss Holland informed her that a message would be sent.

## 2024-05-15 NOTE — PROGRESS NOTES
Low back muscle spasms, referral to neuro completed, stated ultram is ineffective, advised to not take the ultram with this medication,   RTC in 1-2 weeks for f/u if not improved.

## 2024-05-20 DIAGNOSIS — R45.86 REBOUND MOOD SWINGS: ICD-10-CM

## 2024-05-20 DIAGNOSIS — F41.9 ANXIETY: ICD-10-CM

## 2024-05-20 RX ORDER — CLONAZEPAM 1 MG/1
1 TABLET ORAL 2 TIMES DAILY
Qty: 60 TABLET | Refills: 0 | Status: SHIPPED | OUTPATIENT
Start: 2024-05-20

## 2024-05-20 RX ORDER — FLUOXETINE HYDROCHLORIDE 20 MG/1
20 CAPSULE ORAL EVERY MORNING
Qty: 30 CAPSULE | Refills: 0 | Status: SHIPPED | OUTPATIENT
Start: 2024-05-20

## 2024-05-20 NOTE — TELEPHONE ENCOUNTER
----- Message from Amalia Villarreal sent at 5/20/2024  1:37 PM CDT -----  Regarding: Med refills  Patient is requesting a refill of fluoxetine and clonazepam to [facility] in [place].

## 2024-05-30 ENCOUNTER — TELEPHONE (OUTPATIENT)
Dept: DIABETES | Facility: CLINIC | Age: 54
End: 2024-05-30
Payer: MEDICAID

## 2024-05-30 ENCOUNTER — OFFICE VISIT (OUTPATIENT)
Dept: FAMILY MEDICINE | Facility: CLINIC | Age: 54
End: 2024-05-30
Payer: MEDICAID

## 2024-05-30 VITALS
HEART RATE: 84 BPM | BODY MASS INDEX: 39.3 KG/M2 | HEIGHT: 65 IN | RESPIRATION RATE: 16 BRPM | TEMPERATURE: 96 F | OXYGEN SATURATION: 98 % | WEIGHT: 235.88 LBS | DIASTOLIC BLOOD PRESSURE: 84 MMHG | SYSTOLIC BLOOD PRESSURE: 132 MMHG

## 2024-05-30 DIAGNOSIS — E11.9 TYPE 2 DIABETES MELLITUS WITHOUT COMPLICATION, WITHOUT LONG-TERM CURRENT USE OF INSULIN: Primary | ICD-10-CM

## 2024-05-30 DIAGNOSIS — L03.012 PARONYCHIA OF FINGER OF LEFT HAND: ICD-10-CM

## 2024-05-30 DIAGNOSIS — B37.2 SKIN CANDIDIASIS: ICD-10-CM

## 2024-05-30 DIAGNOSIS — E78.00 HYPERCHOLESTEREMIA: ICD-10-CM

## 2024-05-30 DIAGNOSIS — E66.9 OBESITY (BMI 35.0-39.9 WITHOUT COMORBIDITY): ICD-10-CM

## 2024-05-30 PROCEDURE — 1159F MED LIST DOCD IN RCRD: CPT | Mod: CPTII,,, | Performed by: NURSE PRACTITIONER

## 2024-05-30 PROCEDURE — 1160F RVW MEDS BY RX/DR IN RCRD: CPT | Mod: CPTII,,, | Performed by: NURSE PRACTITIONER

## 2024-05-30 PROCEDURE — 3079F DIAST BP 80-89 MM HG: CPT | Mod: CPTII,,, | Performed by: NURSE PRACTITIONER

## 2024-05-30 PROCEDURE — 99999 PR PBB SHADOW E&M-EST. PATIENT-LVL IV: CPT | Mod: PBBFAC,,, | Performed by: NURSE PRACTITIONER

## 2024-05-30 PROCEDURE — 96372 THER/PROPH/DIAG INJ SC/IM: CPT | Mod: PBBFAC

## 2024-05-30 PROCEDURE — 87070 CULTURE OTHR SPECIMN AEROBIC: CPT | Performed by: NURSE PRACTITIONER

## 2024-05-30 PROCEDURE — 99999PBSHW PR PBB SHADOW TECHNICAL ONLY FILED TO HB: Mod: PBBFAC,,,

## 2024-05-30 PROCEDURE — 99214 OFFICE O/P EST MOD 30 MIN: CPT | Mod: PBBFAC,25 | Performed by: NURSE PRACTITIONER

## 2024-05-30 PROCEDURE — 3044F HG A1C LEVEL LT 7.0%: CPT | Mod: CPTII,,, | Performed by: NURSE PRACTITIONER

## 2024-05-30 PROCEDURE — 99215 OFFICE O/P EST HI 40 MIN: CPT | Mod: S$PBB,,, | Performed by: NURSE PRACTITIONER

## 2024-05-30 PROCEDURE — 3008F BODY MASS INDEX DOCD: CPT | Mod: CPTII,,, | Performed by: NURSE PRACTITIONER

## 2024-05-30 PROCEDURE — 3075F SYST BP GE 130 - 139MM HG: CPT | Mod: CPTII,,, | Performed by: NURSE PRACTITIONER

## 2024-05-30 RX ORDER — CLOTRIMAZOLE 1 %
CREAM (GRAM) TOPICAL 2 TIMES DAILY
Qty: 28 G | Refills: 1 | Status: SHIPPED | OUTPATIENT
Start: 2024-05-30

## 2024-05-30 RX ORDER — CEFTRIAXONE 1 G/1
1 INJECTION, POWDER, FOR SOLUTION INTRAMUSCULAR; INTRAVENOUS
Status: COMPLETED | OUTPATIENT
Start: 2024-05-30 | End: 2024-05-30

## 2024-05-30 RX ORDER — FLUCONAZOLE 150 MG/1
150 TABLET ORAL DAILY
Qty: 1 TABLET | Refills: 0 | Status: SHIPPED | OUTPATIENT
Start: 2024-05-30 | End: 2024-05-31

## 2024-05-30 RX ORDER — ATORVASTATIN CALCIUM 40 MG/1
40 TABLET, FILM COATED ORAL NIGHTLY
Qty: 90 TABLET | Refills: 3 | Status: SHIPPED | OUTPATIENT
Start: 2024-05-30

## 2024-05-30 RX ORDER — CEPHALEXIN 500 MG/1
500 CAPSULE ORAL EVERY 12 HOURS
Qty: 10 CAPSULE | Refills: 0 | Status: SHIPPED | OUTPATIENT
Start: 2024-05-30

## 2024-05-30 RX ORDER — MUPIROCIN 20 MG/G
OINTMENT TOPICAL 3 TIMES DAILY
Qty: 22 G | Refills: 1 | Status: SHIPPED | OUTPATIENT
Start: 2024-05-30

## 2024-05-30 RX ORDER — ATORVASTATIN CALCIUM 20 MG/1
20 TABLET, FILM COATED ORAL DAILY
Qty: 90 TABLET | Refills: 3 | Status: SHIPPED | OUTPATIENT
Start: 2024-05-30 | End: 2024-05-30

## 2024-05-30 RX ADMIN — CEFTRIAXONE SODIUM 1 G: 1 INJECTION, POWDER, FOR SOLUTION INTRAMUSCULAR; INTRAVENOUS at 01:05

## 2024-05-30 NOTE — PROGRESS NOTES
Family Medicine    Patient ID: [id number]     Chief Complaint: Hand Pain (L index finger redness, swelling, and pain x 1 week ago s/p biting fingernail/Went crabbing two days later )      HPI:     Raquel Byrne is here today for  pain and swelling to the left  2nd finger at the nail, patient states that she bites her nails.  After biting her nails she states that she did go into the goal and was crabbing.  Since then she has noticed pain and swelling to the area.  Last night she did soak her nail in Epsom salt and has had some relief.  Today she is here for an assessment of the finger.  I was able to get the culture.  Today we will  treat with oral antibiotics and mupirocin ointment.    Past Medical History:   Diagnosis Date    Abnormal vaginal bleeding     Anxiety     Constipation     Hypertension     Medial meniscus tear     right knee    Ovarian cyst     Torn ACL     right knee        Past Surgical History:   Procedure Laterality Date    INCISION AND DRAINAGE Left     leg left    LAPAROSCOPIC CHOLECYSTECTOMY WITH CHOLANGIOGRAPHY N/A 12/28/2023    Procedure: CHOLECYSTECTOMY, LAPAROSCOPIC, WITH CHOLANGIOGRAM;  Surgeon: Prabhu Hernandes MD;  Location: [facility];  Service: General;  Laterality: N/A;    TUBAL LIGATION          Social History     Tobacco Use    Smoking status: Former     Current packs/day: 0.00     Average packs/day: 0.3 packs/day for 23.0 years (5.8 ttl pk-yrs)     Types: Cigarettes     Start date: 1991     Quit date: 2014     Years since quitting: 10.4    Smokeless tobacco: Never   Substance and Sexual Activity    Alcohol use: Never    Drug use: Yes     Types: Marijuana     Comment: Medical    Sexual activity: Not Currently        Current Outpatient Medications   Medication Instructions    blood sugar diagnostic Strp To check BG one times daily, to use with insurance preferred meter    blood-glucose meter kit To check BG one times daily, to use with insurance preferred meter    CAPLYTA 21 mg Cap 1  capsule, Oral, Nightly    cephALEXin (KEFLEX) 500 mg, Oral, Every 12 hours    clonazePAM (KLONOPIN) 1 mg, Oral, 2 times daily, as needed for anxiety.    clotrimazole (LOTRIMIN) 1 % cream Topical (Top), 2 times daily    cyclobenzaprine (FLEXERIL) 10 mg, Oral, 3 times daily PRN    fluconazole (DIFLUCAN) 150 mg, Oral, Daily    FLUoxetine 20 mg, Oral, Every morning    gabapentin (NEURONTIN) 300 mg, 3 times daily    lancets Misc To check BG one times daily, to use with insurance preferred meter    metFORMIN (GLUCOPHAGE-XR) 500 mg, Oral, With breakfast    metoprolol succinate (TOPROL-XL) 25 mg, Oral, Daily    mupirocin (BACTROBAN) 2 % ointment Topical (Top), 3 times daily    ondansetron (ZOFRAN-ODT) 8 mg, Oral, Every 6 hours PRN       Review of patient's allergies indicates:   Allergen Reactions    Latex, natural rubber Hives, Itching and Swelling        Patient Care Team:  Renee Love FNP as PCP - General (Family Medicine)  [facility], Raji BLACK MD as Consulting Physician (Orthopedic Surgery)  [facility] (Physical Therapy)  [facility]ide[facility] FNP as Nurse Practitioner (Behavioral Health)     Subjective:     Review of Systems   Constitutional:  Negative for appetite change, chills, diaphoresis and fever.   HENT:  Negative for ear pain, sinus pain and sore throat.    Eyes:  Negative for pain and visual disturbance.   Respiratory:  Negative for cough, shortness of breath and wheezing.    Cardiovascular:  Negative for chest pain, palpitations and leg swelling.   Gastrointestinal:  Negative for abdominal pain, blood in stool, diarrhea, nausea and vomiting.   Endocrine: Negative for cold intolerance.   Genitourinary:  Negative for difficulty urinating, dysuria, frequency and hematuria.   Musculoskeletal:  Negative for arthralgias and myalgias.   Skin:  Positive for wound (Left index finger, states biting her cuticle then going crabbing.  She states that now her finger  "is swelling at the tip.). Negative for color change and rash.   Allergic/Immunologic: Negative.    Neurological: Negative.  Negative for dizziness, syncope, light-headedness and numbness.   Hematological: Negative.    Psychiatric/Behavioral: Negative.  Negative for dysphoric mood and suicidal ideas. The patient is not nervous/anxious.    All other systems reviewed and are negative.      12 point review of systems conducted, negative except as stated in the history of present illness. See HPI for details.    Objective:     Visit Vitals  /84 (BP Location: Right arm, Patient Position: Sitting, BP Method: X-Large (Automatic))   Pulse 84   Temp 96 °F (35.6 °C) (Temporal)   Resp 16   Ht 5' 5" (1.651 m)   Wt 107 kg (235 lb 14.3 oz)   SpO2 98%   BMI 39.25 kg/m²       Physical Exam  Vitals and nursing note reviewed.   Constitutional:       General: She is not in acute distress.     Appearance: She is not ill-appearing.   HENT:      Head: Normocephalic and atraumatic.      Mouth/Throat:      Mouth: Mucous membranes are moist.      Pharynx: Oropharynx is clear.   Eyes:      General: No scleral icterus.     Extraocular Movements: Extraocular movements intact.      Conjunctiva/sclera: Conjunctivae normal.      Pupils: Pupils are equal, round, and reactive to light.   Neck:      Vascular: No carotid bruit.   Cardiovascular:      Rate and Rhythm: Normal rate and regular rhythm.      Pulses:           Dorsalis pedis pulses are 2+ on the right side and 2+ on the left side.        Posterior tibial pulses are 2+ on the right side and 2+ on the left side.      Heart sounds: No murmur heard.     No friction rub. No gallop.   Pulmonary:      Effort: Pulmonary effort is normal. No respiratory distress.      Breath sounds: Normal breath sounds. No wheezing, rhonchi or rales.   Abdominal:      General: Abdomen is flat. Bowel sounds are normal. There is no distension.      Palpations: Abdomen is soft. There is no mass.      Tenderness: " There is no abdominal tenderness.   Musculoskeletal:         General: Swelling (Left 2nd finger, DIP,  swelling redness) present. Normal range of motion.      Cervical back: Normal range of motion and neck supple.      Right foot: No deformity.      Left foot: No deformity.   Feet:      Right foot:      Protective Sensation: 5 sites tested.  5 sites sensed.      Skin integrity: Skin integrity normal. No ulcer, blister, skin breakdown, erythema, warmth, callus, dry skin or fissure.      Toenail Condition: Fungal disease present.     Left foot:      Protective Sensation: 5 sites tested.  5 sites sensed.      Skin integrity: Skin integrity normal. No ulcer, blister, skin breakdown, erythema, warmth, callus, dry skin or fissure.      Toenail Condition: Fungal disease present.  Skin:     General: Skin is warm and dry.   Neurological:      General: No focal deficit present.      Mental Status: She is alert.   Psychiatric:         Mood and Affect: Mood normal.       Labs Reviewed:   Labs reviewed with patient, verbalized understanding.  Chemistry:  Lab Results   Component Value Date     05/09/2024    K 4.2 05/09/2024    CHLORIDE 107 05/09/2024    BUN 15.0 05/09/2024    CREATININE 0.80 05/09/2024    EGFRNORACEVR >60 05/09/2024    GLUCOSE 103 (H) 05/09/2024    CALCIUM 9.5 05/09/2024    ALKPHOS 78 05/09/2024    LABPROT 8.6 (H) 05/09/2024    ALBUMIN 3.8 05/09/2024    AST 40 (H) 05/09/2024    ALT 42 05/09/2024    MG 2.00 05/09/2024    TSH 1.918 03/28/2024        Lab Results   Component Value Date    HGBA1C 6.9 03/28/2024        Hematology:  Lab Results   Component Value Date    WBC 3.40 (L) 03/28/2024    HGB 14.4 03/28/2024    HCT 43.0 03/28/2024     (L) 03/28/2024       Lipid Panel:  Lab Results   Component Value Date    CHOL 224 (H) 03/28/2024    HDL 60 03/28/2024    .00 (H) 03/28/2024    TRIG 115 03/28/2024    TOTALCHOLEST 4 03/28/2024        Urine:  Lab Results   Component Value Date    COLORUA Yellow  04/05/2023    APPEARANCEUA Clear 04/05/2023    SGUA >=1.030 04/05/2023    PHUA 5.5 04/05/2023    PROTEINUA 1+ (A) 04/05/2023    GLUCOSEUA Negative 04/05/2023    KETONESUA Negative 04/05/2023    BLOODUA Negative 04/05/2023    NITRITESUA Negative 04/05/2023    LEUKOCYTESUR Negative 04/05/2023    RBCUA 0-2 04/05/2023    WBCUA 0-2 04/05/2023    BACTERIA Rare 04/05/2023    CREATRANDUR 196.2 (H) 07/25/2023        Assessment:       ICD-10-CM ICD-9-CM   1. Type 2 diabetes mellitus without complication, without long-term current use of insulin  E11.9 250.00   2. Obesity (BMI 35.0-39.9 without comorbidity)  E66.9 278.00   3. Paronychia of finger of left hand  L03.012 681.02   4. Skin candidiasis  B37.2 112.3        Plan:     1. Type 2 diabetes mellitus without complication, without long-term current use of insulin  Overview:  .   States her blood sugars is improving.  Has had 1 instance where her blood sugar was 220.  Otherwise her blood sugar has improved.  I have ordered diabetic education for her.    Orders:  -     Ambulatory referral/consult to Diabetes Education; Future; Expected date: 06/06/2024    2. Obesity (BMI 35.0-39.9 without comorbidity)  Overview:    Diabetic education is ordered for her.      3. Paronychia of finger of left hand  Overview:   Soak in warm water and Epsom salt at least 2 to 3 times a day.  Apply antibiotic ointment and apply a Band-Aid.  Return to clinic if no improvement in 2-3 days.     Orders:  -     mupirocin (BACTROBAN) 2 % ointment; Apply topically 3 (three) times daily.  Dispense: 22 g; Refill: 1  -     cephALEXin (KEFLEX) 500 MG capsule; Take 1 capsule (500 mg total) by mouth every 12 (twelve) hours.  Dispense: 10 capsule; Refill: 0  -     Wound Culture  -     cefTRIAXone injection 1 g    4. Skin candidiasis  Overview:  Avoid tight fighting clothing, Thoroughly dry the skin  after washing, showering and prior to putting on your clothes.  May apply  a thin film antifungal lotion  prior to dressing.     Orders:  -     clotrimazole (LOTRIMIN) 1 % cream; Apply topically 2 (two) times daily.  Dispense: 28 g; Refill: 1  -     fluconazole (DIFLUCAN) 150 MG Tab; Take 1 tablet (150 mg total) by mouth once daily. for 1 day  Dispense: 1 tablet; Refill: 0         No follow-ups on file. In addition to their scheduled follow up, the patient has also been instructed to follow up on as needed basis.     Future Appointments   Date Time Provider Department Center   7/29/2024  9:00 AM Cox, Lori, FNP [facility]   3/31/2025  9:00 AM Cox, Lori, FNP [facility] Lori Cox, FNP

## 2024-06-02 LAB
BACTERIA WND CULT: ABNORMAL
BACTERIA WND CULT: ABNORMAL

## 2024-06-03 ENCOUNTER — PATIENT MESSAGE (OUTPATIENT)
Dept: FAMILY MEDICINE | Facility: CLINIC | Age: 54
End: 2024-06-03
Payer: MEDICAID

## 2024-06-25 DIAGNOSIS — M54.9 BACK PAIN, UNSPECIFIED BACK LOCATION, UNSPECIFIED BACK PAIN LATERALITY, UNSPECIFIED CHRONICITY: Primary | ICD-10-CM

## 2024-06-28 DIAGNOSIS — R45.86 REBOUND MOOD SWINGS: ICD-10-CM

## 2024-06-28 DIAGNOSIS — F41.9 ANXIETY: ICD-10-CM

## 2024-06-28 RX ORDER — FLUOXETINE HYDROCHLORIDE 20 MG/1
20 CAPSULE ORAL EVERY MORNING
Qty: 30 CAPSULE | Refills: 0 | Status: SHIPPED | OUTPATIENT
Start: 2024-06-28

## 2024-06-28 RX ORDER — CLONAZEPAM 1 MG/1
1 TABLET ORAL 2 TIMES DAILY
Qty: 60 TABLET | Refills: 0 | Status: SHIPPED | OUTPATIENT
Start: 2024-06-28

## 2024-07-01 ENCOUNTER — HOSPITAL ENCOUNTER (OUTPATIENT)
Dept: RADIOLOGY | Facility: HOSPITAL | Age: 54
Discharge: HOME | End: 2024-07-01
Attending: NURSE PRACTITIONER
Payer: MEDICAID

## 2024-07-01 ENCOUNTER — OFFICE VISIT (OUTPATIENT)
Dept: FAMILY MEDICINE | Facility: CLINIC | Age: 54
End: 2024-07-01
Payer: MEDICAID

## 2024-07-01 VITALS
SYSTOLIC BLOOD PRESSURE: 136 MMHG | HEART RATE: 84 BPM | RESPIRATION RATE: 18 BRPM | OXYGEN SATURATION: 98 % | HEIGHT: 65 IN | TEMPERATURE: 98 F | BODY MASS INDEX: 39.15 KG/M2 | DIASTOLIC BLOOD PRESSURE: 86 MMHG | WEIGHT: 235 LBS

## 2024-07-01 DIAGNOSIS — Z12.4 PAPANICOLAOU SMEAR FOR CERVICAL CANCER SCREENING: ICD-10-CM

## 2024-07-01 DIAGNOSIS — Z12.39 ENCOUNTER FOR SCREENING FOR MALIGNANT NEOPLASM OF BREAST, UNSPECIFIED SCREENING MODALITY: ICD-10-CM

## 2024-07-01 DIAGNOSIS — G89.29 CHRONIC LEFT-SIDED LOW BACK PAIN WITH LEFT-SIDED SCIATICA: ICD-10-CM

## 2024-07-01 DIAGNOSIS — E11.9 TYPE 2 DIABETES MELLITUS WITHOUT COMPLICATION, WITHOUT LONG-TERM CURRENT USE OF INSULIN: Primary | ICD-10-CM

## 2024-07-01 DIAGNOSIS — G56.92: ICD-10-CM

## 2024-07-01 DIAGNOSIS — M54.42 CHRONIC LEFT-SIDED LOW BACK PAIN WITH LEFT-SIDED SCIATICA: ICD-10-CM

## 2024-07-01 DIAGNOSIS — M25.512 LEFT SHOULDER PAIN, UNSPECIFIED CHRONICITY: ICD-10-CM

## 2024-07-01 DIAGNOSIS — Z96.619 PRESENCE OF ARTIFICIAL SHOULDER JOINT, UNSPECIFIED LATERALITY: ICD-10-CM

## 2024-07-01 LAB
CREAT UR-MCNC: 64.7 MG/DL (ref 45–106)
MICROALBUMIN UR-MCNC: 15.5 UG/ML
MICROALBUMIN/CREAT RATIO PNL UR: 24 MG/GM CR (ref 0–30)

## 2024-07-01 PROCEDURE — 3066F NEPHROPATHY DOC TX: CPT | Mod: CPTII,,, | Performed by: NURSE PRACTITIONER

## 2024-07-01 PROCEDURE — 1159F MED LIST DOCD IN RCRD: CPT | Mod: CPTII,,, | Performed by: NURSE PRACTITIONER

## 2024-07-01 PROCEDURE — 3051F HG A1C>EQUAL 7.0%<8.0%: CPT | Mod: CPTII,,, | Performed by: NURSE PRACTITIONER

## 2024-07-01 PROCEDURE — 99214 OFFICE O/P EST MOD 30 MIN: CPT | Mod: S$PBB,,, | Performed by: NURSE PRACTITIONER

## 2024-07-01 PROCEDURE — 82043 UR ALBUMIN QUANTITATIVE: CPT | Performed by: NURSE PRACTITIONER

## 2024-07-01 PROCEDURE — 99999 PR PBB SHADOW E&M-EST. PATIENT-LVL V: CPT | Mod: PBBFAC,,, | Performed by: NURSE PRACTITIONER

## 2024-07-01 PROCEDURE — 1160F RVW MEDS BY RX/DR IN RCRD: CPT | Mod: CPTII,,, | Performed by: NURSE PRACTITIONER

## 2024-07-01 PROCEDURE — 3075F SYST BP GE 130 - 139MM HG: CPT | Mod: CPTII,,, | Performed by: NURSE PRACTITIONER

## 2024-07-01 PROCEDURE — 3079F DIAST BP 80-89 MM HG: CPT | Mod: CPTII,,, | Performed by: NURSE PRACTITIONER

## 2024-07-01 PROCEDURE — 99215 OFFICE O/P EST HI 40 MIN: CPT | Mod: PBBFAC,25 | Performed by: NURSE PRACTITIONER

## 2024-07-01 PROCEDURE — 3008F BODY MASS INDEX DOCD: CPT | Mod: CPTII,,, | Performed by: NURSE PRACTITIONER

## 2024-07-01 PROCEDURE — 3061F NEG MICROALBUMINURIA REV: CPT | Mod: CPTII,,, | Performed by: NURSE PRACTITIONER

## 2024-07-01 PROCEDURE — 82570 ASSAY OF URINE CREATININE: CPT | Performed by: NURSE PRACTITIONER

## 2024-07-01 PROCEDURE — 73010 X-RAY EXAM OF SHOULDER BLADE: CPT | Mod: TC,LT

## 2024-07-01 RX ORDER — TIRZEPATIDE 2.5 MG/.5ML
2.5 INJECTION, SOLUTION SUBCUTANEOUS
Qty: 2 ML | Refills: 1 | Status: SHIPPED | OUTPATIENT
Start: 2024-07-01

## 2024-07-01 RX ORDER — METFORMIN HYDROCHLORIDE 500 MG/1
500 TABLET, EXTENDED RELEASE ORAL 2 TIMES DAILY WITH MEALS
Qty: 180 TABLET | Refills: 3 | Status: SHIPPED | OUTPATIENT
Start: 2024-07-01 | End: 2025-07-01

## 2024-07-01 NOTE — PROGRESS NOTES
For left shoulder pain that she states  Family Medicine    Patient ID: [id number]     Chief Complaint: Shoulder Pain (L shoulder (nagging) pain that radiates down L arm/Worse with movement 6/10/)      HPI:     Raquel Byrne is here today for left shoulder pain that she does state has happened after she fell couple of months ago.  She states that she did hurt her right shoulder when she fell and did get an x-ray on it but was negative.  Today she does note the is generated from her shoulder it does go down into hand when the shoulders manipulated it does feel burning in the palm of hand.  Patient is also due for a mammogram.  She is also due for a cervical Pap.  She does have high risk for HPV.  Patient will need a referral for gyn in for the high-risk HPV.  She does note that she is having pink discharge from the vaginal area.  She has not had a cycle in over a year.  She does note her last cycle was in March of last year.  It has been 3 months since her last A1c, I will repeat her A1c.  I will also do urine microalbumin.    Past Medical History:   Diagnosis Date    Abnormal vaginal bleeding     Anxiety     Constipation     Hypertension     Medial meniscus tear     right knee    Ovarian cyst     Torn ACL     right knee        Past Surgical History:   Procedure Laterality Date    INCISION AND DRAINAGE Left     leg left    LAPAROSCOPIC CHOLECYSTECTOMY WITH CHOLANGIOGRAPHY N/A 12/28/2023    Procedure: CHOLECYSTECTOMY, LAPAROSCOPIC, WITH CHOLANGIOGRAM;  Surgeon: Prabhu Hernandes MD;  Location: [facility];  Service: General;  Laterality: N/A;    TUBAL LIGATION          Social History     Tobacco Use    Smoking status: Former     Current packs/day: 0.00     Average packs/day: 0.3 packs/day for 23.0 years (5.8 ttl pk-yrs)     Types: Cigarettes     Start date: 1991     Quit date: 2014     Years since quitting: 10.5    Smokeless tobacco: Never   Substance and Sexual Activity    Alcohol use: Never    Drug use: Yes     Types:  Marijuana     Comment: Medical    Sexual activity: Not Currently        Current Outpatient Medications   Medication Instructions    atorvastatin (LIPITOR) 40 mg, Oral, Nightly    blood sugar diagnostic Strp To check BG one times daily, to use with insurance preferred meter    blood-glucose meter kit To check BG one times daily, to use with insurance preferred meter    CAPLYTA 21 mg Cap 1 capsule, Nightly    cephALEXin (KEFLEX) 500 mg, Oral, Every 12 hours    clonazePAM (KLONOPIN) 1 mg, Oral, 2 times daily, as needed for anxiety.    clotrimazole (LOTRIMIN) 1 % cream Topical (Top), 2 times daily    cyclobenzaprine (FLEXERIL) 10 mg, Oral, 3 times daily PRN    FLUoxetine 20 mg, Oral, Every morning    lancets Misc To check BG one times daily, to use with insurance preferred meter    metFORMIN (GLUCOPHAGE-XR) 500 mg, Oral, With breakfast    metoprolol succinate (TOPROL-XL) 25 mg, Oral, Daily    mupirocin (BACTROBAN) 2 % ointment Topical (Top), 3 times daily    ondansetron (ZOFRAN-ODT) 8 mg, Oral, Every 6 hours PRN       Review of patient's allergies indicates:   Allergen Reactions    Latex, natural rubber Hives, Itching and Swelling        Patient Care Team:  Renee Love FNP as PCP - General (Family Medicine)  Raji Gomes MD as Consulting Physician (Orthopedic Surgery)  [facility] (Physical Therapy)  [facility] F[facility] as Nurse Practitioner (Behavioral Health)     Subjective:     Review of Systems   Constitutional:  Negative for appetite change, chills, diaphoresis and fever.   HENT:  Negative for ear pain, sinus pain and sore throat.    Eyes:  Negative for pain and visual disturbance.   Respiratory:  Negative for cough, shortness of breath and wheezing.    Cardiovascular:  Negative for chest pain, palpitations and leg swelling.   Gastrointestinal:  Negative for abdominal pain, blood in stool, diarrhea, nausea and vomiting.   Endocrine: Negative for  "cold intolerance.   Genitourinary:  Negative for difficulty urinating, dysuria, frequency and hematuria.   Musculoskeletal:  Positive for back pain (left buttock, right low back,). Negative for arthralgias, joint swelling and myalgias (lef shoulder pain with pain after using the shoulder).   Skin:  Negative for color change and rash.   Allergic/Immunologic: Negative.    Neurological: Negative.  Negative for dizziness, syncope, light-headedness and numbness.   Hematological: Negative.    Psychiatric/Behavioral: Negative.  Negative for dysphoric mood and suicidal ideas. The patient is not nervous/anxious.    All other systems reviewed and are negative.      12 point review of systems conducted, negative except as stated in the history of present illness. See HPI for details.    Objective:     Visit Vitals  /86 (BP Location: Right arm, Patient Position: Sitting, BP Method: X-Large (Automatic))   Pulse 84   Temp 98 °F (36.7 °C) (Temporal)   Resp 18   Ht 5' 5" (1.651 m)   Wt 106.6 kg (235 lb)   SpO2 98%   BMI 39.11 kg/m²       Physical Exam  Vitals and nursing note reviewed.   Constitutional:       General: She is not in acute distress.     Appearance: She is not ill-appearing.   HENT:      Head: Normocephalic and atraumatic.      Mouth/Throat:      Mouth: Mucous membranes are moist.      Pharynx: Oropharynx is clear.   Eyes:      General: No scleral icterus.     Extraocular Movements: Extraocular movements intact.      Conjunctiva/sclera: Conjunctivae normal.      Pupils: Pupils are equal, round, and reactive to light.   Neck:      Vascular: No carotid bruit.   Cardiovascular:      Rate and Rhythm: Normal rate and regular rhythm.      Heart sounds: No murmur heard.     No friction rub. No gallop.   Pulmonary:      Effort: Pulmonary effort is normal. No respiratory distress.      Breath sounds: Normal breath sounds. No wheezing, rhonchi or rales.   Abdominal:      General: Abdomen is flat. Bowel sounds are normal. " There is no distension.      Palpations: Abdomen is soft. There is no mass.      Tenderness: There is no abdominal tenderness.   Musculoskeletal:         General: Normal range of motion.        Arms:       Cervical back: Normal range of motion and neck supple.      Comments: Fell, hurt the right, but the left was hurting worse, xrays were done and were negative.    Skin:     General: Skin is warm and dry.   Neurological:      General: No focal deficit present.      Mental Status: She is alert.      Comments: Burning to the left palm with empty can.   Psychiatric:         Mood and Affect: Mood normal.         Labs Reviewed:   Labs reviewed with patient, verbalized understanding.  Chemistry:  Lab Results   Component Value Date     05/09/2024    K 4.2 05/09/2024    BUN 15.0 05/09/2024    CREATININE 0.80 05/09/2024    EGFRNORACEVR >60 05/09/2024    GLUCOSE 103 (H) 05/09/2024    CALCIUM 9.5 05/09/2024    ALKPHOS 78 05/09/2024    LABPROT 8.6 (H) 05/09/2024    ALBUMIN 3.8 05/09/2024    AST 40 (H) 05/09/2024    ALT 42 05/09/2024    MG 2.00 05/09/2024    TSH 1.918 03/28/2024        Lab Results   Component Value Date    HGBA1C 6.9 03/28/2024        Hematology:  Lab Results   Component Value Date    WBC 3.40 (L) 03/28/2024    HGB 14.4 03/28/2024    HCT 43.0 03/28/2024     (L) 03/28/2024       Lipid Panel:  Lab Results   Component Value Date    CHOL 224 (H) 03/28/2024    HDL 60 03/28/2024    .00 (H) 03/28/2024    TRIG 115 03/28/2024    TOTALCHOLEST 4 03/28/2024        Urine:  Lab Results   Component Value Date    APPEARANCEUA Clear 04/05/2023    SGUA >=1.030 04/05/2023    PROTEINUA 1+ (A) 04/05/2023    KETONESUA Negative 04/05/2023    LEUKOCYTESUR Negative 04/05/2023    RBCUA 0-2 04/05/2023    WBCUA 0-2 04/05/2023    BACTERIA Rare 04/05/2023    CREATRANDUR 196.2 (H) 07/25/2023        Assessment:       ICD-10-CM ICD-9-CM   1. Type 2 diabetes mellitus without complication, without long-term current use of  insulin  E11.9 250.00   2. Encounter for screening for malignant neoplasm of breast, unspecified screening modality  Z12.39 V76.10        Plan:     1. Type 2 diabetes mellitus without complication, without long-term current use of insulin  Overview:  .   States her blood sugars is improving.  Has had 1 instance where her blood sugar was 220.  Otherwise her blood sugar has improved.  I have ordered diabetic education for her.    Orders:  -     Microalbumin/Creatinine Ratio, Urine    2. Encounter for screening for malignant neoplasm of breast, unspecified screening modality  -     Mammo Digital Screening Bilat w/ Tom; Future; Expected date: 07/01/2024         No follow-ups on file. In addition to their scheduled follow up, the patient has also been instructed to follow up on as needed basis.     Future Appointments   Date Time Provider Department Center   7/29/2024  9:00 AM Cox, Lori, FNP [facility]   3/31/2025  9:00 AM Cox, Lori, FNP [facility] Lori Cox, FNP

## 2024-07-08 ENCOUNTER — OFFICE VISIT (OUTPATIENT)
Dept: FAMILY MEDICINE | Facility: CLINIC | Age: 54
End: 2024-07-08
Payer: MEDICAID

## 2024-07-08 ENCOUNTER — HOSPITAL ENCOUNTER (OUTPATIENT)
Dept: RADIOLOGY | Facility: HOSPITAL | Age: 54
Discharge: HOME | End: 2024-07-08
Attending: NURSE PRACTITIONER
Payer: MEDICAID

## 2024-07-08 ENCOUNTER — TELEPHONE (OUTPATIENT)
Dept: NEUROLOGY | Facility: CLINIC | Age: 54
End: 2024-07-08
Payer: MEDICAID

## 2024-07-08 VITALS
BODY MASS INDEX: 39.15 KG/M2 | RESPIRATION RATE: 18 BRPM | HEART RATE: 68 BPM | OXYGEN SATURATION: 98 % | SYSTOLIC BLOOD PRESSURE: 137 MMHG | DIASTOLIC BLOOD PRESSURE: 85 MMHG | TEMPERATURE: 97 F | WEIGHT: 235 LBS | HEIGHT: 65 IN

## 2024-07-08 DIAGNOSIS — M25.561 ACUTE PAIN OF RIGHT KNEE: Primary | ICD-10-CM

## 2024-07-08 DIAGNOSIS — M25.561 ACUTE PAIN OF RIGHT KNEE: ICD-10-CM

## 2024-07-08 DIAGNOSIS — M43.06 PARS DEFECT OF LUMBAR SPINE: ICD-10-CM

## 2024-07-08 DIAGNOSIS — M54.50 LUMBAR PAIN: ICD-10-CM

## 2024-07-08 PROCEDURE — 73562 X-RAY EXAM OF KNEE 3: CPT | Mod: TC,RT

## 2024-07-08 PROCEDURE — 1159F MED LIST DOCD IN RCRD: CPT | Mod: CPTII,,, | Performed by: NURSE PRACTITIONER

## 2024-07-08 PROCEDURE — 3008F BODY MASS INDEX DOCD: CPT | Mod: CPTII,,, | Performed by: NURSE PRACTITIONER

## 2024-07-08 PROCEDURE — 99999 PR PBB SHADOW E&M-EST. PATIENT-LVL V: CPT | Mod: PBBFAC,,, | Performed by: NURSE PRACTITIONER

## 2024-07-08 PROCEDURE — 3075F SYST BP GE 130 - 139MM HG: CPT | Mod: CPTII,,, | Performed by: NURSE PRACTITIONER

## 2024-07-08 PROCEDURE — 3061F NEG MICROALBUMINURIA REV: CPT | Mod: CPTII,,, | Performed by: NURSE PRACTITIONER

## 2024-07-08 PROCEDURE — 3079F DIAST BP 80-89 MM HG: CPT | Mod: CPTII,,, | Performed by: NURSE PRACTITIONER

## 2024-07-08 PROCEDURE — 1160F RVW MEDS BY RX/DR IN RCRD: CPT | Mod: CPTII,,, | Performed by: NURSE PRACTITIONER

## 2024-07-08 PROCEDURE — 3066F NEPHROPATHY DOC TX: CPT | Mod: CPTII,,, | Performed by: NURSE PRACTITIONER

## 2024-07-08 PROCEDURE — 99213 OFFICE O/P EST LOW 20 MIN: CPT | Mod: S$PBB,,, | Performed by: NURSE PRACTITIONER

## 2024-07-08 PROCEDURE — 3051F HG A1C>EQUAL 7.0%<8.0%: CPT | Mod: CPTII,,, | Performed by: NURSE PRACTITIONER

## 2024-07-08 PROCEDURE — 99215 OFFICE O/P EST HI 40 MIN: CPT | Mod: PBBFAC,25 | Performed by: NURSE PRACTITIONER

## 2024-07-08 RX ORDER — SEMAGLUTIDE 0.68 MG/ML
0.25 INJECTION, SOLUTION SUBCUTANEOUS
COMMUNITY
Start: 2024-07-02

## 2024-07-08 NOTE — PROGRESS NOTES
Family Medicine    Patient ID: [id number]     Chief Complaint: Follow-up      HPI:     Raquel Byrne is here today for pain to the right knee, she fell on last Wed. Now has pain and swelling to the right knee.  She has crepitus to the patella.    Past Medical History:   Diagnosis Date    Abnormal vaginal bleeding     Anxiety     Constipation     Hypertension     Medial meniscus tear     right knee    Ovarian cyst     Torn ACL     right knee        Past Surgical History:   Procedure Laterality Date    INCISION AND DRAINAGE Left     leg left    LAPAROSCOPIC CHOLECYSTECTOMY WITH CHOLANGIOGRAPHY N/A 12/28/2023    Procedure: CHOLECYSTECTOMY, LAPAROSCOPIC, WITH CHOLANGIOGRAM;  Surgeon: Prabhu Hernandes MD;  Location: [facility];  Service: General;  Laterality: N/A;    TUBAL LIGATION          Social History     Tobacco Use    Smoking status: Former     Current packs/day: 0.00     Average packs/day: 0.3 packs/day for 23.0 years (5.8 ttl pk-yrs)     Types: Cigarettes     Start date: 1991     Quit date: 2014     Years since quitting: 10.5    Smokeless tobacco: Never   Substance and Sexual Activity    Alcohol use: Never    Drug use: Yes     Types: Marijuana     Comment: Medical    Sexual activity: Not Currently        Current Outpatient Medications   Medication Instructions    atorvastatin (LIPITOR) 40 mg, Oral, Nightly    blood sugar diagnostic Strp To check BG one times daily, to use with insurance preferred meter    blood-glucose meter kit To check BG one times daily, to use with insurance preferred meter    cephALEXin (KEFLEX) 500 mg, Oral, Every 12 hours    clonazePAM (KLONOPIN) 1 mg, Oral, 2 times daily, as needed for anxiety.    clotrimazole (LOTRIMIN) 1 % cream Topical (Top), 2 times daily    cyclobenzaprine (FLEXERIL) 10 mg, Oral, 3 times daily PRN    FLUoxetine 20 mg, Oral, Every morning    lancets Misc To check BG one times daily, to use with insurance preferred meter    metFORMIN (GLUCOPHAGE-XR) 500 mg, Oral, 2  times daily with meals    metoprolol succinate (TOPROL-XL) 25 mg, Oral, Daily    mupirocin (BACTROBAN) 2 % ointment Topical (Top), 3 times daily    ondansetron (ZOFRAN-ODT) 8 mg, Oral, Every 6 hours PRN    OZEMPIC 0.25 mg, Subcutaneous, Every 7 days       Review of patient's allergies indicates:   Allergen Reactions    Latex, natural rubber Hives, Itching and Swelling        Patient Care Team:  Renee Love FNP as PCP - General (Family Medicine)  Eliseo, Raji BLACK MD as Consulting Physician (Orthopedic Surgery)  Medicine, [facility] (Physical Therapy)  [facility] CLEMENTE Tucker as Nurse Practitioner (Behavioral Health)     Subjective:     Review of Systems   Constitutional:  Negative for appetite change, chills, diaphoresis and fever.   HENT:  Negative for ear pain, sinus pain and sore throat.    Eyes:  Negative for pain and visual disturbance.   Respiratory:  Negative for cough, shortness of breath and wheezing.    Cardiovascular:  Negative for chest pain, palpitations and leg swelling.   Gastrointestinal:  Negative for abdominal pain, blood in stool, diarrhea, nausea and vomiting.   Endocrine: Negative for cold intolerance.   Genitourinary:  Negative for difficulty urinating, dysuria, frequency and hematuria.   Musculoskeletal:  Positive for gait problem and joint swelling (right knee pain, fall). Negative for arthralgias and myalgias.   Skin:  Positive for wound (right patella, 1cm, abrasion, healing.). Negative for color change and rash.   Allergic/Immunologic: Negative.    Neurological:  Negative for dizziness, syncope, light-headedness and numbness.   Hematological: Negative.    Psychiatric/Behavioral: Negative.  Negative for dysphoric mood and suicidal ideas. The patient is not nervous/anxious.    All other systems reviewed and are negative.      12 point review of systems conducted, negative except as stated in the history of present illness. See HPI for  "details.    Objective:     Visit Vitals  /85 (BP Location: Left arm, Patient Position: Sitting, BP Method: X-Large (Automatic))   Pulse 68   Temp 97 °F (36.1 °C) (Temporal)   Resp 18   Ht 5' 5" (1.651 m)   Wt 106.6 kg (235 lb 0.2 oz)   SpO2 98%   BMI 39.11 kg/m²       Physical Exam  Vitals and nursing note reviewed.   Constitutional:       General: She is not in acute distress.     Appearance: She is not ill-appearing.   HENT:      Head: Normocephalic and atraumatic.      Mouth/Throat:      Mouth: Mucous membranes are moist.      Pharynx: Oropharynx is clear.   Eyes:      General: No scleral icterus.     Extraocular Movements: Extraocular movements intact.      Conjunctiva/sclera: Conjunctivae normal.      Pupils: Pupils are equal, round, and reactive to light.   Neck:      Vascular: No carotid bruit.   Cardiovascular:      Rate and Rhythm: Normal rate and regular rhythm.      Heart sounds: No murmur heard.     No friction rub. No gallop.   Pulmonary:      Effort: Pulmonary effort is normal. No respiratory distress.      Breath sounds: Normal breath sounds. No wheezing, rhonchi or rales.   Abdominal:      General: Abdomen is flat. Bowel sounds are normal. There is no distension.      Palpations: Abdomen is soft. There is no mass.      Tenderness: There is no abdominal tenderness.   Musculoskeletal:         General: Normal range of motion.      Cervical back: Normal range of motion and neck supple.      Right knee: Tenderness present over the medial joint line, MCL and patellar tendon. Abnormal patellar mobility (Crepitus).        Legs:       Comments: Pain with flexion to the medial line, pain to the lateral knee with flexion.   Skin:     General: Skin is warm and dry.      Findings: Abrasion (Healing) present.          Neurological:      General: No focal deficit present.      Mental Status: She is alert.   Psychiatric:         Mood and Affect: Mood normal.         Labs Reviewed:   Labs reviewed with patient, " verbalized understanding.  Chemistry:  Lab Results   Component Value Date     (L) 07/01/2024    K 4.1 07/01/2024    BUN 12.0 07/01/2024    CREATININE 0.83 07/01/2024    EGFRNORACEVR >60 07/01/2024    GLUCOSE 442 (H) 07/01/2024    CALCIUM 9.5 07/01/2024    ALKPHOS 90 07/01/2024    LABPROT 8.7 (H) 07/01/2024    ALBUMIN 3.6 07/01/2024    AST 43 (H) 07/01/2024    ALT 51 07/01/2024    MG 2.00 05/09/2024    TSH 1.918 03/28/2024        Lab Results   Component Value Date    HGBA1C 7.8 (H) 07/01/2024        Hematology:  Lab Results   Component Value Date    WBC 3.93 (L) 07/01/2024    HGB 14.8 07/01/2024    HCT 42.3 07/01/2024     (L) 07/01/2024       Lipid Panel:  Lab Results   Component Value Date    CHOL 211 (H) 07/01/2024    HDL 58 07/01/2024    .00 07/01/2024    TRIG 142 (H) 07/01/2024    TOTALCHOLEST 4 07/01/2024        Urine:  Lab Results   Component Value Date    APPEARANCEUA Clear 04/05/2023    SGUA >=1.030 04/05/2023    PROTEINUA 1+ (A) 04/05/2023    KETONESUA Negative 04/05/2023    LEUKOCYTESUR Negative 04/05/2023    RBCUA 0-2 04/05/2023    WBCUA 0-2 04/05/2023    BACTERIA Rare 04/05/2023    CREATRANDUR 64.7 07/01/2024        Assessment:       ICD-10-CM ICD-9-CM   1. Acute pain of right knee  M25.561 719.46        Plan:     1. Acute pain of right knee  Overview:  Patient fell on Wednesday week.  Patient fell on the right knee.  She now has crepitus to the patella on palpation.  She has swelling to the medial aspect of the right knee.  She also has pain on palpation to the medial aspect as well as the lateral aspect of the knee.  Pain with flexion of the knee, pain is noted to the lateral aspect with flexion.  Patient's knee is wrapped with Ace wrap, she notes that there is some relief with the wrap.  Though there is pain when she walks on it.  Patient is sent for x-rays.  Anterior drawer test is negative, posterior drawer test is positive.  Luis test is positive.    Orders:  -     X-Ray Knee  3 View Right; Future; Expected date: 07/08/2024  -     Ambulatory referral/consult to Orthopedics; Future; Expected date: 07/15/2024         No follow-ups on file. In addition to their scheduled follow up, the patient has also been instructed to follow up on as needed basis.     Future Appointments   Date Time Provider Department Center   7/10/2024  8:45 AM [facility] MRI1 550 LB LIMIT [facility] MRI [place]   7/29/2024  9:00 AM Cox, Lori, FNP [facility]   8/9/2024  2:30 PM [facility] MAMMO1 [facility] MAMMO [place]   3/31/2025  9:00 AM Cox, Lori, FNP [facility] Lori Cox, FNP

## 2024-07-08 NOTE — TELEPHONE ENCOUNTER
Good afternoon    A neurology referral was received and per our neurology provider review-the patient needs a referral to pain management.     Can refer to [facility] Name-[facility] Pain Management    The referral to [facility] Neurology will be cancelled.     Thank you

## 2024-07-10 ENCOUNTER — HOSPITAL ENCOUNTER (OUTPATIENT)
Dept: RADIOLOGY | Facility: HOSPITAL | Age: 54
Discharge: HOME | End: 2024-07-10
Attending: NURSE PRACTITIONER
Payer: MEDICAID

## 2024-07-10 DIAGNOSIS — M25.512 LEFT SHOULDER PAIN, UNSPECIFIED CHRONICITY: ICD-10-CM

## 2024-07-10 PROCEDURE — 73221 MRI JOINT UPR EXTREM W/O DYE: CPT | Mod: TC,LT

## 2024-07-17 ENCOUNTER — HOSPITAL ENCOUNTER (OUTPATIENT)
Dept: RADIOLOGY | Facility: HOSPITAL | Age: 54
Discharge: HOME | End: 2024-07-17
Attending: NURSE PRACTITIONER
Payer: MEDICAID

## 2024-07-17 DIAGNOSIS — M25.561 ACUTE PAIN OF RIGHT KNEE: ICD-10-CM

## 2024-07-17 PROCEDURE — 73721 MRI JNT OF LWR EXTRE W/O DYE: CPT | Mod: TC,RT

## 2024-07-18 DIAGNOSIS — S83.511D RUPTURE OF ANTERIOR CRUCIATE LIGAMENT OF RIGHT KNEE, SUBSEQUENT ENCOUNTER: Primary | ICD-10-CM

## 2024-07-19 DIAGNOSIS — S83.511A RUPTURE OF ANTERIOR CRUCIATE LIGAMENT OF RIGHT KNEE, INITIAL ENCOUNTER: Primary | ICD-10-CM

## 2024-07-19 RX ORDER — IBUPROFEN 800 MG/1
800 TABLET ORAL 3 TIMES DAILY
Qty: 180 TABLET | Refills: 2 | Status: SHIPPED | OUTPATIENT
Start: 2024-07-19

## 2024-07-29 ENCOUNTER — OFFICE VISIT (OUTPATIENT)
Dept: FAMILY MEDICINE | Facility: CLINIC | Age: 54
End: 2024-07-29
Payer: MEDICAID

## 2024-07-29 VITALS
WEIGHT: 235 LBS | TEMPERATURE: 97 F | SYSTOLIC BLOOD PRESSURE: 137 MMHG | OXYGEN SATURATION: 96 % | BODY MASS INDEX: 39.15 KG/M2 | HEIGHT: 65 IN | HEART RATE: 69 BPM | DIASTOLIC BLOOD PRESSURE: 89 MMHG | RESPIRATION RATE: 16 BRPM

## 2024-07-29 DIAGNOSIS — F41.9 ANXIETY: ICD-10-CM

## 2024-07-29 DIAGNOSIS — E11.9 TYPE 2 DIABETES MELLITUS WITHOUT COMPLICATION, WITHOUT LONG-TERM CURRENT USE OF INSULIN: ICD-10-CM

## 2024-07-29 DIAGNOSIS — Z12.4 PAP SMEAR FOR CERVICAL CANCER SCREENING: Primary | ICD-10-CM

## 2024-07-29 DIAGNOSIS — F17.290 OTHER TOBACCO PRODUCT NICOTINE DEPENDENCE, UNCOMPLICATED: ICD-10-CM

## 2024-07-29 DIAGNOSIS — R45.86 REBOUND MOOD SWINGS: ICD-10-CM

## 2024-07-29 PROBLEM — F17.200 NICOTINE DEPENDENCE, UNCOMPLICATED: Status: ACTIVE | Noted: 2024-07-29

## 2024-07-29 LAB — GLUCOSE SERPL-MCNC: 278 MG/DL (ref 70–110)

## 2024-07-29 PROCEDURE — 99999 PR PBB SHADOW E&M-EST. PATIENT-LVL V: CPT | Mod: PBBFAC,,, | Performed by: NURSE PRACTITIONER

## 2024-07-29 PROCEDURE — 1160F RVW MEDS BY RX/DR IN RCRD: CPT | Mod: CPTII,,, | Performed by: NURSE PRACTITIONER

## 2024-07-29 PROCEDURE — 1159F MED LIST DOCD IN RCRD: CPT | Mod: CPTII,,, | Performed by: NURSE PRACTITIONER

## 2024-07-29 PROCEDURE — 99215 OFFICE O/P EST HI 40 MIN: CPT | Mod: PBBFAC | Performed by: NURSE PRACTITIONER

## 2024-07-29 PROCEDURE — 3061F NEG MICROALBUMINURIA REV: CPT | Mod: CPTII,,, | Performed by: NURSE PRACTITIONER

## 2024-07-29 PROCEDURE — 3066F NEPHROPATHY DOC TX: CPT | Mod: CPTII,,, | Performed by: NURSE PRACTITIONER

## 2024-07-29 PROCEDURE — 3075F SYST BP GE 130 - 139MM HG: CPT | Mod: CPTII,,, | Performed by: NURSE PRACTITIONER

## 2024-07-29 PROCEDURE — 82962 GLUCOSE BLOOD TEST: CPT | Mod: PBBFAC | Performed by: NURSE PRACTITIONER

## 2024-07-29 PROCEDURE — 3079F DIAST BP 80-89 MM HG: CPT | Mod: CPTII,,, | Performed by: NURSE PRACTITIONER

## 2024-07-29 PROCEDURE — 99459 PELVIC EXAMINATION: CPT | Mod: S$PBB,,, | Performed by: NURSE PRACTITIONER

## 2024-07-29 PROCEDURE — 3051F HG A1C>EQUAL 7.0%<8.0%: CPT | Mod: CPTII,,, | Performed by: NURSE PRACTITIONER

## 2024-07-29 PROCEDURE — 99999PBSHW POCT GLUCOSE, HAND-HELD DEVICE: Mod: PBBFAC,,,

## 2024-07-29 PROCEDURE — 99396 PREV VISIT EST AGE 40-64: CPT | Mod: S$PBB,,, | Performed by: NURSE PRACTITIONER

## 2024-07-29 PROCEDURE — 99459 PELVIC EXAMINATION: CPT | Mod: PBBFAC | Performed by: NURSE PRACTITIONER

## 2024-07-29 PROCEDURE — 3008F BODY MASS INDEX DOCD: CPT | Mod: CPTII,,, | Performed by: NURSE PRACTITIONER

## 2024-07-29 RX ORDER — METFORMIN HYDROCHLORIDE 1000 MG/1
1000 TABLET ORAL 2 TIMES DAILY WITH MEALS
Qty: 180 TABLET | Refills: 3 | Status: SHIPPED | OUTPATIENT
Start: 2024-07-29 | End: 2025-07-29

## 2024-07-29 RX ORDER — FLUOXETINE HYDROCHLORIDE 40 MG/1
40 CAPSULE ORAL EVERY MORNING
Qty: 30 CAPSULE | Refills: 11 | Status: SHIPPED | OUTPATIENT
Start: 2024-07-29

## 2024-07-29 RX ORDER — SEMAGLUTIDE 0.68 MG/ML
0.5 INJECTION, SOLUTION SUBCUTANEOUS
Qty: 3 ML | Refills: 3 | Status: SHIPPED | OUTPATIENT
Start: 2024-07-29

## 2024-07-29 NOTE — PROGRESS NOTES
Family Medicine    Patient ID: [id number]     Chief Complaint: Gynecologic Exam and Follow-up      HPI:     Raquel Byrne is here today for PAP, denies sexual activity x 2 years. No discharge, has pain with palpation to the uterus.  She has been referred to OBGYN.  Elevated glucose.  Increased dose of metformin.  Also increased dose of Ozempic.  I stressed to the patient that she does need to check her blood sugars daily.    Past Medical History:   Diagnosis Date    Abnormal vaginal bleeding     Anxiety     Constipation     Hypertension     Medial meniscus tear     right knee    Ovarian cyst     Torn ACL     right knee        Past Surgical History:   Procedure Laterality Date    INCISION AND DRAINAGE Left     leg left    LAPAROSCOPIC CHOLECYSTECTOMY WITH CHOLANGIOGRAPHY N/A 12/28/2023    Procedure: CHOLECYSTECTOMY, LAPAROSCOPIC, WITH CHOLANGIOGRAM;  Surgeon: Prabhu Hernandes MD;  Location: [facility];  Service: General;  Laterality: N/A;    TUBAL LIGATION          Social History     Tobacco Use    Smoking status: Former     Current packs/day: 0.00     Average packs/day: 0.3 packs/day for 23.0 years (5.8 ttl pk-yrs)     Types: Cigarettes     Start date: 1991     Quit date: 2014     Years since quitting: 10.5    Smokeless tobacco: Never   Substance and Sexual Activity    Alcohol use: Never    Drug use: Yes     Types: Marijuana     Comment: Medical    Sexual activity: Not Currently        Current Outpatient Medications   Medication Instructions    atorvastatin (LIPITOR) 40 mg, Oral, Nightly    blood sugar diagnostic Strp To check BG one times daily, to use with insurance preferred meter    blood-glucose meter kit To check BG one times daily, to use with insurance preferred meter    clonazePAM (KLONOPIN) 1 mg, Oral, 2 times daily, as needed for anxiety.    clotrimazole (LOTRIMIN) 1 % cream Topical (Top), 2 times daily    cyclobenzaprine (FLEXERIL) 10 mg, Oral, 3 times daily PRN    FLUoxetine 20 mg, Oral, Every morning     ibuprofen (ADVIL,MOTRIN) 800 mg, Oral, 3 times daily, Take after eating, 6am, 2pm, 10pm.    lancets Misc To check BG one times daily, to use with insurance preferred meter    metFORMIN (GLUCOPHAGE) 1,000 mg, Oral, 2 times daily with meals    metoprolol succinate (TOPROL-XL) 25 mg, Oral, Daily    mupirocin (BACTROBAN) 2 % ointment Topical (Top), 3 times daily    ondansetron (ZOFRAN-ODT) 8 mg, Oral, Every 6 hours PRN    OZEMPIC 0.5 mg, Subcutaneous, Every 7 days       Review of patient's allergies indicates:   Allergen Reactions    Latex, natural rubber Hives, Itching and Swelling        Patient Care Team:  Renee Love FNP as PCP - General (Family Medicine)  Eliseo, Raji BLACK MD as Consulting Physician (Orthopedic Surgery)  Med[facility] (Physical Therapy)  [facility]Radha FNP as Nurse Practitioner (Behavioral Health)     Subjective:     Review of Systems   Constitutional:  Negative for appetite change, chills, diaphoresis and fever.   HENT:  Negative for ear pain, sinus pain and sore throat.    Eyes:  Negative for pain and visual disturbance.   Respiratory:  Negative for cough, shortness of breath and wheezing.    Cardiovascular:  Negative for chest pain, palpitations and leg swelling.   Gastrointestinal:  Negative for abdominal pain, blood in stool, diarrhea, nausea and vomiting.   Endocrine: Negative for cold intolerance.   Genitourinary:  Positive for vaginal discharge (spotting that lasts 1 week.  Denies bleeding at this time.). Negative for difficulty urinating, dysuria, frequency and hematuria.   Musculoskeletal:  Positive for gait problem (Right knee pain.  States is referred to orthopedic.). Negative for arthralgias, joint swelling and myalgias.   Skin:  Negative for color change and rash.   Allergic/Immunologic: Negative.    Neurological:  Negative for dizziness, syncope, light-headedness and numbness.   Hematological: Negative.   "  Psychiatric/Behavioral: Negative.  Negative for dysphoric mood and suicidal ideas. The patient is not nervous/anxious.    All other systems reviewed and are negative.      12 point review of systems conducted, negative except as stated in the history of present illness. See HPI for details.    Objective:     Visit Vitals  /89 (BP Location: Right arm, Patient Position: Sitting, BP Method: X-Large (Automatic))   Pulse 69   Temp 97.3 °F (36.3 °C) (Temporal)   Resp 16   Ht 5' 5" (1.651 m)   Wt 106.6 kg (235 lb 0.2 oz)   SpO2 96%   BMI 39.11 kg/m²       Physical Exam  Vitals and nursing note reviewed. Exam conducted with a chaperone present.   Constitutional:       General: She is not in acute distress.     Appearance: She is not ill-appearing.   HENT:      Head: Normocephalic and atraumatic.      Mouth/Throat:      Mouth: Mucous membranes are moist.      Pharynx: Oropharynx is clear.   Eyes:      General: No scleral icterus.     Extraocular Movements: Extraocular movements intact.      Conjunctiva/sclera: Conjunctivae normal.      Pupils: Pupils are equal, round, and reactive to light.   Neck:      Vascular: No carotid bruit.   Cardiovascular:      Rate and Rhythm: Normal rate and regular rhythm.      Heart sounds: No murmur heard.     No friction rub. No gallop.   Pulmonary:      Effort: Pulmonary effort is normal. No respiratory distress.      Breath sounds: Normal breath sounds. No wheezing, rhonchi or rales.   Abdominal:      General: Abdomen is flat. Bowel sounds are normal. There is no distension.      Palpations: Abdomen is soft. There is no mass.      Tenderness: There is no abdominal tenderness.   Genitourinary:     Exam position: Lithotomy position.      Labia:         Right: No lesion.       Vagina: Normal. No vaginal discharge, tenderness, lesions or prolapsed vaginal walls.      Cervix: No discharge, lesion or cervical bleeding.      Uterus: No uterine prolapse.    Musculoskeletal:         General: " Normal range of motion.      Cervical back: Normal range of motion and neck supple.   Lymphadenopathy:      Lower Body: No right inguinal adenopathy. No left inguinal adenopathy.   Skin:     General: Skin is warm and dry.   Neurological:      General: No focal deficit present.      Mental Status: She is alert.   Psychiatric:         Mood and Affect: Mood normal.         Labs Reviewed:   Labs reviewed with patient, verbalized understanding.  Chemistry:  Lab Results   Component Value Date     (L) 07/01/2024    K 4.1 07/01/2024    BUN 12.0 07/01/2024    CREATININE 0.83 07/01/2024    EGFRNORACEVR >60 07/01/2024    GLUCOSE 442 (H) 07/01/2024    CALCIUM 9.5 07/01/2024    ALKPHOS 90 07/01/2024    LABPROT 8.7 (H) 07/01/2024    ALBUMIN 3.6 07/01/2024    AST 43 (H) 07/01/2024    ALT 51 07/01/2024    MG 2.00 05/09/2024    TSH 1.918 03/28/2024        Lab Results   Component Value Date    HGBA1C 7.8 (H) 07/01/2024        Hematology:  Lab Results   Component Value Date    WBC 3.93 (L) 07/01/2024    HGB 14.8 07/01/2024    HCT 42.3 07/01/2024     (L) 07/01/2024       Lipid Panel:  Lab Results   Component Value Date    CHOL 211 (H) 07/01/2024    HDL 58 07/01/2024    .00 07/01/2024    TRIG 142 (H) 07/01/2024    TOTALCHOLEST 4 07/01/2024        Urine:  Lab Results   Component Value Date    APPEARANCEUA Clear 04/05/2023    SGUA >=1.030 04/05/2023    PROTEINUA 1+ (A) 04/05/2023    KETONESUA Negative 04/05/2023    LEUKOCYTESUR Negative 04/05/2023    RBCUA 0-2 04/05/2023    WBCUA 0-2 04/05/2023    BACTERIA Rare 04/05/2023    CREATRANDUR 64.7 07/01/2024        Assessment:       ICD-10-CM ICD-9-CM   1. Pap smear for cervical cancer screening  Z12.4 V76.2   2. Other tobacco product nicotine dependence, uncomplicated  F17.290 305.1   3. Type 2 diabetes mellitus without complication, without long-term current use of insulin  E11.9 250.00        Plan:     1. Pap smear for cervical cancer screening  Overview:  Positive ASCUS  HPV PAP 8/23/23, PAP today,neg HPV, continues to spot during her cycle, referred to GYN.  No cervical discharge, lesions or tenderness. No vaginal discharge or lesions. No labial lesions, erythema. (Chaperone present)     Orders:  -     Liquid-Based Pap Smear, Screening    2. Other tobacco product nicotine dependence, uncomplicated    3. Type 2 diabetes mellitus without complication, without long-term current use of insulin  Overview:  , did eat, will increase her dose of metformin and ozempic. Advised to check morning glucose.   Lab Results   Component Value Date    HGBA1C 7.8 (H) 07/01/2024    HGBA1C 6.9 03/28/2024    .00 07/01/2024    CREATININE 0.83 07/01/2024      Diabetes Medications               metFORMIN (GLUCOPHAGE) 1000 MG tablet Take 1 tablet (1,000 mg total) by mouth 2 (two) times daily with meals.    OZEMPIC 0.25 mg or 0.5 mg (2 mg/3 mL) pen injector Inject 0.5 mg into the skin every 7 days.          On ACE and Statin according to guidelines.  Discussed caution with SGLT2s + diuretics as concomitant use can cause volume depletion. Discussed caution with DPP-Ivs and HF risk.  Follow ADA Diet. Avoid soda, simple sweets, and limit rice/pasta/breads/starches (no more than 45-50 grams per meal).  Maintain healthy weight with goal BMI <30.  Exercise 5 times per week for 30 minutes per day.  Stressed importance of daily foot exams.  Stressed importance of annual dilated eye exam.          Orders:  -     metFORMIN (GLUCOPHAGE) 1000 MG tablet; Take 1 tablet (1,000 mg total) by mouth 2 (two) times daily with meals.  Dispense: 180 tablet; Refill: 3  -     OZEMPIC 0.25 mg or 0.5 mg (2 mg/3 mL) pen injector; Inject 0.5 mg into the skin every 7 days.  Dispense: 3 mL; Refill: 3  -     POCT Glucose, Hand-Held Device         No follow-ups on file. In addition to their scheduled follow up, the patient has also been instructed to follow up on as needed basis.     Future Appointments   Date Time Provider  Department Center   8/5/2024  8:50 AM RESIDENT 2, [facility] ORTHOPEDICS [facility] ORTHO [place]   8/9/2024  2:30 PM [facility] MAMMO1 [facility] MAMMO [place]   3/31/2025  9:00 AM Cox, Lori, FNP [facility] FAMMED [facility] Lori Cox, FNP

## 2024-08-01 ENCOUNTER — OFFICE VISIT (OUTPATIENT)
Dept: OBSTETRICS AND GYNECOLOGY | Facility: CLINIC | Age: 54
End: 2024-08-01
Payer: MEDICAID

## 2024-08-01 VITALS
WEIGHT: 239 LBS | BODY MASS INDEX: 39.82 KG/M2 | SYSTOLIC BLOOD PRESSURE: 136 MMHG | DIASTOLIC BLOOD PRESSURE: 84 MMHG | HEIGHT: 65 IN

## 2024-08-01 DIAGNOSIS — N95.0 POSTMENOPAUSAL BLEEDING: Primary | ICD-10-CM

## 2024-08-01 PROCEDURE — 3008F BODY MASS INDEX DOCD: CPT | Mod: CPTII,,,

## 2024-08-01 PROCEDURE — 99203 OFFICE O/P NEW LOW 30 MIN: CPT | Mod: ,,,

## 2024-08-01 PROCEDURE — 1159F MED LIST DOCD IN RCRD: CPT | Mod: CPTII,,,

## 2024-08-01 PROCEDURE — 3061F NEG MICROALBUMINURIA REV: CPT | Mod: CPTII,,,

## 2024-08-01 PROCEDURE — 3051F HG A1C>EQUAL 7.0%<8.0%: CPT | Mod: CPTII,,,

## 2024-08-01 PROCEDURE — 3066F NEPHROPATHY DOC TX: CPT | Mod: CPTII,,,

## 2024-08-01 PROCEDURE — 3079F DIAST BP 80-89 MM HG: CPT | Mod: CPTII,,,

## 2024-08-01 PROCEDURE — 1160F RVW MEDS BY RX/DR IN RCRD: CPT | Mod: CPTII,,,

## 2024-08-01 PROCEDURE — 3075F SYST BP GE 130 - 139MM HG: CPT | Mod: CPTII,,,

## 2024-08-01 NOTE — PROGRESS NOTES
"Subjective     Patient ID: Raquel Byrne is a 54 y.o. female.    Chief Complaint:  Vaginal Bleeding (Has been postmenopausal since 2023, recently having light pink spotting. )      History of Present Illness  Vaginal Bleeding      Raquel Byrne 54 y.o. White female  presents to clinic as a referral from Renee Love NP for postmenopausal bleeding.  States she has not had a full cycle since 2023 and has recently had monthly spotting and cramping over the past 3-4 months.  She denies any discharge or odor. Pt had a pap yesterday with PCP.        GYN & OB History  No LMP recorded. Patient is postmenopausal.   Date of Last Pap: 2023    OB History    Para Term  AB Living   2 2 2         SAB IAB Ectopic Multiple Live Births                  # Outcome Date GA Lbr Italo/2nd Weight Sex Type Anes PTL Lv   2 Term 92     Vag-Spont      1 Term 90     Vag-Spont          Review of Systems  Review of Systems   Constitutional: Negative.    HENT: Negative.     Eyes: Negative.    Respiratory: Negative.     Cardiovascular: Negative.    Gastrointestinal: Negative.    Endocrine: Negative.    Genitourinary:  Positive for vaginal bleeding (postmenopausal bleeding).   Musculoskeletal: Negative.    Integumentary:  Negative.   Neurological: Negative.    Hematological: Negative.    Psychiatric/Behavioral: Negative.     Breast: negative.           Objective   /84 (BP Location: Right arm)   Ht 5' 5" (1.651 m)   Wt 108.4 kg (239 lb)   BMI 39.77 kg/m²     Physical Exam:   Constitutional: She is oriented to person, place, and time. She appears well-developed and well-nourished.    HENT:   Head: Normocephalic.    Eyes: Pupils are equal, round, and reactive to light. EOM are normal.     Cardiovascular:  Normal rate.             Pulmonary/Chest: Effort normal.        Abdominal: Soft.     Genitourinary:    Inguinal canal, urethra, uterus, right adnexa, left adnexa and rectum normal.      " Pelvic exam was performed with patient in the lithotomy position.   The external female genitalia was normal.     Labial bartholins normal.Cervix is normal. Vagina was moist.Normal urethral meatus.          Musculoskeletal: Normal range of motion.       Neurological: She is alert and oriented to person, place, and time.    Skin: Skin is warm and dry.    Psychiatric: She has a normal mood and affect.            Assessment and Plan     1. Postmenopausal bleeding          Plan:  FSH to confirm menopause.    One swab with leuk, myco, and urea to r/o any possible cause of infection.     Discussed need for ultrasound to measure endometrial thickness and depending on results the possibility of endometrial sampling       Patient understands that we must rule out a malignancy or premalignant condition       Return following ultrasound/labs/one swab    ELIO from PCP for pap results       Bleeding precautions      Educated    Pt to RTC in 2 weeks for results review and POC discussion with Dr. Ford.          US Pelvis to assess endometrial stripe

## 2024-08-05 ENCOUNTER — OFFICE VISIT (OUTPATIENT)
Dept: ORTHOPEDICS | Facility: CLINIC | Age: 54
End: 2024-08-05
Payer: MEDICAID

## 2024-08-05 ENCOUNTER — HOSPITAL ENCOUNTER (EMERGENCY)
Facility: HOSPITAL | Age: 54
Discharge: HOME | End: 2024-08-05
Attending: EMERGENCY MEDICINE
Payer: MEDICAID

## 2024-08-05 ENCOUNTER — TELEPHONE (OUTPATIENT)
Dept: FAMILY MEDICINE | Facility: CLINIC | Age: 54
End: 2024-08-05
Payer: MEDICAID

## 2024-08-05 VITALS
TEMPERATURE: 98 F | HEIGHT: 66 IN | WEIGHT: 237.19 LBS | BODY MASS INDEX: 38.12 KG/M2 | HEART RATE: 74 BPM | DIASTOLIC BLOOD PRESSURE: 73 MMHG | SYSTOLIC BLOOD PRESSURE: 119 MMHG

## 2024-08-05 VITALS
TEMPERATURE: 98 F | SYSTOLIC BLOOD PRESSURE: 132 MMHG | HEIGHT: 66 IN | HEART RATE: 59 BPM | DIASTOLIC BLOOD PRESSURE: 69 MMHG | OXYGEN SATURATION: 96 % | RESPIRATION RATE: 20 BRPM | BODY MASS INDEX: 38.12 KG/M2 | WEIGHT: 237.19 LBS

## 2024-08-05 DIAGNOSIS — N76.0 GARDNERELLA VAGINITIS: Primary | ICD-10-CM

## 2024-08-05 DIAGNOSIS — M79.604 RIGHT LEG PAIN: ICD-10-CM

## 2024-08-05 DIAGNOSIS — B96.89 GARDNERELLA VAGINITIS: Primary | ICD-10-CM

## 2024-08-05 DIAGNOSIS — S86.811A STRAIN OF RIGHT CALF MUSCLE: Primary | ICD-10-CM

## 2024-08-05 DIAGNOSIS — M24.159 DEGENERATIVE TEAR OF ACETABULAR LABRUM: ICD-10-CM

## 2024-08-05 DIAGNOSIS — M25.561 ACUTE PAIN OF RIGHT KNEE: ICD-10-CM

## 2024-08-05 PROCEDURE — 3066F NEPHROPATHY DOC TX: CPT | Mod: CPTII,,, | Performed by: ORTHOPAEDIC SURGERY

## 2024-08-05 PROCEDURE — 3051F HG A1C>EQUAL 7.0%<8.0%: CPT | Mod: CPTII,,, | Performed by: ORTHOPAEDIC SURGERY

## 2024-08-05 PROCEDURE — 3061F NEG MICROALBUMINURIA REV: CPT | Mod: CPTII,,, | Performed by: ORTHOPAEDIC SURGERY

## 2024-08-05 PROCEDURE — 99499 UNLISTED E&M SERVICE: CPT | Mod: S$PBB,,, | Performed by: ORTHOPAEDIC SURGERY

## 2024-08-05 PROCEDURE — 3074F SYST BP LT 130 MM HG: CPT | Mod: CPTII,,, | Performed by: ORTHOPAEDIC SURGERY

## 2024-08-05 PROCEDURE — 3078F DIAST BP <80 MM HG: CPT | Mod: CPTII,,, | Performed by: ORTHOPAEDIC SURGERY

## 2024-08-05 PROCEDURE — 3008F BODY MASS INDEX DOCD: CPT | Mod: CPTII,,, | Performed by: ORTHOPAEDIC SURGERY

## 2024-08-05 PROCEDURE — 99284 EMERGENCY DEPT VISIT MOD MDM: CPT | Mod: 25

## 2024-08-05 PROCEDURE — 99215 OFFICE O/P EST HI 40 MIN: CPT | Mod: PBBFAC,25,27

## 2024-08-05 PROCEDURE — 1159F MED LIST DOCD IN RCRD: CPT | Mod: CPTII,,, | Performed by: ORTHOPAEDIC SURGERY

## 2024-08-05 RX ORDER — METRONIDAZOLE 500 MG/1
500 TABLET ORAL EVERY 12 HOURS
Qty: 14 TABLET | Refills: 0 | Status: SHIPPED | OUTPATIENT
Start: 2024-08-05

## 2024-08-05 RX ORDER — DICLOFENAC SODIUM 75 MG/1
75 TABLET, DELAYED RELEASE ORAL 2 TIMES DAILY
Qty: 14 TABLET | Refills: 0 | Status: SHIPPED | OUTPATIENT
Start: 2024-08-05 | End: 2024-08-12

## 2024-08-05 RX ORDER — GABAPENTIN 100 MG/1
100 CAPSULE ORAL 3 TIMES DAILY
Qty: 21 CAPSULE | Refills: 0 | Status: SHIPPED | OUTPATIENT
Start: 2024-08-05 | End: 2024-08-12

## 2024-08-05 NOTE — PROGRESS NOTES
[facility] Orthopaedic Surgery Clinic Note    Raquel Byrne is a 54 y.o. female presents to clinic today for evaluation of acute on chronic right knee pain.  Patient reports injury to right knee at work about 2 years at which time MRI was obtained and patient was told she had a ACL tear.  Patient reports plan was for surgical reconstruction at that time but due to issues with worker's comp patient had difficulty receiving care.  Denies any physical therapy at that time.  Since then patient reports feeling weak and unstable in right knee as well as entire right lower extremity.  Patient sustained new fall 4 weeks ago onto right knee exacerbating pain.  Patient was seen by PCP who obtained MRI.  Since then patient reports continued pain to right knee in continued feeling of weakness.  Patient has been taking ibuprofen and Tylenol for pain.  Patient has been using over-the-counter hinged knee sleeve with minimal improvement in pain.  Today patient reports pain globally about knee greatest in posterior knee and calf.  Reports some paresthesias globally in foot as well as some mild swelling.  Reports foot swelling and calf pain has been present since around her injury.  Reports some occasional shortness of breath and chest pain.  Reports history of fatal PE in patient's mother.    PMH:   Past Medical History:   Diagnosis Date    Abnormal vaginal bleeding     Anxiety     Constipation     Hypertension     Medial meniscus tear     right knee    Ovarian cyst     Torn ACL     right knee       PSH:   Past Surgical History:   Procedure Laterality Date    INCISION AND DRAINAGE Left     leg left    LAPAROSCOPIC CHOLECYSTECTOMY WITH CHOLANGIOGRAPHY N/A 12/28/2023    Procedure: CHOLECYSTECTOMY, LAPAROSCOPIC, WITH CHOLANGIOGRAM;  Surgeon: Prabhu Hernandes MD;  Location: [facility];  Service: General;  Laterality: N/A;    TUBAL LIGATION         SH:   Social History     Socioeconomic History    Marital status: Single   Tobacco Use     Smoking status: Former     Current packs/day: 0.00     Average packs/day: 0.3 packs/day for 23.0 years (5.8 ttl pk-yrs)     Types: Cigarettes     Start date: 1/1/1991     Quit date: 2014     Years since quitting: 10.6    Smokeless tobacco: Never   Substance and Sexual Activity    Alcohol use: Never    Drug use: Not Currently     Types: Marijuana     Comment: Medical    Sexual activity: Not Currently     Partners: Male     Birth control/protection: See Surgical Hx   Social History Narrative    ** Merged History Encounter **          Social Determinants of Health     Financial Resource Strain: High Risk (12/14/2023)    Overall Financial Resource Strain (CARDIA)     Difficulty of Paying Living Expenses: Very hard   Food Insecurity: Food Insecurity Present (12/14/2023)    Hunger Vital Sign     Worried About Running Out of Food in the Last Year: Often true     Ran Out of Food in the Last Year: Sometimes true   Transportation Needs: No Transportation Needs (12/14/2023)    PRAPARE - Transportation     Lack of Transportation (Medical): No     Lack of Transportation (Non-Medical): No   Physical Activity: Unknown (12/14/2023)    Exercise Vital Sign     Days of Exercise per Week: 0 days   Recent Concern: Physical Activity - Inactive (12/14/2023)    Exercise Vital Sign     Days of Exercise per Week: 0 days     Minutes of Exercise per Session: 0 min   Stress: No Stress Concern Present (12/14/2023)    [employer] - Occupational Stress Questionnaire     Feeling of Stress : Not at all   Housing Stability: High Risk (12/14/2023)    Housing Stability Vital Sign     Unable to Pay for Housing in the Last Year: Yes     Number of Places Lived in the Last Year: 3     Unstable Housing in the Last Year: No       FH:   Family History   Problem Relation Name Age of Onset    Breast cancer Maternal Grandmother Grandmother     Diabetes Father Sawyer afshin     Hypertension Father Sawyer afshin     Heart disease Father  Sawyer byrne     Skin cancer Father Sawyer byrne     Clotting disorder Mother      Ovarian cancer Sister Irina Choe        Allergies:   Review of patient's allergies indicates:   Allergen Reactions    Latex, natural rubber Hives, Itching and Swelling     Physical Exam:  Vitals:    08/05/24 0841   BP: 119/73   Pulse: 74   Temp: 98.2 °F (36.8 °C)       General: NAD  Cardio: RRR by peripheral pulse  Pulm: Normal WOB on room air, symmetric chest rise  Abd: Soft, NT/ND    MSK:  Right lower extremity  Skin benign  Moderate edema to knee  Mild edema to ankle and foot  TTP globally over medial and lateral joint lines and posterior knee  Significant tenderness over calf  Positive homans  Knee range of motion 0-90 with pain with flexion past 75  Stable to varus valgus stress  Negative posterior drawer  Unable to obtain anterior drawer or Lachman's due to patient's significant calf pain  Motor intact to EHL/FHL/GCS/TA  Sensation intact to light touch in SP/DP/T/VICENTE/SA but reports global paresthesias in foot  2+ DP      Imaging:   X-ray right knee 07/08/2024 reveals no fractures dislocations, some moderate degenerative changes with medial joint space narrowing with some subchondral sclerosis  MRI right knee 07/08/2024 reveals full-thickness ACL tear, medial meniscus posterior horn tear that appears to possibly involve meniscal root, PCL intact    Assessment:  Raquel Byrne is a 54 y.o. with acute on chronic right knee pain, history of ACL tear 2 years ago never underwent treatment or therapy, with a recent fall and new pain with new MRI showing ACL tear and medial meniscus posterior horn possible root tear  Significant concern for DVT on exam today    -discussed with the patient significant concern for DVT on exam today which is further worrisome given patient's mother history of fatal PE, recommended the patient go to ED for DVT ultrasound, spoke with [facility] in ED who is willing to accept the patient from clinic  -discussed  imaging findings with the patient, given chronicity of ACL tear and the patient never undergoing any physical therapy, recommended trial of physical therapy, if patient does not improve with therapy we will plan for ACL reconstruction and medial meniscus repair versus debridement possible root repair, patient agreeable  -PT referral provided  -WBAT  -patient escorted to ED at end of visit  -follow up in 3 months after PT    Seun Goetz MD  [facility] Orthopaedic Surgery  8/5/2024 9:10 AM

## 2024-08-05 NOTE — ED PROVIDER NOTES
Encounter Date: 8/5/2024       History     Chief Complaint   Patient presents with    Leg Pain     C/o right leg pain and swelling. Sent from ortho clinic due to possible dvt.      Raquel Byrne is a 54 y.o. female who presents to the ED with complaints of right calf pain and burning that started 2 days ago. She reports she has a torn ACL and recently had a fall so she was seeing the orthopedist this morning and they sent her to the ED for rule out blood clot. She reports her mother passed away from a PE so she is very nervous about it. Denies redness and swelling.      The history is provided by the patient. No  was used.     Review of patient's allergies indicates:   Allergen Reactions    Latex, natural rubber Hives, Itching and Swelling     Past Medical History:   Diagnosis Date    Abnormal vaginal bleeding     Anxiety     Constipation     Hypertension     Medial meniscus tear     right knee    Ovarian cyst     Torn ACL     right knee     Past Surgical History:   Procedure Laterality Date    INCISION AND DRAINAGE Left     leg left    LAPAROSCOPIC CHOLECYSTECTOMY WITH CHOLANGIOGRAPHY N/A 12/28/2023    Procedure: CHOLECYSTECTOMY, LAPAROSCOPIC, WITH CHOLANGIOGRAM;  Surgeon: Prabhu Hernandes MD;  Location: [facility];  Service: General;  Laterality: N/A;    TUBAL LIGATION       Family History   Problem Relation Name Age of Onset    Breast cancer Maternal Grandmother Grandmother     Diabetes Father Sawyer byrne     Hypertension Father Sawyer byrne     Heart disease Father Sawyer byrne     Skin cancer Father Sawyer byrne     Clotting disorder Mother      Ovarian cancer Sister Irina Choe      Social History     Tobacco Use    Smoking status: Former     Current packs/day: 0.00     Average packs/day: 0.3 packs/day for 23.0 years (5.8 ttl pk-yrs)     Types: Cigarettes     Start date: 1/1/1991     Quit date: 2014     Years since quitting: 10.6    Smokeless tobacco: Never   Substance Use Topics     Alcohol use: Never    Drug use: Not Currently     Types: Marijuana     Comment: Medical     Review of Systems   Constitutional:  Negative for chills, fatigue and fever.   HENT:  Negative for congestion, ear pain, sinus pain and sore throat.    Eyes:  Negative for pain.   Respiratory:  Negative for cough, chest tightness and shortness of breath.    Cardiovascular:  Negative for chest pain.   Gastrointestinal:  Negative for abdominal pain, constipation, diarrhea, nausea and vomiting.   Genitourinary:  Negative for dysuria.   Musculoskeletal:  Positive for myalgias. Negative for back pain and joint swelling.   Skin:  Negative for color change and rash.   Neurological:  Negative for dizziness and weakness.   Psychiatric/Behavioral:  Negative for behavioral problems and confusion.        Physical Exam     Initial Vitals [08/05/24 1005]   BP Pulse Resp Temp SpO2   (!) 152/76 64 20 98.1 °F (36.7 °C) 98 %      MAP       --         Physical Exam    Nursing note and vitals reviewed.  Constitutional: She appears well-developed and well-nourished.   HENT:   Head: Normocephalic and atraumatic.   Nose: Nose normal.   Eyes: EOM are normal. Pupils are equal, round, and reactive to light.   Neck: Neck supple. No thyromegaly present. No JVD present.   Normal range of motion.  Cardiovascular:  Normal rate, regular rhythm, normal heart sounds and intact distal pulses.           No murmur heard.  Pulmonary/Chest: Breath sounds normal. No respiratory distress. She has no wheezes. She has no rhonchi. She has no rales. She exhibits no tenderness.   Abdominal: Abdomen is soft. Bowel sounds are normal. She exhibits no distension. There is no abdominal tenderness. There is no rebound and no guarding.   Musculoskeletal:         General: No tenderness or edema. Normal range of motion.      Cervical back: Normal range of motion and neck supple.        Legs:      Lymphadenopathy:     She has no cervical adenopathy.   Neurological: She is alert  and oriented to person, place, and time.   Skin: Skin is warm and dry. Capillary refill takes less than 2 seconds.   Psychiatric: She has a normal mood and affect. Thought content normal.         ED Course   Procedures  Labs Reviewed - No data to display       Imaging Results    None          Medications - No data to display  Medical Decision Making  DDX: DVT, muscle strain, nerve pain, among others    Raquel Byrne is a 54 y.o. female who presents to the ED with complaints of right calf pain and burning that started 2 days ago. She reports she has a torn ACL and recently had a fall so she was seeing the orthopedist this morning and they sent her to the ED for rule out blood clot. She reports her mother passed away from a PE so she is very nervous about it. Denies redness and swelling.      Hospital Course: CV US NIVA R leg ordered. Negative for DVT. Likely muscular/nerve in nature. Will DC home with medication for symptom control. Strict return precautions given. Stable for discharge.                                      Clinical Impression:  Final diagnoses:  [M79.604] Right leg pain  [S86.811A] Strain of right calf muscle (Primary)          ED Disposition Condition    Discharge Stable          ED Prescriptions       Medication Sig Dispense Start Date End Date Auth. Provider    gabapentin (NEURONTIN) 100 MG capsule Take 1 capsule (100 mg total) by mouth 3 (three) times daily. for 7 days 21 capsule 8/5/2024 8/12/2024 Allison Garrett PA-C    diclofenac (VOLTAREN) 75 MG EC tablet Take 1 tablet (75 mg total) by mouth 2 (two) times daily. for 7 days 14 tablet 8/5/2024 8/12/2024 Allison Garrett PA-C          Follow-up Information       Follow up With Specialties Details Why Contact Info    Renee Love, [facility]   [address] [place] [address]  [phone]      [facility] Emergency Medicine In 5 days As needed, If symptoms worsen, For suture removal [address]  [facility] [address]  [phone]             Allison Garrett PA-C  08/05/24 4348

## 2024-08-06 ENCOUNTER — HOSPITAL ENCOUNTER (OUTPATIENT)
Dept: RADIOLOGY | Facility: HOSPITAL | Age: 54
Discharge: HOME | End: 2024-08-06
Payer: MEDICAID

## 2024-08-06 DIAGNOSIS — N95.0 POSTMENOPAUSAL BLEEDING: ICD-10-CM

## 2024-08-06 PROCEDURE — 76856 US EXAM PELVIC COMPLETE: CPT | Mod: TC

## 2024-08-06 PROCEDURE — 76830 TRANSVAGINAL US NON-OB: CPT | Mod: TC

## 2024-08-08 ENCOUNTER — TELEPHONE (OUTPATIENT)
Dept: OBSTETRICS AND GYNECOLOGY | Facility: CLINIC | Age: 54
End: 2024-08-08
Payer: MEDICAID

## 2024-08-08 DIAGNOSIS — B37.9 YEAST INFECTION: ICD-10-CM

## 2024-08-08 DIAGNOSIS — Z22.39 CARRIER OF UREAPLASMA UREALYTICUM: Primary | ICD-10-CM

## 2024-08-08 RX ORDER — DOXYCYCLINE 100 MG/1
100 CAPSULE ORAL 2 TIMES DAILY
Qty: 28 CAPSULE | Refills: 0 | Status: SHIPPED | OUTPATIENT
Start: 2024-08-08 | End: 2024-08-22

## 2024-08-08 RX ORDER — FLUCONAZOLE 150 MG/1
150 TABLET ORAL
Qty: 2 TABLET | Refills: 0 | Status: SHIPPED | OUTPATIENT
Start: 2024-08-08 | End: 2024-08-12

## 2024-08-09 ENCOUNTER — HOSPITAL ENCOUNTER (OUTPATIENT)
Dept: RADIOLOGY | Facility: HOSPITAL | Age: 54
Discharge: HOME | End: 2024-08-09
Attending: NURSE PRACTITIONER
Payer: MEDICAID

## 2024-08-09 DIAGNOSIS — Z12.39 ENCOUNTER FOR SCREENING FOR MALIGNANT NEOPLASM OF BREAST, UNSPECIFIED SCREENING MODALITY: ICD-10-CM

## 2024-08-09 PROCEDURE — 77063 BREAST TOMOSYNTHESIS BI: CPT | Mod: TC

## 2024-08-09 PROCEDURE — 77067 SCR MAMMO BI INCL CAD: CPT | Mod: TC

## 2024-08-09 PROCEDURE — 77063 BREAST TOMOSYNTHESIS BI: CPT | Mod: 26,,, | Performed by: STUDENT IN AN ORGANIZED HEALTH CARE EDUCATION/TRAINING PROGRAM

## 2024-08-09 PROCEDURE — 77067 SCR MAMMO BI INCL CAD: CPT | Mod: 26,,, | Performed by: STUDENT IN AN ORGANIZED HEALTH CARE EDUCATION/TRAINING PROGRAM

## 2024-08-12 DIAGNOSIS — F41.9 ANXIETY: ICD-10-CM

## 2024-08-12 RX ORDER — CLONAZEPAM 1 MG/1
1 TABLET ORAL 2 TIMES DAILY
Qty: 60 TABLET | Refills: 0 | Status: SHIPPED | OUTPATIENT
Start: 2024-08-12

## 2024-08-12 NOTE — PROGRESS NOTES
Chief Complaint     Follow-up    HPI:     Patient is a 54 y.o.  presents today to review pelvic u/s, labs, cultures due to complaints of PMB. Reports no bleeding in the last month.     Treated for Ureaplasma on 24    Gyn History:    Menstrual History  Cycle: No  Menarche Age: 11 years    Menopause  Menopause Age: 1 years  Post Menopausal Bleeding: No  Hormone Replacement Therapy: No    Pap History  Last pap date: 24  Result: Normal (NIL, Negative HPV/GC/CZ/TV)  History of Abnormal Pap: No  HPV Vaccine Completed: No (0/3)    [place]  Sexually Active: No  STI History: No  Contraception: Yes  Contraception Type: Tubal ligation/salpingectony    Breast History  Last Breast Imaging Date: Yes  Date: 24 (Result pending 8/15/24)  History of Abnormal Breast Imaging : No  History of Breast Biopsy: No    24 pelvic u/s:  FINDINGS:  No prior pelvic ultrasound is available for comparison.  There is a submucosal 15 mm anterior wall leiomyoma.  The endometrial stripe measures 10 mm.  The uterus measures 76 x 47 x 58 mm.  Color Doppler is documented in both ovaries.  The right ovary measures 21 x 7 x 12 mm.  The left ovary measures 23 x 10 x 18 mm.  There is no adnexal mass or free fluid.  Impression:  1. Anterior wall submucosal leiomyoma.  2. The endometrial stripe is thickened.    Per previous note 24;  Raquel Byrne 54 y.o. White female  presents to clinic as a referral from Renee Love NP for postmenopausal bleeding.  States she has not had a full cycle since 2023 and has recently had monthly spotting and cramping over the past 3-4 months.  She denies any discharge or odor. Pt had a pap yesterday with PCP.         Past Medical History:   Diagnosis Date    Abnormal vaginal bleeding     Anxiety     Constipation     Hypertension     Medial meniscus tear     right knee    Ovarian cyst     Torn ACL     right knee       Past Surgical History:   Procedure Laterality Date    INCISION  "AND DRAINAGE Left     leg left    LAPAROSCOPIC CHOLECYSTECTOMY WITH CHOLANGIOGRAPHY N/A 2023    Procedure: CHOLECYSTECTOMY, LAPAROSCOPIC, WITH CHOLANGIOGRAM;  Surgeon: Prabhu Hernandes MD;  Location: [facility];  Service: General;  Laterality: N/A;    TUBAL LIGATION         Family History   Problem Relation Name Age of Onset    Breast cancer Maternal Grandmother Grandmother         onset unknown    Diabetes Father Sawyer pepe     Hypertension Father Sawyer pepe     Heart disease Father Sawyer pepe     Skin cancer Father Saywer pepe     Clotting disorder Mother      Ovarian cancer Sister Irina Choe         "early 30's"       OB History          2    Para   2    Term   2            AB        Living   2         SAB        IAB        Ectopic        Multiple        Live Births   2                 Current Outpatient Medications on File Prior to Visit   Medication Sig Dispense Refill    atorvastatin (LIPITOR) 40 MG tablet Take 1 tablet (40 mg total) by mouth every evening. 90 tablet 3    blood sugar diagnostic Strp To check BG one times daily, to use with insurance preferred meter 120 each 11    blood-glucose meter kit To check BG one times daily, to use with insurance preferred meter 1 each 0    clonazePAM (KLONOPIN) 1 MG tablet Take 1 tablet (1 mg total) by mouth 2 (two) times daily. as needed for anxiety. 60 tablet 0    clotrimazole (LOTRIMIN) 1 % cream Apply topically 2 (two) times daily. 28 g 1    cyclobenzaprine (FLEXERIL) 10 MG tablet Take 1 tablet (10 mg total) by mouth 3 (three) times daily as needed for Muscle spasms. 45 tablet 0    doxycycline (VIBRAMYCIN) 100 MG Cap Take 1 capsule (100 mg total) by mouth 2 (two) times daily. for 14 days 28 capsule 0    FLUoxetine 40 MG capsule Take 1 capsule (40 mg total) by mouth every morning. 30 capsule 11    ibuprofen (ADVIL,MOTRIN) 800 MG tablet Take 1 tablet (800 mg total) by mouth 3 (three) times daily. Take after eating, 6am, 2pm, 10pm. 180 " "tablet 2    lancets Misc To check BG one times daily, to use with insurance preferred meter 120 each 11    metFORMIN (GLUCOPHAGE) 1000 MG tablet Take 1 tablet (1,000 mg total) by mouth 2 (two) times daily with meals. 180 tablet 3    metoprolol succinate (TOPROL-XL) 25 MG 24 hr tablet Take 1 tablet (25 mg total) by mouth once daily. 30 tablet 3    metroNIDAZOLE (FLAGYL) 500 MG tablet Take 1 tablet (500 mg total) by mouth every 12 (twelve) hours. 14 tablet 0    mupirocin (BACTROBAN) 2 % ointment Apply topically 3 (three) times daily. 22 g 1    ondansetron (ZOFRAN-ODT) 8 MG TbDL Take 1 tablet (8 mg total) by mouth every 6 (six) hours as needed. 45 tablet 0    OZEMPIC 0.25 mg or 0.5 mg (2 mg/3 mL) pen injector Inject 0.5 mg into the skin every 7 days. 3 mL 3    gabapentin (NEURONTIN) 100 MG capsule Take 1 capsule (100 mg total) by mouth 3 (three) times daily. for 7 days 21 capsule 0     Current Facility-Administered Medications on File Prior to Visit   Medication Dose Route Frequency Provider Last Rate Last Admin    lactated ringers infusion   Intravenous Continuous Oracio Patino DO 10 mL/hr at 12/28/23 1242 New Bag at 12/28/23 1242       Review of Systems:       Review of Systems     Physical Exam:    /84 (BP Location: Left arm, Patient Position: Sitting)   Ht 5' 6" (1.676 m)   Wt 106.6 kg (235 lb)   BMI 37.93 kg/m²     Physical Exam       Assessment:   1. PMB (postmenopausal bleeding)    2. Thickened endometrium    3. Carrier of ureaplasma urealyticum    4. BMI 37.0-37.9, adult             Plan:   1. PMB (postmenopausal bleeding)    2. Thickened endometrium    3. Carrier of ureaplasma urealyticum    4. BMI 37.0-37.9, adult        This note was transcribed by Jamila Mack. There may be transcription errors as a result, however minimal. Effort has been made to ensure accuracy of transcription, but any obvious errors or omissions should be clarified with the author of the document.       "

## 2024-08-13 DIAGNOSIS — S89.91XD INJURY OF POSTERIOR CRUCIATE LIGAMENT OF RIGHT KNEE, SUBSEQUENT ENCOUNTER: ICD-10-CM

## 2024-08-13 DIAGNOSIS — S83.511D RUPTURE OF ANTERIOR CRUCIATE LIGAMENT OF RIGHT KNEE, SUBSEQUENT ENCOUNTER: Primary | ICD-10-CM

## 2024-08-13 NOTE — PROGRESS NOTES
Faculty Attestation: Raquel Byrne  was seen at [facility] in the Orthopaedic Clinic. History of Present Illness, Physical Exam, and Assessment and Plan reviewed. Treatment plan is reasonable and appropriate. Compliance with treatment recommendations is important. Discussed with the resident at the time of the visit.  No procedure was performed.     Mu Gray Jr, MD  Orthopaedic Surgery

## 2024-08-15 ENCOUNTER — OFFICE VISIT (OUTPATIENT)
Dept: OBSTETRICS AND GYNECOLOGY | Facility: CLINIC | Age: 54
End: 2024-08-15
Payer: MEDICAID

## 2024-08-15 VITALS
BODY MASS INDEX: 37.77 KG/M2 | SYSTOLIC BLOOD PRESSURE: 136 MMHG | HEIGHT: 66 IN | WEIGHT: 235 LBS | DIASTOLIC BLOOD PRESSURE: 84 MMHG

## 2024-08-15 DIAGNOSIS — R93.89 THICKENED ENDOMETRIUM: ICD-10-CM

## 2024-08-15 DIAGNOSIS — Z22.39 CARRIER OF UREAPLASMA UREALYTICUM: ICD-10-CM

## 2024-08-15 DIAGNOSIS — N95.0 PMB (POSTMENOPAUSAL BLEEDING): Primary | ICD-10-CM

## 2024-08-15 PROCEDURE — 3008F BODY MASS INDEX DOCD: CPT | Mod: CPTII,,, | Performed by: OBSTETRICS & GYNECOLOGY

## 2024-08-15 PROCEDURE — 3061F NEG MICROALBUMINURIA REV: CPT | Mod: CPTII,,, | Performed by: OBSTETRICS & GYNECOLOGY

## 2024-08-15 PROCEDURE — 3075F SYST BP GE 130 - 139MM HG: CPT | Mod: CPTII,,, | Performed by: OBSTETRICS & GYNECOLOGY

## 2024-08-15 PROCEDURE — 3066F NEPHROPATHY DOC TX: CPT | Mod: CPTII,,, | Performed by: OBSTETRICS & GYNECOLOGY

## 2024-08-15 PROCEDURE — 3079F DIAST BP 80-89 MM HG: CPT | Mod: CPTII,,, | Performed by: OBSTETRICS & GYNECOLOGY

## 2024-08-15 PROCEDURE — 1159F MED LIST DOCD IN RCRD: CPT | Mod: CPTII,,, | Performed by: OBSTETRICS & GYNECOLOGY

## 2024-08-15 PROCEDURE — 99214 OFFICE O/P EST MOD 30 MIN: CPT | Mod: ,,, | Performed by: OBSTETRICS & GYNECOLOGY

## 2024-08-15 PROCEDURE — 3051F HG A1C>EQUAL 7.0%<8.0%: CPT | Mod: CPTII,,, | Performed by: OBSTETRICS & GYNECOLOGY

## 2024-08-15 RX ORDER — FAMOTIDINE 20 MG/1
20 TABLET, FILM COATED ORAL
OUTPATIENT
Start: 2024-08-15

## 2024-08-15 RX ORDER — MUPIROCIN 20 MG/G
OINTMENT TOPICAL
OUTPATIENT
Start: 2024-08-15

## 2024-08-15 RX ORDER — SODIUM CHLORIDE 9 MG/ML
INJECTION, SOLUTION INTRAVENOUS CONTINUOUS
OUTPATIENT
Start: 2024-08-15

## 2024-08-15 RX ORDER — CEFAZOLIN SODIUM 2 G/50ML
2 SOLUTION INTRAVENOUS
OUTPATIENT
Start: 2024-08-15

## 2024-08-15 NOTE — PROGRESS NOTES
History & Physical    Subjective     History of Present Illness:  Patient is a 54 y.o.  with recent episode of PMB presents today to review pelvic u/s, labs, cultures.  Reports no bleeding in the last month.      Treated for Ureaplasma on 24     Gyn History:     Menstrual History  Cycle: No  Menarche Age: 11 years     Menopause  Menopause Age: 1 years  Post Menopausal Bleeding: No  Hormone Replacement Therapy: No     Pap History  Last pap date: 24  Result: Normal (NIL, Negative HPV/GC/CZ/TV)  History of Abnormal Pap: No  HPV Vaccine Completed: No (0/3)     [place]  Sexually Active: No  STI History: No  Contraception: Yes  Contraception Type: Tubal ligation/salpingectony     Breast History  Last Breast Imaging Date: Yes  Date: 24 (Result pending 8/15/24)  History of Abnormal Breast Imaging : No  History of Breast Biopsy: No     24 pelvic u/s:  FINDINGS:  No prior pelvic ultrasound is available for comparison.  There is a submucosal 15 mm anterior wall leiomyoma.  The endometrial stripe measures 10 mm.  The uterus measures 76 x 47 x 58 mm.  Color Doppler is documented in both ovaries.  The right ovary measures 21 x 7 x 12 mm.  The left ovary measures 23 x 10 x 18 mm.  There is no adnexal mass or free fluid.  Impression:  1. Anterior wall submucosal leiomyoma.  2. The endometrial stripe is thickened.     Per previous note 24;  Raquel Byrne 54 y.o. White female  presents to clinic as a referral from Renee Love NP for postmenopausal bleeding.  States she has not had a full cycle since 2023 and has recently had monthly spotting and cramping over the past 3-4 months.  She denies any discharge or odor. Pt had a pap yesterday with PCP.       Chief Complaint   Patient presents with    Follow-up       Review of patient's allergies indicates:   Allergen Reactions    Latex, natural rubber Hives, Itching and Swelling       Current Outpatient Medications   Medication Sig  Dispense Refill    atorvastatin (LIPITOR) 40 MG tablet Take 1 tablet (40 mg total) by mouth every evening. 90 tablet 3    blood sugar diagnostic Strp To check BG one times daily, to use with insurance preferred meter 120 each 11    blood-glucose meter kit To check BG one times daily, to use with insurance preferred meter 1 each 0    clonazePAM (KLONOPIN) 1 MG tablet Take 1 tablet (1 mg total) by mouth 2 (two) times daily. as needed for anxiety. 60 tablet 0    clotrimazole (LOTRIMIN) 1 % cream Apply topically 2 (two) times daily. 28 g 1    cyclobenzaprine (FLEXERIL) 10 MG tablet Take 1 tablet (10 mg total) by mouth 3 (three) times daily as needed for Muscle spasms. 45 tablet 0    doxycycline (VIBRAMYCIN) 100 MG Cap Take 1 capsule (100 mg total) by mouth 2 (two) times daily. for 14 days 28 capsule 0    FLUoxetine 40 MG capsule Take 1 capsule (40 mg total) by mouth every morning. 30 capsule 11    ibuprofen (ADVIL,MOTRIN) 800 MG tablet Take 1 tablet (800 mg total) by mouth 3 (three) times daily. Take after eating, 6am, 2pm, 10pm. 180 tablet 2    lancets Misc To check BG one times daily, to use with insurance preferred meter 120 each 11    metFORMIN (GLUCOPHAGE) 1000 MG tablet Take 1 tablet (1,000 mg total) by mouth 2 (two) times daily with meals. 180 tablet 3    metoprolol succinate (TOPROL-XL) 25 MG 24 hr tablet Take 1 tablet (25 mg total) by mouth once daily. 30 tablet 3    metroNIDAZOLE (FLAGYL) 500 MG tablet Take 1 tablet (500 mg total) by mouth every 12 (twelve) hours. 14 tablet 0    mupirocin (BACTROBAN) 2 % ointment Apply topically 3 (three) times daily. 22 g 1    ondansetron (ZOFRAN-ODT) 8 MG TbDL Take 1 tablet (8 mg total) by mouth every 6 (six) hours as needed. 45 tablet 0    OZEMPIC 0.25 mg or 0.5 mg (2 mg/3 mL) pen injector Inject 0.5 mg into the skin every 7 days. 3 mL 3    gabapentin (NEURONTIN) 100 MG capsule Take 1 capsule (100 mg total) by mouth 3 (three) times daily. for 7 days 21 capsule 0     No  "current facility-administered medications for this visit.     Facility-Administered Medications Ordered in Other Visits   Medication Dose Route Frequency Provider Last Rate Last Admin    lactated ringers infusion   Intravenous Continuous Oracio Patino DO 10 mL/hr at 12/28/23 1242 New Bag at 12/28/23 1242       Past Medical History:   Diagnosis Date    Abnormal vaginal bleeding     Anxiety     Constipation     Hypertension     Medial meniscus tear     right knee    Ovarian cyst     Torn ACL     right knee     Past Surgical History:   Procedure Laterality Date    INCISION AND DRAINAGE Left     leg left    LAPAROSCOPIC CHOLECYSTECTOMY WITH CHOLANGIOGRAPHY N/A 12/28/2023    Procedure: CHOLECYSTECTOMY, LAPAROSCOPIC, WITH CHOLANGIOGRAM;  Surgeon: Prabhu Hernandes MD;  Location: [facility];  Service: General;  Laterality: N/A;    TUBAL LIGATION       Family History   Problem Relation Name Age of Onset    Breast cancer Maternal Grandmother Grandmother         onset unknown    Diabetes Father Sawyer pepe     Hypertension Father Sawyer pepe     Heart disease Father Sawyer pepe     Skin cancer Father Sawyer pepe     Clotting disorder Mother      Ovarian cancer Sister Irina Choe         "early 30's"     Social History     Tobacco Use    Smoking status: Some Days     Current packs/day: 0.00     Average packs/day: 0.3 packs/day for 23.0 years (5.8 ttl pk-yrs)     Types: Cigarettes, Vaping with nicotine     Start date: 1/1/1991     Last attempt to quit: 2014     Years since quitting: 10.6    Smokeless tobacco: Never   Substance Use Topics    Alcohol use: Never    Drug use: Not Currently     Types: Marijuana     Comment: Medical        Review of Systems:  Review of Systems   Respiratory: Negative.     Cardiovascular: Negative.    Gastrointestinal: Negative.    Genitourinary:  Positive for vaginal bleeding.          Objective     Vital Signs (Most Recent)  BP: 136/84 (08/15/24 1426)  5' 6" (1.676 m)  106.6 kg (235 lb) "     Physical Exam:  Physical Exam  Physical Exam  Gen: NAD  Cardio: RRR  Lungs CTA b/l  Abd: Soft, NT  Ext: no CCE         Assessment and Plan   1. PMB (postmenopausal bleeding)  - Case Request Operating Room: HYSTEROSCOPY, WITH DILATION AND CURETTAGE OF UTERUS  - Full code; Standing  - Vital signs; Standing  - Insert peripheral IV; Standing  - Nguyen to Gravity; Standing  - POCT glucose; Standing  - Notify physician if BS > 180 for hysterectomy patients; Standing  - Chlorhexidine (CHG) 2% Wipes; Standing  - Notify Physician/Vital Signs Parameters Urine output less than 0.5mL/kg/hr (with indwelling catheter) or 30 mL/hr (without indwelling catheter) or blood glucose greater than 200 mg/dL; Standing  - Notify physician; Standing  - Notify Physician - Potential Need of Opioid Reversal; Standing  - Diet NPO; Standing  - Chlorohexidine Gluconate Bath; Standing  - Place in Outpatient; Standing  - Place sequential compression device; Standing  - Comprehensive metabolic panel; Standing  - CBC auto differential; Standing  - Hemoglobin A1C; Standing  - Pregnancy, urine rapid; Standing  - Urinalysis, Reflex to Urine Culture; Standing  - Type & Screen Pre Op; Standing  - EKG 12-lead; Standing    2. Thickened endometrium  - Case Request Operating Room: HYSTEROSCOPY, WITH DILATION AND CURETTAGE OF UTERUS  - Full code; Standing  - Vital signs; Standing  - Insert peripheral IV; Standing  - Nguyen to Gravity; Standing  - POCT glucose; Standing  - Notify physician if BS > 180 for hysterectomy patients; Standing  - Chlorhexidine (CHG) 2% Wipes; Standing  - Notify Physician/Vital Signs Parameters Urine output less than 0.5mL/kg/hr (with indwelling catheter) or 30 mL/hr (without indwelling catheter) or blood glucose greater than 200 mg/dL; Standing  - Notify physician; Standing  - Notify Physician - Potential Need of Opioid Reversal; Standing  - Diet NPO; Standing  - Chlorohexidine Gluconate Bath; Standing  - Place in Outpatient;  Standing  - Place sequential compression device; Standing  - Comprehensive metabolic panel; Standing  - CBC auto differential; Standing  - Hemoglobin A1C; Standing  - Pregnancy, urine rapid; Standing  - Urinalysis, Reflex to Urine Culture; Standing  - Type & Screen Pre Op; Standing  - EKG 12-lead; Standing    3. Carrier of ureaplasma urealyticum  - Case Request Operating Room: HYSTEROSCOPY, WITH DILATION AND CURETTAGE OF UTERUS    4. BMI 37.0-37.9, adult  - Case Request Operating Room: HYSTEROSCOPY, WITH DILATION AND CURETTAGE OF UTERUS  - Full code; Standing  - Vital signs; Standing  - Insert peripheral IV; Standing  - Nguyen to Gravity; Standing  - POCT glucose; Standing  - Notify physician if BS > 180 for hysterectomy patients; Standing  - Chlorhexidine (CHG) 2% Wipes; Standing  - Notify Physician/Vital Signs Parameters Urine output less than 0.5mL/kg/hr (with indwelling catheter) or 30 mL/hr (without indwelling catheter) or blood glucose greater than 200 mg/dL; Standing  - Notify physician; Standing  - Notify Physician - Potential Need of Opioid Reversal; Standing  - Diet NPO; Standing  - Chlorohexidine Gluconate Bath; Standing  - Place in Outpatient; Standing  - Place sequential compression device; Standing  - Comprehensive metabolic panel; Standing  - CBC auto differential; Standing  - Hemoglobin A1C; Standing  - Pregnancy, urine rapid; Standing  - Urinalysis, Reflex to Urine Culture; Standing  - Type & Screen Pre Op; Standing  - EKG 12-lead; Standing        PLAN:  .   Reviewed pelvic u/s with patient. Discussed PMB, thickened endometrium with patient.    Recommend endometrial sampling to rule out cancer prior to treatment.   Discussed office endometrial biopsy and hysteroscope with dilation and curettage and associated limitations, risks, and benefits of each procedure.   Patient elects hysteroscope with dilation and curettage in the OR  Plan for procedure on 8/28/24  Consent given for procedure    RTC post op      This note was transcribed by Jamila Mack. There may be transcription errors as a result, however minimal. Effort has been made to ensure accuracy of transcription, but any obvious errors or omissions should be clarified with the author of the document.

## 2024-08-15 NOTE — H&P (VIEW-ONLY)
History & Physical    Subjective     History of Present Illness:  Patient is a 54 y.o.  with recent episode of PMB presents today to review pelvic u/s, labs, cultures.  Reports no bleeding in the last month.      Treated for Ureaplasma on 24     Gyn History:     Menstrual History  Cycle: No  Menarche Age: 11 years     Menopause  Menopause Age: 1 years  Post Menopausal Bleeding: No  Hormone Replacement Therapy: No     Pap History  Last pap date: 24  Result: Normal (NIL, Negative HPV/GC/CZ/TV)  History of Abnormal Pap: No  HPV Vaccine Completed: No (0/3)     [place]  Sexually Active: No  STI History: No  Contraception: Yes  Contraception Type: Tubal ligation/salpingectony     Breast History  Last Breast Imaging Date: Yes  Date: 24 (Result pending 8/15/24)  History of Abnormal Breast Imaging : No  History of Breast Biopsy: No     24 pelvic u/s:  FINDINGS:  No prior pelvic ultrasound is available for comparison.  There is a submucosal 15 mm anterior wall leiomyoma.  The endometrial stripe measures 10 mm.  The uterus measures 76 x 47 x 58 mm.  Color Doppler is documented in both ovaries.  The right ovary measures 21 x 7 x 12 mm.  The left ovary measures 23 x 10 x 18 mm.  There is no adnexal mass or free fluid.  Impression:  1. Anterior wall submucosal leiomyoma.  2. The endometrial stripe is thickened.     Per previous note 24;  Raquel Byrne 54 y.o. White female  presents to clinic as a referral from Renee Love NP for postmenopausal bleeding.  States she has not had a full cycle since 2023 and has recently had monthly spotting and cramping over the past 3-4 months.  She denies any discharge or odor. Pt had a pap yesterday with PCP.       Chief Complaint   Patient presents with    Follow-up       Review of patient's allergies indicates:   Allergen Reactions    Latex, natural rubber Hives, Itching and Swelling       Current Outpatient Medications   Medication Sig  Dispense Refill    atorvastatin (LIPITOR) 40 MG tablet Take 1 tablet (40 mg total) by mouth every evening. 90 tablet 3    blood sugar diagnostic Strp To check BG one times daily, to use with insurance preferred meter 120 each 11    blood-glucose meter kit To check BG one times daily, to use with insurance preferred meter 1 each 0    clonazePAM (KLONOPIN) 1 MG tablet Take 1 tablet (1 mg total) by mouth 2 (two) times daily. as needed for anxiety. 60 tablet 0    clotrimazole (LOTRIMIN) 1 % cream Apply topically 2 (two) times daily. 28 g 1    cyclobenzaprine (FLEXERIL) 10 MG tablet Take 1 tablet (10 mg total) by mouth 3 (three) times daily as needed for Muscle spasms. 45 tablet 0    doxycycline (VIBRAMYCIN) 100 MG Cap Take 1 capsule (100 mg total) by mouth 2 (two) times daily. for 14 days 28 capsule 0    FLUoxetine 40 MG capsule Take 1 capsule (40 mg total) by mouth every morning. 30 capsule 11    ibuprofen (ADVIL,MOTRIN) 800 MG tablet Take 1 tablet (800 mg total) by mouth 3 (three) times daily. Take after eating, 6am, 2pm, 10pm. 180 tablet 2    lancets Misc To check BG one times daily, to use with insurance preferred meter 120 each 11    metFORMIN (GLUCOPHAGE) 1000 MG tablet Take 1 tablet (1,000 mg total) by mouth 2 (two) times daily with meals. 180 tablet 3    metoprolol succinate (TOPROL-XL) 25 MG 24 hr tablet Take 1 tablet (25 mg total) by mouth once daily. 30 tablet 3    metroNIDAZOLE (FLAGYL) 500 MG tablet Take 1 tablet (500 mg total) by mouth every 12 (twelve) hours. 14 tablet 0    mupirocin (BACTROBAN) 2 % ointment Apply topically 3 (three) times daily. 22 g 1    ondansetron (ZOFRAN-ODT) 8 MG TbDL Take 1 tablet (8 mg total) by mouth every 6 (six) hours as needed. 45 tablet 0    OZEMPIC 0.25 mg or 0.5 mg (2 mg/3 mL) pen injector Inject 0.5 mg into the skin every 7 days. 3 mL 3    gabapentin (NEURONTIN) 100 MG capsule Take 1 capsule (100 mg total) by mouth 3 (three) times daily. for 7 days 21 capsule 0     No  "current facility-administered medications for this visit.     Facility-Administered Medications Ordered in Other Visits   Medication Dose Route Frequency Provider Last Rate Last Admin    lactated ringers infusion   Intravenous Continuous Oracio Patino DO 10 mL/hr at 12/28/23 1242 New Bag at 12/28/23 1242       Past Medical History:   Diagnosis Date    Abnormal vaginal bleeding     Anxiety     Constipation     Hypertension     Medial meniscus tear     right knee    Ovarian cyst     Torn ACL     right knee     Past Surgical History:   Procedure Laterality Date    INCISION AND DRAINAGE Left     leg left    LAPAROSCOPIC CHOLECYSTECTOMY WITH CHOLANGIOGRAPHY N/A 12/28/2023    Procedure: CHOLECYSTECTOMY, LAPAROSCOPIC, WITH CHOLANGIOGRAM;  Surgeon: Prabhu Hernandes MD;  [facility];  Service: General;  Laterality: N/A;    TUBAL LIGATION       Family History   Problem Relation Name Age of Onset    Breast cancer Maternal Grandmother Grandmother         onset unknown    Diabetes Father Sawyer pepe     Hypertension Father Sawyer pepe     Heart disease Father Sawyer pepe     Skin cancer Father Sawyer pepe     Clotting disorder Mother      Ovarian cancer Sister Irina Choe         "early 30's"     Social History     Tobacco Use    Smoking status: Some Days     Current packs/day: 0.00     Average packs/day: 0.3 packs/day for 23.0 years (5.8 ttl pk-yrs)     Types: Cigarettes, Vaping with nicotine     Start date: 1/1/1991     Last attempt to quit: 2014     Years since quitting: 10.6    Smokeless tobacco: Never   Substance Use Topics    Alcohol use: Never    Drug use: Not Currently     Types: Marijuana     Comment: Medical        Review of Systems:  Review of Systems   Respiratory: Negative.     Cardiovascular: Negative.    Gastrointestinal: Negative.    Genitourinary:  Positive for vaginal bleeding.          Objective     Vital Signs (Most Recent)  BP: 136/84 (08/15/24 1426)  5' 6" (1.676 m)  106.6 kg (235 lb) "     Physical Exam:  Physical Exam  Physical Exam  Gen: NAD  Cardio: RRR  Lungs CTA b/l  Abd: Soft, NT  Ext: no CCE         Assessment and Plan   1. PMB (postmenopausal bleeding)  - Case Request Operating Room: HYSTEROSCOPY, WITH DILATION AND CURETTAGE OF UTERUS  - Full code; Standing  - Vital signs; Standing  - Insert peripheral IV; Standing  - Nguyen to Gravity; Standing  - POCT glucose; Standing  - Notify physician if BS > 180 for hysterectomy patients; Standing  - Chlorhexidine (CHG) 2% Wipes; Standing  - Notify Physician/Vital Signs Parameters Urine output less than 0.5mL/kg/hr (with indwelling catheter) or 30 mL/hr (without indwelling catheter) or blood glucose greater than 200 mg/dL; Standing  - Notify physician; Standing  - Notify Physician - Potential Need of Opioid Reversal; Standing  - Diet NPO; Standing  - Chlorohexidine Gluconate Bath; Standing  - Place in Outpatient; Standing  - Place sequential compression device; Standing  - Comprehensive metabolic panel; Standing  - CBC auto differential; Standing  - Hemoglobin A1C; Standing  - Pregnancy, urine rapid; Standing  - Urinalysis, Reflex to Urine Culture; Standing  - Type & Screen Pre Op; Standing  - EKG 12-lead; Standing    2. Thickened endometrium  - Case Request Operating Room: HYSTEROSCOPY, WITH DILATION AND CURETTAGE OF UTERUS  - Full code; Standing  - Vital signs; Standing  - Insert peripheral IV; Standing  - Nguyen to Gravity; Standing  - POCT glucose; Standing  - Notify physician if BS > 180 for hysterectomy patients; Standing  - Chlorhexidine (CHG) 2% Wipes; Standing  - Notify Physician/Vital Signs Parameters Urine output less than 0.5mL/kg/hr (with indwelling catheter) or 30 mL/hr (without indwelling catheter) or blood glucose greater than 200 mg/dL; Standing  - Notify physician; Standing  - Notify Physician - Potential Need of Opioid Reversal; Standing  - Diet NPO; Standing  - Chlorohexidine Gluconate Bath; Standing  - Place in Outpatient;  Standing  - Place sequential compression device; Standing  - Comprehensive metabolic panel; Standing  - CBC auto differential; Standing  - Hemoglobin A1C; Standing  - Pregnancy, urine rapid; Standing  - Urinalysis, Reflex to Urine Culture; Standing  - Type & Screen Pre Op; Standing  - EKG 12-lead; Standing    3. Carrier of ureaplasma urealyticum  - Case Request Operating Room: HYSTEROSCOPY, WITH DILATION AND CURETTAGE OF UTERUS    4. BMI 37.0-37.9, adult  - Case Request Operating Room: HYSTEROSCOPY, WITH DILATION AND CURETTAGE OF UTERUS  - Full code; Standing  - Vital signs; Standing  - Insert peripheral IV; Standing  - Nguyen to Gravity; Standing  - POCT glucose; Standing  - Notify physician if BS > 180 for hysterectomy patients; Standing  - Chlorhexidine (CHG) 2% Wipes; Standing  - Notify Physician/Vital Signs Parameters Urine output less than 0.5mL/kg/hr (with indwelling catheter) or 30 mL/hr (without indwelling catheter) or blood glucose greater than 200 mg/dL; Standing  - Notify physician; Standing  - Notify Physician - Potential Need of Opioid Reversal; Standing  - Diet NPO; Standing  - Chlorohexidine Gluconate Bath; Standing  - Place in Outpatient; Standing  - Place sequential compression device; Standing  - Comprehensive metabolic panel; Standing  - CBC auto differential; Standing  - Hemoglobin A1C; Standing  - Pregnancy, urine rapid; Standing  - Urinalysis, Reflex to Urine Culture; Standing  - Type & Screen Pre Op; Standing  - EKG 12-lead; Standing        PLAN:  .   Reviewed pelvic u/s with patient. Discussed PMB, thickened endometrium with patient.    Recommend endometrial sampling to rule out cancer prior to treatment.   Discussed office endometrial biopsy and hysteroscope with dilation and curettage and associated limitations, risks, and benefits of each procedure.   Patient elects hysteroscope with dilation and curettage in the OR  Plan for procedure on 8/28/24  Consent given for procedure    RTC post op      This note was transcribed by Jamila Mack. There may be transcription errors as a result, however minimal. Effort has been made to ensure accuracy of transcription, but any obvious errors or omissions should be clarified with the author of the document.

## 2024-08-16 ENCOUNTER — PATIENT MESSAGE (OUTPATIENT)
Dept: FAMILY MEDICINE | Facility: CLINIC | Age: 54
End: 2024-08-16
Payer: MEDICAID

## 2024-08-20 ENCOUNTER — HOSPITAL ENCOUNTER (OUTPATIENT)
Dept: PREADMISSION TESTING | Facility: HOSPITAL | Age: 54
Discharge: HOME | End: 2024-08-20
Attending: OBSTETRICS & GYNECOLOGY
Payer: MEDICAID

## 2024-08-20 VITALS — HEIGHT: 66 IN | BODY MASS INDEX: 37.77 KG/M2 | WEIGHT: 235 LBS

## 2024-08-20 DIAGNOSIS — Z01.818 PRE-OP EVALUATION: ICD-10-CM

## 2024-08-20 DIAGNOSIS — R93.89 THICKENED ENDOMETRIUM: ICD-10-CM

## 2024-08-20 DIAGNOSIS — N95.0 PMB (POSTMENOPAUSAL BLEEDING): ICD-10-CM

## 2024-08-20 LAB
ALBUMIN SERPL-MCNC: 4.3 G/DL (ref 3.4–5)
ALBUMIN/GLOB SERPL: 1 RATIO
ALP SERPL-CCNC: 63 UNIT/L (ref 50–144)
ALT SERPL-CCNC: 80 UNIT/L (ref 1–45)
ANION GAP SERPL CALC-SCNC: 8 MEQ/L (ref 2–13)
AST SERPL-CCNC: 86 UNIT/L (ref 14–36)
BASOPHILS # BLD AUTO: 0.02 X10(3)/MCL (ref 0.01–0.08)
BASOPHILS NFR BLD AUTO: 0.5 % (ref 0.1–1.2)
BILIRUB SERPL-MCNC: 1.1 MG/DL (ref 0–1)
BILIRUB UR QL STRIP.AUTO: ABNORMAL
BUN SERPL-MCNC: 14 MG/DL (ref 7–20)
CALCIUM SERPL-MCNC: 9.8 MG/DL (ref 8.4–10.2)
CHLORIDE SERPL-SCNC: 109 MMOL/L (ref 98–110)
CLARITY UR: CLEAR
CO2 SERPL-SCNC: 22 MMOL/L (ref 21–32)
COLOR UR AUTO: YELLOW
CREAT SERPL-MCNC: 0.58 MG/DL (ref 0.66–1.25)
CREAT/UREA NIT SERPL: 24 (ref 12–20)
EOSINOPHIL # BLD AUTO: 0.15 X10(3)/MCL (ref 0.04–0.36)
EOSINOPHIL NFR BLD AUTO: 3.9 % (ref 0.7–7)
ERYTHROCYTE [DISTWIDTH] IN BLOOD BY AUTOMATED COUNT: 12.2 % (ref 11–14.5)
EST. AVERAGE GLUCOSE BLD GHB EST-MCNC: 168.6 MG/DL (ref 70–115)
GFR SERPLBLD CREATININE-BSD FMLA CKD-EPI: >90 ML/MIN/1.73/M2
GLOBULIN SER-MCNC: 4.1 GM/DL (ref 2–3.9)
GLUCOSE SERPL-MCNC: 126 MG/DL (ref 70–115)
GLUCOSE UR QL STRIP: NEGATIVE
HBA1C MFR BLD: 7.5 % (ref 4–6)
HCT VFR BLD AUTO: 40.5 % (ref 36–48)
HGB BLD-MCNC: 14 G/DL (ref 11.8–16)
HGB UR QL STRIP: NEGATIVE
IMM GRANULOCYTES # BLD AUTO: 0 X10(3)/MCL (ref 0–0.03)
IMM GRANULOCYTES NFR BLD AUTO: 0 % (ref 0–0.5)
KETONES UR QL STRIP: NEGATIVE
LEUKOCYTE ESTERASE UR QL STRIP: NEGATIVE
LYMPHOCYTES # BLD AUTO: 1.4 X10(3)/MCL (ref 1.16–3.74)
LYMPHOCYTES NFR BLD AUTO: 36.2 % (ref 20–55)
MCH RBC QN AUTO: 32.9 PG (ref 27–34)
MCHC RBC AUTO-ENTMCNC: 34.6 G/DL (ref 31–37)
MCV RBC AUTO: 95.1 FL (ref 79–99)
MONOCYTES # BLD AUTO: 0.35 X10(3)/MCL (ref 0.24–0.36)
MONOCYTES NFR BLD AUTO: 9 % (ref 4.7–12.5)
NEUTROPHILS # BLD AUTO: 1.95 X10(3)/MCL (ref 1.56–6.13)
NEUTROPHILS NFR BLD AUTO: 50.4 % (ref 37–73)
NITRITE UR QL STRIP: NEGATIVE
OHS QRS DURATION: 82 MS
OHS QTC CALCULATION: 440 MS
PH UR STRIP: 6.5 [PH]
PLATELET # BLD AUTO: 119 X10(3)/MCL (ref 140–371)
PMV BLD AUTO: 10.5 FL (ref 9.4–12.4)
POTASSIUM SERPL-SCNC: 4.2 MMOL/L (ref 3.5–5.1)
PROT SERPL-MCNC: 8.4 GM/DL (ref 6.3–8.2)
PROT UR QL STRIP: NEGATIVE
RBC # BLD AUTO: 4.26 X10(6)/MCL (ref 4–5.1)
SODIUM SERPL-SCNC: 139 MMOL/L (ref 136–145)
SP GR UR STRIP.AUTO: 1.02 (ref 1–1.03)
UROBILINOGEN UR STRIP-ACNC: 4
WBC # BLD AUTO: 3.87 X10(3)/MCL (ref 4–11.5)

## 2024-08-20 PROCEDURE — 85025 COMPLETE CBC W/AUTO DIFF WBC: CPT | Performed by: OBSTETRICS & GYNECOLOGY

## 2024-08-20 PROCEDURE — 93010 ELECTROCARDIOGRAM REPORT: CPT | Mod: ,,, | Performed by: INTERNAL MEDICINE

## 2024-08-20 PROCEDURE — 83036 HEMOGLOBIN GLYCOSYLATED A1C: CPT | Performed by: OBSTETRICS & GYNECOLOGY

## 2024-08-20 PROCEDURE — 93005 ELECTROCARDIOGRAM TRACING: CPT

## 2024-08-20 PROCEDURE — 80053 COMPREHEN METABOLIC PANEL: CPT | Performed by: OBSTETRICS & GYNECOLOGY

## 2024-08-20 PROCEDURE — 81003 URINALYSIS AUTO W/O SCOPE: CPT | Performed by: OBSTETRICS & GYNECOLOGY

## 2024-08-20 NOTE — DISCHARGE INSTRUCTIONS

## 2024-08-22 ENCOUNTER — PATIENT MESSAGE (OUTPATIENT)
Dept: FAMILY MEDICINE | Facility: CLINIC | Age: 54
End: 2024-08-22
Payer: MEDICAID

## 2024-08-22 DIAGNOSIS — R74.8 ELEVATED LIVER ENZYMES: Primary | ICD-10-CM

## 2024-08-23 ENCOUNTER — LAB VISIT (OUTPATIENT)
Dept: LAB | Facility: HOSPITAL | Age: 54
End: 2024-08-23
Attending: NURSE PRACTITIONER
Payer: MEDICAID

## 2024-08-23 DIAGNOSIS — N95.0 POSTMENOPAUSAL BLEEDING: ICD-10-CM

## 2024-08-23 DIAGNOSIS — R74.8 ELEVATED LIVER ENZYMES: ICD-10-CM

## 2024-08-23 LAB
ALBUMIN SERPL-MCNC: 3.6 G/DL (ref 3.5–5)
ALBUMIN/GLOB SERPL: 0.8 RATIO (ref 1.1–2)
ALP SERPL-CCNC: 64 UNIT/L (ref 40–150)
ALT SERPL-CCNC: 66 UNIT/L (ref 0–55)
ANION GAP SERPL CALC-SCNC: 8 MEQ/L
AST SERPL-CCNC: 58 UNIT/L (ref 5–34)
BILIRUB SERPL-MCNC: 0.8 MG/DL
BUN SERPL-MCNC: 8 MG/DL (ref 9.8–20.1)
CALCIUM SERPL-MCNC: 9.4 MG/DL (ref 8.4–10.2)
CHLORIDE SERPL-SCNC: 107 MMOL/L (ref 98–107)
CO2 SERPL-SCNC: 23 MMOL/L (ref 22–29)
CREAT SERPL-MCNC: 0.79 MG/DL (ref 0.55–1.02)
CREAT/UREA NIT SERPL: 10
GFR SERPLBLD CREATININE-BSD FMLA CKD-EPI: >60 ML/MIN/1.73/M2
GLOBULIN SER-MCNC: 4.3 GM/DL (ref 2.4–3.5)
GLUCOSE SERPL-MCNC: 97 MG/DL (ref 74–100)
POTASSIUM SERPL-SCNC: 3.7 MMOL/L (ref 3.5–5.1)
PROT SERPL-MCNC: 7.9 GM/DL (ref 6.4–8.3)
SODIUM SERPL-SCNC: 138 MMOL/L (ref 136–145)

## 2024-08-23 PROCEDURE — 80053 COMPREHEN METABOLIC PANEL: CPT

## 2024-08-23 PROCEDURE — 36415 COLL VENOUS BLD VENIPUNCTURE: CPT

## 2024-08-26 ENCOUNTER — OFFICE VISIT (OUTPATIENT)
Dept: FAMILY MEDICINE | Facility: CLINIC | Age: 54
End: 2024-08-26
Payer: MEDICAID

## 2024-08-26 VITALS
OXYGEN SATURATION: 98 % | BODY MASS INDEX: 37.77 KG/M2 | RESPIRATION RATE: 18 BRPM | TEMPERATURE: 98 F | WEIGHT: 235 LBS | HEART RATE: 76 BPM | SYSTOLIC BLOOD PRESSURE: 139 MMHG | HEIGHT: 66 IN | DIASTOLIC BLOOD PRESSURE: 88 MMHG

## 2024-08-26 DIAGNOSIS — R74.8 ELEVATED LIVER ENZYMES: ICD-10-CM

## 2024-08-26 DIAGNOSIS — F41.9 ANXIETY: ICD-10-CM

## 2024-08-26 DIAGNOSIS — R45.86 REBOUND MOOD SWINGS: ICD-10-CM

## 2024-08-26 DIAGNOSIS — G43.E09 CHRONIC MIGRAINE WITH AURA WITHOUT STATUS MIGRAINOSUS, NOT INTRACTABLE: Primary | ICD-10-CM

## 2024-08-26 PROBLEM — S83.511A RUPTURE OF ANTERIOR CRUCIATE LIGAMENT OF RIGHT KNEE: Status: ACTIVE | Noted: 2024-08-26

## 2024-08-26 PROCEDURE — 3051F HG A1C>EQUAL 7.0%<8.0%: CPT | Mod: CPTII,,, | Performed by: NURSE PRACTITIONER

## 2024-08-26 PROCEDURE — 3008F BODY MASS INDEX DOCD: CPT | Mod: CPTII,,, | Performed by: NURSE PRACTITIONER

## 2024-08-26 PROCEDURE — 99215 OFFICE O/P EST HI 40 MIN: CPT | Mod: PBBFAC | Performed by: NURSE PRACTITIONER

## 2024-08-26 PROCEDURE — 1160F RVW MEDS BY RX/DR IN RCRD: CPT | Mod: CPTII,,, | Performed by: NURSE PRACTITIONER

## 2024-08-26 PROCEDURE — 3061F NEG MICROALBUMINURIA REV: CPT | Mod: CPTII,,, | Performed by: NURSE PRACTITIONER

## 2024-08-26 PROCEDURE — 3066F NEPHROPATHY DOC TX: CPT | Mod: CPTII,,, | Performed by: NURSE PRACTITIONER

## 2024-08-26 PROCEDURE — 1159F MED LIST DOCD IN RCRD: CPT | Mod: CPTII,,, | Performed by: NURSE PRACTITIONER

## 2024-08-26 PROCEDURE — 3075F SYST BP GE 130 - 139MM HG: CPT | Mod: CPTII,,, | Performed by: NURSE PRACTITIONER

## 2024-08-26 PROCEDURE — 99214 OFFICE O/P EST MOD 30 MIN: CPT | Mod: S$PBB,,, | Performed by: NURSE PRACTITIONER

## 2024-08-26 PROCEDURE — 3079F DIAST BP 80-89 MM HG: CPT | Mod: CPTII,,, | Performed by: NURSE PRACTITIONER

## 2024-08-26 PROCEDURE — 99999 PR PBB SHADOW E&M-EST. PATIENT-LVL V: CPT | Mod: PBBFAC,,, | Performed by: NURSE PRACTITIONER

## 2024-08-26 RX ORDER — SUMATRIPTAN SUCCINATE 100 MG/1
100 TABLET ORAL
Qty: 9 TABLET | Refills: 3 | Status: SHIPPED | OUTPATIENT
Start: 2024-08-26

## 2024-08-26 RX ORDER — CITALOPRAM 10 MG/1
10 TABLET ORAL DAILY
Qty: 30 TABLET | Refills: 11 | Status: SHIPPED | OUTPATIENT
Start: 2024-08-26 | End: 2025-08-26

## 2024-08-26 NOTE — PROGRESS NOTES
Family Medicine    Patient ID: [id number]     Chief Complaint: Results      HPI:     Raquel Byrne is here today for f/u for elevated ast and alt, we are waiting for GGT, patient notes she does not drink ETOH, numbers have improved from 8/20 was 86 and 80 and is now 58 and 66 on 8/23/24.    Past Medical History:   Diagnosis Date    Abnormal vaginal bleeding     Anxiety     Constipation     Hypertension     Medial meniscus tear     right knee    Ovarian cyst     Torn ACL     right knee        Past Surgical History:   Procedure Laterality Date    INCISION AND DRAINAGE Left     leg left    LAPAROSCOPIC CHOLECYSTECTOMY WITH CHOLANGIOGRAPHY N/A 12/28/2023    Procedure: CHOLECYSTECTOMY, LAPAROSCOPIC, WITH CHOLANGIOGRAM;  Surgeon: Prabhu Hernandes MD;  Location: [facility];  Service: General;  Laterality: N/A;    TUBAL LIGATION          Social History     Tobacco Use    Smoking status: Former     Current packs/day: 0.00     Average packs/day: 0.3 packs/day for 23.0 years (5.8 ttl pk-yrs)     Types: Cigarettes     Start date: 1/1/1991     Quit date: 2014     Years since quitting: 10.6    Smokeless tobacco: Never   Substance and Sexual Activity    Alcohol use: Never    Drug use: Not Currently     Types: Marijuana     Comment: Medical    Sexual activity: Not Currently     Partners: Male     Birth control/protection: See Surgical Hx        Current Outpatient Medications   Medication Instructions    atorvastatin (LIPITOR) 40 mg, Oral, Nightly    blood sugar diagnostic Strp To check BG one times daily, to use with insurance preferred meter    blood-glucose meter kit To check BG one times daily, to use with insurance preferred meter    citalopram (CELEXA) 10 mg, Oral, Daily    clonazePAM (KLONOPIN) 1 mg, Oral, 2 times daily, as needed for anxiety.    clotrimazole (LOTRIMIN) 1 % cream Topical (Top), 2 times daily    cyclobenzaprine (FLEXERIL) 10 mg, Oral, 3 times daily PRN    gabapentin (NEURONTIN) 100 mg, Oral, 3 times daily     ibuprofen (ADVIL,MOTRIN) 800 mg, Oral, 3 times daily, Take after eating, 6am, 2pm, 10pm.    lancets Misc To check BG one times daily, to use with insurance preferred meter    metFORMIN (GLUCOPHAGE) 1,000 mg, Oral, 2 times daily with meals    metoprolol succinate (TOPROL-XL) 25 mg, Oral, Daily    metroNIDAZOLE (FLAGYL) 500 mg, Oral, Every 12 hours    mupirocin (BACTROBAN) 2 % ointment Topical (Top), 3 times daily    ondansetron (ZOFRAN-ODT) 8 mg, Oral, Every 6 hours PRN    OZEMPIC 0.5 mg, Subcutaneous, Every 7 days    sumatriptan (IMITREX) 100 mg, Oral, Every 2 hours PRN, Not to exceed 200mg in 24 hours       Review of patient's allergies indicates:   Allergen Reactions    Latex, natural rubber Hives, Itching and Swelling        Patient Care Team:  Renee Love FNP as PCP - General (Family Medicine)  Raji Gomes MD as Consulting Physician (Orthopedic Surgery)  [facility] (Physical Therapy)  [facility] as Nurse Practitioner (Behavioral Health)     Subjective:     Review of Systems   Constitutional:  Negative for appetite change, chills, diaphoresis and fever.   HENT:  Negative for ear pain, sinus pain and sore throat.    Eyes:  Negative for pain and visual disturbance.   Respiratory:  Negative for cough, shortness of breath and wheezing.    Cardiovascular:  Negative for chest pain, palpitations and leg swelling.   Gastrointestinal:  Negative for abdominal pain, blood in stool, diarrhea, nausea and vomiting.   Endocrine: Negative for cold intolerance.   Genitourinary:  Negative for difficulty urinating, dysuria, frequency and hematuria.   Musculoskeletal:  Negative for arthralgias, joint swelling and myalgias.   Skin:  Negative for color change and rash.   Allergic/Immunologic: Negative.    Neurological: Negative.  Negative for dizziness, syncope, light-headedness and numbness.   Hematological: Negative.    Psychiatric/Behavioral: Negative.  " Negative for dysphoric mood and suicidal ideas. The patient is not nervous/anxious.    All other systems reviewed and are negative.      12 point review of systems conducted, negative except as stated in the history of present illness. See HPI for details.    Objective:     Visit Vitals  /88   Pulse 76   Temp 98.2 °F (36.8 °C) (Temporal)   Resp 18   Ht 5' 6" (1.676 m)   Wt 106.6 kg (235 lb 0.2 oz)   SpO2 98%   BMI 37.93 kg/m²       Physical Exam  Vitals and nursing note reviewed.   Constitutional:       General: She is not in acute distress.     Appearance: She is not ill-appearing.   HENT:      Head: Normocephalic and atraumatic.      Mouth/Throat:      Mouth: Mucous membranes are moist.      Pharynx: Oropharynx is clear.   Eyes:      General: No scleral icterus.     Extraocular Movements: Extraocular movements intact.      Conjunctiva/sclera: Conjunctivae normal.      Pupils: Pupils are equal, round, and reactive to light.   Neck:      Vascular: No carotid bruit.   Cardiovascular:      Rate and Rhythm: Normal rate and regular rhythm.      Heart sounds: No murmur heard.     No friction rub. No gallop.   Pulmonary:      Effort: Pulmonary effort is normal. No respiratory distress.      Breath sounds: Normal breath sounds. No wheezing, rhonchi or rales.   Abdominal:      General: Abdomen is flat. Bowel sounds are normal. There is no distension.      Palpations: Abdomen is soft. There is no mass.      Tenderness: There is no abdominal tenderness.   Musculoskeletal:         General: Normal range of motion.      Cervical back: Normal range of motion and neck supple.   Skin:     General: Skin is warm and dry.   Neurological:      General: No focal deficit present.      Mental Status: She is alert.   Psychiatric:         Mood and Affect: Mood normal.         Labs Reviewed:   Labs reviewed with patient, verbalized understanding.  Chemistry:  Lab Results   Component Value Date     08/23/2024    K 3.7 08/23/2024 "    BUN 8.0 (L) 08/23/2024    CREATININE 0.79 08/23/2024    EGFRNORACEVR >60 08/23/2024    GLUCOSE 97 08/23/2024    CALCIUM 9.4 08/23/2024    ALKPHOS 64 08/23/2024    LABPROT 7.9 08/23/2024    ALBUMIN 3.6 08/23/2024    AST 58 (H) 08/23/2024    ALT 66 (H) 08/23/2024    MG 2.00 05/09/2024    TSH 1.918 03/28/2024        Lab Results   Component Value Date    HGBA1C 7.5 (H) 08/20/2024        Hematology:  Lab Results   Component Value Date    WBC 3.87 (L) 08/20/2024    HGB 14.0 08/20/2024    HCT 40.5 08/20/2024     (L) 08/20/2024       Lipid Panel:  Lab Results   Component Value Date    CHOL 211 (H) 07/01/2024    HDL 58 07/01/2024    .00 07/01/2024    TRIG 142 (H) 07/01/2024    TOTALCHOLEST 4 07/01/2024        Urine:  Lab Results   Component Value Date    APPEARANCEUA Clear 08/20/2024    SGUA 1.025 08/20/2024    PROTEINUA Negative 08/20/2024    KETONESUA Negative 08/20/2024    LEUKOCYTESUR Negative 08/20/2024    RBCUA 0-2 04/05/2023    WBCUA 0-2 04/05/2023    BACTERIA Rare 04/05/2023    CREATRANDUR 64.7 07/01/2024        Assessment:       ICD-10-CM ICD-9-CM   1. Chronic migraine with aura without status migrainosus, not intractable  G43.E09 346.00   2. Elevated liver enzymes  R74.8 790.5   3. Rebound mood swings  R45.86 296.99   4. Anxiety  F41.9 300.00        Plan:     1. Chronic migraine with aura without status migrainosus, not intractable  Overview:  Right side migraine, seen by neurology.     Orders:  -     sumatriptan (IMITREX) 100 MG tablet; Take 1 tablet (100 mg total) by mouth every 2 (two) hours as needed for Migraine (2 doses in 24 hr period.). Not to exceed 200mg in 24 hours  Dispense: 9 tablet; Refill: 3    2. Elevated liver enzymes  Overview:  Elevated LFT- will change from paroxetine to citalopram after procedure, tomorrow she will decrease her dose of paroxetine to 20mg,and in 1 week will reassess LFT, will also assess for thyroid and iron levels, as this could also cause elevated  LFT.    Orders:  -     Ambulatory referral/consult to Gastroenterology; Future; Expected date: 09/02/2024  -     US Abdomen Complete; Future; Expected date: 08/26/2024  -     Iron and TIBC; Future; Expected date: 08/26/2024  -     TSH; Future; Expected date: 08/26/2024  -     T4, Free; Future; Expected date: 08/26/2024  -     T3, Free (OLG); Future; Expected date: 08/26/2024  -     Hepatitis B Core Antibody, Total; Future; Expected date: 08/26/2024    3. Rebound mood swings  Overview:  On fluoxetine, 40mg, LFT is elevated, she is switched to citalopram, per UTD recommendations, she will decrease her dose of fluoxetine to 20mg and after procedure will start citalopram. Will reassess efficacy in 2 weeks.      4. Anxiety  Overview:  Anxiety- stopped fluoxetine due to increased LFT and will start citalopram.      Other orders  -     citalopram (CELEXA) 10 MG tablet; Take 1 tablet (10 mg total) by mouth once daily.  Dispense: 30 tablet; Refill: 11         No follow-ups on file. In addition to their scheduled follow up, the patient has also been instructed to follow up on as needed basis.     Future Appointments   Date Time Provider Department Center   9/5/2024  9:00 AM [facility] US2 [facility] ULTRA [facility]   11/6/2024  9:30 AM RESIDENT 2, [facility] ORTHOPEDICS [facility] ORTHO [facility]   3/31/2025  9:00 AM Renee Love FNP [facility] FAMMED [facility]

## 2024-08-27 ENCOUNTER — ANESTHESIA EVENT (OUTPATIENT)
Dept: SURGERY | Facility: HOSPITAL | Age: 54
End: 2024-08-27
Payer: MEDICAID

## 2024-08-27 NOTE — ANESTHESIA PREPROCEDURE EVALUATION
2024  Raquel Byrne is a 54 y.o., female hysteroscopy, D&C.      Anesthesia History     Hypertension Constipation   Ovarian cyst Abnormal vaginal bleeding   Torn ACL Medial meniscus tear   Anxiety      Surgical History     TUBAL LIGATION INCISION AND DRAINAGE   LAPAROSCOPIC CHOLECYSTECTOMY WITH CHOLANGIOGRAPHY      Substance History     Smoking Status: Former - 5.8 pack years   Quit Smokin14   Smokeless Tobacco Status: Never   Alcohol use: Never   Drug use: Not Currently     Problem List   Current as of 24 1102  Abdominal pain   Acute pain of right knee   Anxiety   BMI 37.0-37.9, adult   Candidiasis of breast   Carrier of ureaplasma urealyticum   Cervical cancer screening   Chronic cholecystitis   Chronic left-sided low back pain with left-sided sciatica   Chronic migraine with aura without status migrainosus, not intractable   Chronic right shoulder pain   Elevated LFTs   Elevated liver enzymes   Hypertension   Left shoulder pain   Muscle spasm   Nausea and vomiting   Nerve entrapment syndrome of left upper extremity   Nicotine dependence, uncomplicated   Obesity (BMI 35.0-39.9 without comorbidity)   PMB (postmenopausal bleeding)   Pain of upper abdomen   Pap smear for cervical cancer screening   Paronychia of finger of left hand   Pars defect of lumbar spine   Post-nasal drip   Psoriasis   Rebound mood swings   Rupture of anterior cruciate ligament of right knee   Skin candidiasis   Thickened endometrium   Toenail fungus   Type 2 diabetes mellitus without complication, without long-term current use of insulin       Prior to Admission Medications      Taking? Last Dose Start Date End Date Provider    atorvastatin (LIPITOR) 40 MG tablet   --  24  --  Renee Love FNP    Take 1 tablet (40 mg total) by mouth every evening.    blood sugar diagnostic Strp   --  24  --  Renee Love  FNP    To check BG one times daily, to use with insurance preferred meter    blood-glucose meter kit   --  24  --  Renee Love FNP    To check BG one times daily, to use with insurance preferred meter    citalopram (CELEXA) 10 MG tablet   --  24  Love, Renee, FNP    Take 1 tablet (10 mg total) by mouth once daily.    clonazePAM (KLONOPIN) 1 MG tablet   --  24  --  Love, Renee, FNP    Take 1 tablet (1 mg total) by mouth 2 (two) times daily. as needed for anxiety.    clotrimazole (LOTRIMIN) 1 % cream   --  24  --  Love Renee, FNP    Apply topically 2 (two) times daily.    cyclobenzaprine (FLEXERIL) 10 MG tablet   --  05/15/24  --  Love, Renee, FNP    Take 1 tablet (10 mg total) by mouth 3 (three) times daily as needed for Muscle spasms.    gabapentin (NEURONTIN) 100 MG capsule ()   --  24  Allison Garrett PA-C    Take 1 capsule (100 mg total) by mouth 3 (three) times daily. for 7 days    ibuprofen (ADVIL,MOTRIN) 800 MG tablet   --  24  --  Love, Renee, FNP    Take 1 tablet (800 mg total) by mouth 3 (three) times daily. Take after eating, 6am, 2pm, 10pm.    lancets Misc   --  24  --  Kate Renee, FNP    To check BG one times daily, to use with insurance preferred meter    metFORMIN (GLUCOPHAGE) 1000 MG tablet   --  24  Love, Renee, FNP    Take 1 tablet (1,000 mg total) by mouth 2 (two) times daily with meals.    metoprolol succinate (TOPROL-XL) 25 MG 24 hr tablet   --  04/15/24  --  Love, Renee, FNP    Take 1 tablet (25 mg total) by mouth once daily.    metroNIDAZOLE (FLAGYL) 500 MG tablet   --  24  --  Love, Renee, FNP    Take 1 tablet (500 mg total) by mouth every 12 (twelve) hours.    mupirocin (BACTROBAN) 2 % ointment   --  24  --  Love, Renee, FNP    Apply topically 3 (three) times daily.    ondansetron (ZOFRAN-ODT) 8 MG TbDL   --  24  --  Love, Renee, FNP    Take 1 tablet (8 mg total) by mouth every 6 (six) hours as needed.     OZEMPIC 0.25 mg or 0.5 mg (2 mg/3 mL) pen injector   --  07/29/24  --  Love, Renee, FNP    Inject 0.5 mg into the skin every 7 days.    Patient taking differently: Inject 0.5 mg into the skin every 7 days. Wednesday    sumatriptan (IMITREX) 100 MG tablet   --  08/26/24  --  Love, Renee, FNP    Take 1 tablet (100 mg total) by mouth every 2 (two) hours as needed for Migraine (2 doses in 24 hr period.). Not to exceed 200mg in 24 hours         Pre-op Assessment    I have reviewed the Patient Summary Reports.     I have reviewed the Nursing Notes. I have reviewed the NPO Status.   I have reviewed the Medications.     Review of Systems  Anesthesia Hx:  No problems with previous Anesthesia                Social:  Non-Smoker Medical weed daily      Hematology/Oncology:    Oncology Normal                                   EENT/Dental:  EENT/Dental Normal           Cardiovascular:     Hypertension               Did not take BP med today                         Pulmonary:        Sleep Apnea                Renal/:  Renal/ Normal                 Hepatic/GI:      Liver Disease,            Musculoskeletal:  Musculoskeletal Normal                Neurological:    Neuromuscular Disease,  Headaches                                 Endocrine:  Diabetes, type 2         Obesity / BMI > 30  Dermatological:  Skin Normal    Psych:  Psychiatric History anxiety depression ADD ADHD               Physical Exam  General: Well nourished, Cooperative, Alert and Oriented    Airway:  Mallampati: I   Mouth Opening: Normal  TM Distance: Normal  Tongue: Normal  Neck ROM: Normal ROM    Dental:  Intact    Chest/Lungs:  Clear to auscultation    Abdomen:  Normal        Anesthesia Plan  Type of Anesthesia, risks & benefits discussed:    Anesthesia Type: Gen Supraglottic Airway, Gen Natural Airway  Intra-op Monitoring Plan: Standard ASA Monitors  Post Op Pain Control Plan: multimodal analgesia  Induction:  IV  Airway Plan: Direct  Informed Consent:  Informed consent signed with the Patient and all parties understand the risks and agree with anesthesia plan.  All questions answered. Patient consented to blood products? Yes  ASA Score: 3  Day of Surgery Review of History & Physical: H&P Update referred to the surgeon/provider.I have interviewed and examined the patient. I have reviewed the patient's H&P dated: KSA. There are no significant changes. H&P completed by Anesthesiologist.    Ready For Surgery From Anesthesia Perspective.     .

## 2024-08-28 ENCOUNTER — HOSPITAL ENCOUNTER (OUTPATIENT)
Facility: HOSPITAL | Age: 54
Discharge: HOME | End: 2024-08-28
Attending: OBSTETRICS & GYNECOLOGY | Admitting: OBSTETRICS & GYNECOLOGY
Payer: MEDICAID

## 2024-08-28 ENCOUNTER — ANESTHESIA (OUTPATIENT)
Dept: SURGERY | Facility: HOSPITAL | Age: 54
End: 2024-08-28
Payer: MEDICAID

## 2024-08-28 DIAGNOSIS — N95.0 PMB (POSTMENOPAUSAL BLEEDING): ICD-10-CM

## 2024-08-28 DIAGNOSIS — Z98.890 S/P DILATATION AND CURETTAGE: ICD-10-CM

## 2024-08-28 DIAGNOSIS — Z98.890 STATUS POST HYSTEROSCOPY: Primary | ICD-10-CM

## 2024-08-28 DIAGNOSIS — R93.89 THICKENED ENDOMETRIUM: ICD-10-CM

## 2024-08-28 DIAGNOSIS — Z22.39 CARRIER OF UREAPLASMA UREALYTICUM: ICD-10-CM

## 2024-08-28 LAB
ABO AND RH: NORMAL
ABORH RETYPE: NORMAL
ANTIBODY SCREEN: NORMAL
B-HCG UR QL: NEGATIVE
POCT GLUCOSE: 137 MG/DL (ref 70–110)
SPECIMEN OUTDATE: NORMAL

## 2024-08-28 PROCEDURE — 63600175 PHARM REV CODE 636 W HCPCS: Performed by: NURSE ANESTHETIST, CERTIFIED REGISTERED

## 2024-08-28 PROCEDURE — 71000015 HC POSTOP RECOV 1ST HR: Performed by: OBSTETRICS & GYNECOLOGY

## 2024-08-28 PROCEDURE — S5010 5% DEXTROSE AND 0.45% SALINE: HCPCS | Performed by: OBSTETRICS & GYNECOLOGY

## 2024-08-28 PROCEDURE — C1782 MORCELLATOR: HCPCS | Performed by: OBSTETRICS & GYNECOLOGY

## 2024-08-28 PROCEDURE — 25000003 PHARM REV CODE 250: Performed by: OBSTETRICS & GYNECOLOGY

## 2024-08-28 PROCEDURE — 36415 COLL VENOUS BLD VENIPUNCTURE: CPT | Performed by: OBSTETRICS & GYNECOLOGY

## 2024-08-28 PROCEDURE — 37000009 HC ANESTHESIA EA ADD 15 MINS: Performed by: OBSTETRICS & GYNECOLOGY

## 2024-08-28 PROCEDURE — 58558 HYSTEROSCOPY BIOPSY: CPT | Mod: ,,, | Performed by: OBSTETRICS & GYNECOLOGY

## 2024-08-28 PROCEDURE — 36000706: Performed by: OBSTETRICS & GYNECOLOGY

## 2024-08-28 PROCEDURE — 71000033 HC RECOVERY, INTIAL HOUR: Performed by: OBSTETRICS & GYNECOLOGY

## 2024-08-28 PROCEDURE — 63600175 PHARM REV CODE 636 W HCPCS: Performed by: OBSTETRICS & GYNECOLOGY

## 2024-08-28 PROCEDURE — 25000003 PHARM REV CODE 250: Performed by: NURSE ANESTHETIST, CERTIFIED REGISTERED

## 2024-08-28 PROCEDURE — 36000707: Performed by: OBSTETRICS & GYNECOLOGY

## 2024-08-28 PROCEDURE — 82962 GLUCOSE BLOOD TEST: CPT | Performed by: OBSTETRICS & GYNECOLOGY

## 2024-08-28 PROCEDURE — 86850 RBC ANTIBODY SCREEN: CPT | Performed by: OBSTETRICS & GYNECOLOGY

## 2024-08-28 PROCEDURE — 71000016 HC POSTOP RECOV ADDL HR: Performed by: OBSTETRICS & GYNECOLOGY

## 2024-08-28 PROCEDURE — 86900 BLOOD TYPING SEROLOGIC ABO: CPT | Performed by: OBSTETRICS & GYNECOLOGY

## 2024-08-28 PROCEDURE — 81025 URINE PREGNANCY TEST: CPT | Performed by: OBSTETRICS & GYNECOLOGY

## 2024-08-28 PROCEDURE — 86901 BLOOD TYPING SEROLOGIC RH(D): CPT | Performed by: OBSTETRICS & GYNECOLOGY

## 2024-08-28 PROCEDURE — 37000008 HC ANESTHESIA 1ST 15 MINUTES: Performed by: OBSTETRICS & GYNECOLOGY

## 2024-08-28 RX ORDER — METOPROLOL SUCCINATE 25 MG/1
25 TABLET, EXTENDED RELEASE ORAL ONCE
Status: COMPLETED | OUTPATIENT
Start: 2024-08-28 | End: 2024-08-28

## 2024-08-28 RX ORDER — ONDANSETRON HYDROCHLORIDE 2 MG/ML
INJECTION, SOLUTION INTRAVENOUS
Status: DISCONTINUED | OUTPATIENT
Start: 2024-08-28 | End: 2024-08-28

## 2024-08-28 RX ORDER — FENTANYL CITRATE 50 UG/ML
INJECTION, SOLUTION INTRAMUSCULAR; INTRAVENOUS
Status: DISCONTINUED | OUTPATIENT
Start: 2024-08-28 | End: 2024-08-28

## 2024-08-28 RX ORDER — DIPHENHYDRAMINE HYDROCHLORIDE 50 MG/ML
25 INJECTION INTRAMUSCULAR; INTRAVENOUS EVERY 4 HOURS PRN
Status: DISCONTINUED | OUTPATIENT
Start: 2024-08-28 | End: 2024-08-28 | Stop reason: HOSPADM

## 2024-08-28 RX ORDER — SODIUM CHLORIDE 9 MG/ML
INJECTION, SOLUTION INTRAVENOUS CONTINUOUS
Status: DISCONTINUED | OUTPATIENT
Start: 2024-08-28 | End: 2024-08-28 | Stop reason: HOSPADM

## 2024-08-28 RX ORDER — OXYCODONE AND ACETAMINOPHEN 10; 325 MG/1; MG/1
1 TABLET ORAL EVERY 4 HOURS PRN
Status: DISCONTINUED | OUTPATIENT
Start: 2024-08-28 | End: 2024-08-28 | Stop reason: HOSPADM

## 2024-08-28 RX ORDER — HYDROMORPHONE HYDROCHLORIDE 1 MG/ML
1 INJECTION, SOLUTION INTRAMUSCULAR; INTRAVENOUS; SUBCUTANEOUS EVERY 6 HOURS PRN
Status: DISCONTINUED | OUTPATIENT
Start: 2024-08-28 | End: 2024-08-28 | Stop reason: HOSPADM

## 2024-08-28 RX ORDER — FAMOTIDINE 10 MG/ML
20 INJECTION INTRAVENOUS 2 TIMES DAILY
Status: DISCONTINUED | OUTPATIENT
Start: 2024-08-28 | End: 2024-08-28 | Stop reason: HOSPADM

## 2024-08-28 RX ORDER — SODIUM CHLORIDE, SODIUM LACTATE, POTASSIUM CHLORIDE, CALCIUM CHLORIDE 600; 310; 30; 20 MG/100ML; MG/100ML; MG/100ML; MG/100ML
INJECTION, SOLUTION INTRAVENOUS CONTINUOUS
Status: DISCONTINUED | OUTPATIENT
Start: 2024-08-28 | End: 2024-08-28 | Stop reason: HOSPADM

## 2024-08-28 RX ORDER — HYDROCODONE BITARTRATE AND ACETAMINOPHEN 5; 325 MG/1; MG/1
1 TABLET ORAL EVERY 4 HOURS PRN
Status: DISCONTINUED | OUTPATIENT
Start: 2024-08-28 | End: 2024-08-28 | Stop reason: HOSPADM

## 2024-08-28 RX ORDER — ONDANSETRON 4 MG/1
8 TABLET, ORALLY DISINTEGRATING ORAL EVERY 8 HOURS PRN
Status: DISCONTINUED | OUTPATIENT
Start: 2024-08-28 | End: 2024-08-28 | Stop reason: HOSPADM

## 2024-08-28 RX ORDER — DIPHENHYDRAMINE HCL 25 MG
25 CAPSULE ORAL EVERY 4 HOURS PRN
Status: DISCONTINUED | OUTPATIENT
Start: 2024-08-28 | End: 2024-08-28 | Stop reason: HOSPADM

## 2024-08-28 RX ORDER — MUPIROCIN 20 MG/G
OINTMENT TOPICAL
Status: DISCONTINUED | OUTPATIENT
Start: 2024-08-28 | End: 2024-08-28 | Stop reason: HOSPADM

## 2024-08-28 RX ORDER — IBUPROFEN 600 MG/1
600 TABLET ORAL EVERY 6 HOURS PRN
Status: DISCONTINUED | OUTPATIENT
Start: 2024-08-28 | End: 2024-08-28 | Stop reason: HOSPADM

## 2024-08-28 RX ORDER — GLYCOPYRROLATE 0.2 MG/ML
0.2 INJECTION INTRAMUSCULAR; INTRAVENOUS ONCE
Status: COMPLETED | OUTPATIENT
Start: 2024-08-28 | End: 2024-08-28

## 2024-08-28 RX ORDER — PROPOFOL 10 MG/ML
VIAL (ML) INTRAVENOUS
Status: DISCONTINUED | OUTPATIENT
Start: 2024-08-28 | End: 2024-08-28

## 2024-08-28 RX ORDER — DEXTROSE MONOHYDRATE AND SODIUM CHLORIDE 5; .45 G/100ML; G/100ML
INJECTION, SOLUTION INTRAVENOUS CONTINUOUS
Status: DISCONTINUED | OUTPATIENT
Start: 2024-08-28 | End: 2024-08-28 | Stop reason: HOSPADM

## 2024-08-28 RX ORDER — TRIPROLIDINE/PSEUDOEPHEDRINE 2.5MG-60MG
30 TABLET ORAL EVERY 6 HOURS PRN
Qty: 480 ML | Refills: 2 | Status: SHIPPED | OUTPATIENT
Start: 2024-08-28

## 2024-08-28 RX ORDER — VECURONIUM BROMIDE 1 MG/ML
INJECTION, POWDER, LYOPHILIZED, FOR SOLUTION INTRAVENOUS
Status: DISCONTINUED | OUTPATIENT
Start: 2024-08-28 | End: 2024-08-28

## 2024-08-28 RX ORDER — FAMOTIDINE 20 MG/1
20 TABLET, FILM COATED ORAL
Status: COMPLETED | OUTPATIENT
Start: 2024-08-28 | End: 2024-08-28

## 2024-08-28 RX ORDER — KETOROLAC TROMETHAMINE 30 MG/ML
INJECTION, SOLUTION INTRAMUSCULAR; INTRAVENOUS
Status: DISCONTINUED | OUTPATIENT
Start: 2024-08-28 | End: 2024-08-28

## 2024-08-28 RX ORDER — MIDAZOLAM HYDROCHLORIDE 1 MG/ML
2 INJECTION INTRAMUSCULAR; INTRAVENOUS ONCE
Status: COMPLETED | OUTPATIENT
Start: 2024-08-28 | End: 2024-08-28

## 2024-08-28 RX ORDER — LIDOCAINE HYDROCHLORIDE 20 MG/ML
INJECTION INTRAVENOUS
Status: DISCONTINUED | OUTPATIENT
Start: 2024-08-28 | End: 2024-08-28

## 2024-08-28 RX ADMIN — GLYCOPYRROLATE 0.2 MG: 0.2 INJECTION INTRAMUSCULAR; INTRAVENOUS at 08:08

## 2024-08-28 RX ADMIN — FENTANYL CITRATE 100 MCG: 50 INJECTION, SOLUTION INTRAMUSCULAR; INTRAVENOUS at 09:08

## 2024-08-28 RX ADMIN — FENTANYL CITRATE 50 MCG: 50 INJECTION, SOLUTION INTRAMUSCULAR; INTRAVENOUS at 09:08

## 2024-08-28 RX ADMIN — PROPOFOL 150 MG: 10 INJECTION, EMULSION INTRAVENOUS at 09:08

## 2024-08-28 RX ADMIN — CEFAZOLIN 2 G: 2 INJECTION, POWDER, FOR SOLUTION INTRAMUSCULAR; INTRAVENOUS at 09:08

## 2024-08-28 RX ADMIN — VECURONIUM BROMIDE 5 MG: 10 INJECTION, POWDER, FOR SOLUTION INTRAVENOUS at 09:08

## 2024-08-28 RX ADMIN — ONDANSETRON 4 MG: 2 INJECTION INTRAMUSCULAR; INTRAVENOUS at 09:08

## 2024-08-28 RX ADMIN — LIDOCAINE HYDROCHLORIDE 50 MG: 20 INJECTION, SOLUTION INTRAVENOUS at 09:08

## 2024-08-28 RX ADMIN — METOPROLOL SUCCINATE 25 MG: 25 TABLET, EXTENDED RELEASE ORAL at 07:08

## 2024-08-28 RX ADMIN — MUPIROCIN: 20 OINTMENT TOPICAL at 07:08

## 2024-08-28 RX ADMIN — DEXTROSE AND SODIUM CHLORIDE: 5; 450 INJECTION, SOLUTION INTRAVENOUS at 07:08

## 2024-08-28 RX ADMIN — MIDAZOLAM HYDROCHLORIDE 2 MG: 1 INJECTION, SOLUTION INTRAMUSCULAR; INTRAVENOUS at 08:08

## 2024-08-28 RX ADMIN — FAMOTIDINE 20 MG: 20 TABLET, FILM COATED ORAL at 07:08

## 2024-08-28 RX ADMIN — KETOROLAC TROMETHAMINE 30 MG: 30 INJECTION, SOLUTION INTRAMUSCULAR; INTRAVENOUS at 09:08

## 2024-08-28 NOTE — PLAN OF CARE
PT IS BACK TO ROOM, RESTING WITH EYES CLOSED, IN STABLE CONDITION, NO DIFFICULTY BREATHING OR SWALLOWING. PT IS TOLERATING LIQUIDS, FAMILY AT SIDE, SR X2, CB IN REACH

## 2024-08-28 NOTE — ANESTHESIA PROCEDURE NOTES
Intubation    Date/Time: 8/28/2024 9:21 AM    Performed by: El Ruth CRNA  Authorized by: El Ruth CRNA    Intubation:     Induction:  Intravenous    Intubated:  Postinduction    Mask Ventilation:  Easy mask    Attempts:  1    Attempted By:  CRNA    Method of Intubation:  Direct    Blade:  Villarreal 2    Laryngeal View Grade: Grade I - full view of cords      Difficult Airway Encountered?: No      Airway Device:  Oral endotracheal tube    Airway Device Size:  7.0    Style/Cuff Inflation:  Cuffed    Tube secured:  20    Secured at:  The lips    Placement Verified By:  Capnometry    Complicating Factors:  None    Findings Post-Intubation:  BS equal bilateral and atraumatic/condition of teeth unchanged

## 2024-08-28 NOTE — BRIEF OP NOTE
[facility]  Brief Operative Note    Surgery Date: 8/28/2024     Surgeons and Role:     * Jose Luis Ford MD - Primary    Assisting Surgeon: None    Pre-op Diagnosis:  PMB (postmenopausal bleeding) [N95.0]  Thickened endometrium [R93.89]  Carrier of ureaplasma urealyticum [Z22.39]  BMI 37.0-37.9, adult [Z68.37]    Post-op Diagnosis:  Post-Op Diagnosis Codes:     * PMB (postmenopausal bleeding) [N95.0]     * Thickened endometrium [R93.89]     * Endometrial polyp [N84.0]    Procedure(s) (LRB):  HYSTEROSCOPY, WITH DILATION AND CURETTAGE OF UTERUS (N/A)    Anesthesia: General    Operative Findings: see op note    Estimated Blood Loss: * No values recorded between 8/28/2024  9:32 AM and 8/28/2024  9:46 AM *         Specimens:   Specimen (24h ago, onward)      None              Discharge Note    OUTCOME: Patient tolerated treatment/procedure well without complication and is now ready for discharge.    DISPOSITION: Home or Self Care    FINAL DIAGNOSIS:  S/P dilatation and curettage    FOLLOWUP: In clinic    DISCHARGE INSTRUCTIONS:  No discharge procedures on file.

## 2024-08-28 NOTE — DISCHARGE INSTRUCTIONS
DECREASED ACTIVITY TODAY, NO HEAVY LIFTING OR STRAINING, NO PUSHING OR PULLING, DON'T OPERATE MACHINERY/VEHICLE IN 24 HOURS, NO DRIVING TODAY OR WHILE TAKING PAIN MEDS, NO INTERCOURSE, DOUCHING, OR TAMPONS, SHOWERS ONLY, NO TUB BATHS, DECREASED ACTIVITY UNTIL AFTER APPOINTMENT

## 2024-08-28 NOTE — ANESTHESIA POSTPROCEDURE EVALUATION
Anesthesia Post Evaluation    Patient: Raquel Byrne    Procedure(s) Performed: Procedure(s) (LRB):  HYSTEROSCOPY, WITH DILATION AND CURETTAGE OF UTERUS (N/A)    Final Anesthesia Type: general      Patient location during evaluation: PACU  Patient participation: Yes- Able to Participate  Level of consciousness: awake and alert, awake and oriented  Post-procedure vital signs: reviewed and stable  Pain management: adequate  Airway patency: patent    PONV status at discharge: No PONV  Anesthetic complications: no      Cardiovascular status: blood pressure returned to baseline  Respiratory status: unassisted, room air and spontaneous ventilation  Hydration status: euvolemic  Follow-up not needed.              Vitals Value Taken Time   /98 08/28/24 0957   Temp 98.2 08/28/24 0958   Pulse 59 08/28/24 0957   Resp 16 08/28/24 0958   SpO2 91 % 08/28/24 0957   Vitals shown include unfiled device data.      No case tracking events are documented in the log.      Pain/Lori Score: No data recorded

## 2024-08-28 NOTE — OP NOTE
DATE OF PROCEDURE: 8/28/2024    PRE-OP DIAGNOSIS:  PMB (postmenopausal bleeding) [N95.0]  Thickened endometrium [R93.89]    POST-OP DIAGNOSIS:  Post-Op Diagnosis Codes:     * PMB (postmenopausal bleeding) [N95.0]     * Thickened endometrium [R93.89]     * Endometrial polyp    Procedure:   Hysteroscope Dilation and Curettage  MyoSure polypectomy    Surgeon: Jose Luis Ford MD    Assistant: Cheri Rowe NP    Anesthesia: General    Estimated Blood Loss:  5 cc    Findings:   Normal vagina.  Cervix normal-appearing multiparous cervix, mobile.  Uterus sounded to 8 cm.  Several endometrial polyps obstructing view of the endometrial cavity, otherwise atrophic appearing endometrium.     Procedure:   After consent were reviewed, the patient was taken to the operative room and placed on the OR table.  A time out  was held and after general anesthesia was induced.    The patient was placed in the dorsal lithotomy position and prepped and draped in the standard sterile fashion.  A weighted speculum was placed in the posterior vagina.  The cervix was identified and grasped with a single tooth tenaculum.  The uterus was sounded to 8 cm.  The cervix was then dilated to allow passage of a rigid hysteroscope.  The scope was inserted.  The above findings were noted.  Due to the location of the endometrial polyps with 1 being deep in the left cornua, the MyoSure device was used to remove the endometrial polyps in their entirety and restore the endometrial cavity to a normal appearance.  The remainder of the endometrium appeared atrophic.  The scope was removed.  The endometrium was curetted until gritty texture was noted throughput the cavity. Specimen was handed off.  An endocervical curetting was performed and handed off separately.    At that point, the procedure was terminated.  All instruments were removed.  Counts were correct.  Patient was awakened and taken to the recovery room in stable condition having tolerated the procedure  well.     I agree with anesthesia plan of care.

## 2024-08-30 VITALS
DIASTOLIC BLOOD PRESSURE: 68 MMHG | RESPIRATION RATE: 18 BRPM | TEMPERATURE: 98 F | BODY MASS INDEX: 37.28 KG/M2 | WEIGHT: 231 LBS | OXYGEN SATURATION: 98 % | SYSTOLIC BLOOD PRESSURE: 147 MMHG | HEART RATE: 55 BPM

## 2024-08-30 LAB — BEAKER SEE SCANNED REPORT: NORMAL

## 2024-09-06 ENCOUNTER — HOSPITAL ENCOUNTER (OUTPATIENT)
Dept: RADIOLOGY | Facility: HOSPITAL | Age: 54
Discharge: HOME | End: 2024-09-06
Attending: NURSE PRACTITIONER
Payer: MEDICAID

## 2024-09-06 DIAGNOSIS — R74.8 ELEVATED LIVER ENZYMES: ICD-10-CM

## 2024-09-06 PROCEDURE — 76700 US EXAM ABDOM COMPLETE: CPT | Mod: TC

## 2024-09-10 ENCOUNTER — OFFICE VISIT (OUTPATIENT)
Dept: OBSTETRICS AND GYNECOLOGY | Facility: CLINIC | Age: 54
End: 2024-09-10
Payer: MEDICAID

## 2024-09-10 VITALS
TEMPERATURE: 97 F | SYSTOLIC BLOOD PRESSURE: 130 MMHG | DIASTOLIC BLOOD PRESSURE: 66 MMHG | HEIGHT: 66 IN | BODY MASS INDEX: 37.23 KG/M2 | WEIGHT: 231.63 LBS

## 2024-09-10 DIAGNOSIS — N95.0 PMB (POSTMENOPAUSAL BLEEDING): Primary | ICD-10-CM

## 2024-09-10 DIAGNOSIS — N84.0 ENDOMETRIAL POLYP: ICD-10-CM

## 2024-09-10 PROCEDURE — 3075F SYST BP GE 130 - 139MM HG: CPT | Mod: CPTII,,, | Performed by: OBSTETRICS & GYNECOLOGY

## 2024-09-10 PROCEDURE — 99024 POSTOP FOLLOW-UP VISIT: CPT | Mod: ,,, | Performed by: OBSTETRICS & GYNECOLOGY

## 2024-09-10 PROCEDURE — 3051F HG A1C>EQUAL 7.0%<8.0%: CPT | Mod: CPTII,,, | Performed by: OBSTETRICS & GYNECOLOGY

## 2024-09-10 PROCEDURE — 3078F DIAST BP <80 MM HG: CPT | Mod: CPTII,,, | Performed by: OBSTETRICS & GYNECOLOGY

## 2024-09-10 PROCEDURE — 3066F NEPHROPATHY DOC TX: CPT | Mod: CPTII,,, | Performed by: OBSTETRICS & GYNECOLOGY

## 2024-09-10 PROCEDURE — 3061F NEG MICROALBUMINURIA REV: CPT | Mod: CPTII,,, | Performed by: OBSTETRICS & GYNECOLOGY

## 2024-09-10 PROCEDURE — 1159F MED LIST DOCD IN RCRD: CPT | Mod: CPTII,,, | Performed by: OBSTETRICS & GYNECOLOGY

## 2024-09-10 RX ORDER — LANCETS 33 GAUGE
EACH MISCELLANEOUS
COMMUNITY
Start: 2024-07-27

## 2024-09-10 RX ORDER — NALOXONE HYDROCHLORIDE 4 MG/.1ML
SPRAY NASAL
COMMUNITY
Start: 2024-07-31

## 2024-09-10 RX ORDER — TRAMADOL HYDROCHLORIDE 50 MG/1
TABLET ORAL
COMMUNITY

## 2024-09-10 RX ORDER — LANCETS 30 GAUGE
EACH MISCELLANEOUS
COMMUNITY
Start: 2024-04-16

## 2024-09-10 NOTE — PROGRESS NOTES
"HPI:   54 y.o. F s/p hysteroscope D&C on 8/28/2024 for PMB here for postop visit.  Multiple endometrial polyps with otherwise relatively atrophic appearing endometrium noted during procedure.  She has had no further bleeding.    Pathology:   Endometrial curettings, endometrial polyps:  Fragments of benign endometrial tissue suggestive of endometrial polyp  Benign smooth muscle   No dysplasia or malignancy  Endocervix:  Benign fragments of endocervical glandular and stromal tissue  Detached fragments of benign reactive squamous epithelium   No dysplasia or malignancy identified         Physical Exam:   /66 (BP Location: Left arm, Patient Position: Sitting, BP Method: Medium (Manual))   Temp 97.2 °F (36.2 °C) (Temporal)   Ht 5' 6" (1.676 m)   Wt 105.1 kg (231 lb 9.6 oz)   BMI 37.38 kg/m²   Gen: NAD  Abd: Soft, NT.    Assessment:   1. PMB (postmenopausal bleeding)    2. Endometrial polyp    Hysteroscopy, With Dilation And Curettage Of Uterus       Plan:   Reviewed pathology  Polyps removed and were benign.    If no further bleeding no need for further management this time.    Call if bleeding recurs.    RTC prn/annual    This note was transcribed by Jamila Mack. There may be transcription errors as a result, however minimal. Effort has been made to ensure accuracy of transcription, but any obvious errors or omissions should be clarified with the author of the document.       I agree with the above documentation.         "

## 2024-09-17 ENCOUNTER — LAB VISIT (OUTPATIENT)
Dept: LAB | Facility: HOSPITAL | Age: 54
End: 2024-09-17
Attending: NURSE PRACTITIONER
Payer: MEDICAID

## 2024-09-17 ENCOUNTER — OFFICE VISIT (OUTPATIENT)
Dept: FAMILY MEDICINE | Facility: CLINIC | Age: 54
End: 2024-09-17
Payer: MEDICAID

## 2024-09-17 DIAGNOSIS — N30.00 ACUTE CYSTITIS WITHOUT HEMATURIA: Primary | ICD-10-CM

## 2024-09-17 DIAGNOSIS — R74.8 ELEVATED LIVER ENZYMES: ICD-10-CM

## 2024-09-17 DIAGNOSIS — F41.9 ANXIETY: ICD-10-CM

## 2024-09-17 DIAGNOSIS — R39.9 UTI SYMPTOMS: Primary | ICD-10-CM

## 2024-09-17 DIAGNOSIS — K76.0 HEPATIC STEATOSIS: ICD-10-CM

## 2024-09-17 LAB
BILIRUB SERPL-MCNC: NORMAL MG/DL
BLOOD URINE, POC: NORMAL
CLARITY, POC UA: NORMAL
COLOR, POC UA: NORMAL
GLUCOSE UR QL STRIP: NORMAL
IRON SATN MFR SERPL: 60 % (ref 20–50)
IRON SERPL-MCNC: 182 UG/DL (ref 50–170)
KETONES UR QL STRIP: NORMAL
LEUKOCYTE ESTERASE URINE, POC: NORMAL
NITRITE, POC UA: POSITIVE
PH, POC UA: 5.5
PROTEIN, POC: NORMAL
SPECIFIC GRAVITY, POC UA: 1.03
T3FREE SERPL-MCNC: 2.93 PG/ML (ref 1.58–3.91)
T4 FREE SERPL-MCNC: 1.04 NG/DL (ref 0.7–1.48)
TIBC SERPL-MCNC: 119 UG/DL (ref 70–310)
TIBC SERPL-MCNC: 301 UG/DL (ref 250–450)
TSH SERPL-ACNC: 1.32 UIU/ML (ref 0.35–4.94)
UROBILINOGEN, POC UA: 2

## 2024-09-17 PROCEDURE — 83540 ASSAY OF IRON: CPT

## 2024-09-17 PROCEDURE — 86704 HEP B CORE ANTIBODY TOTAL: CPT

## 2024-09-17 PROCEDURE — 84439 ASSAY OF FREE THYROXINE: CPT

## 2024-09-17 PROCEDURE — 84443 ASSAY THYROID STIM HORMONE: CPT

## 2024-09-17 PROCEDURE — 84481 FREE ASSAY (FT-3): CPT

## 2024-09-17 PROCEDURE — 36415 COLL VENOUS BLD VENIPUNCTURE: CPT

## 2024-09-17 RX ORDER — CEFDINIR 300 MG/1
300 CAPSULE ORAL 2 TIMES DAILY
Qty: 20 CAPSULE | Refills: 0 | Status: SHIPPED | OUTPATIENT
Start: 2024-09-17 | End: 2024-09-27

## 2024-09-17 RX ORDER — CEFTRIAXONE 1 G/1
1 INJECTION, POWDER, FOR SOLUTION INTRAMUSCULAR; INTRAVENOUS
Status: COMPLETED | OUTPATIENT
Start: 2024-09-17 | End: 2024-09-17

## 2024-09-17 RX ORDER — CLONAZEPAM 1 MG/1
1 TABLET ORAL 2 TIMES DAILY
Qty: 60 TABLET | Refills: 0 | Status: SHIPPED | OUTPATIENT
Start: 2024-09-17

## 2024-09-17 RX ADMIN — CEFTRIAXONE 1 G: 1 INJECTION, POWDER, FOR SOLUTION INTRAMUSCULAR; INTRAVENOUS at 02:09

## 2024-09-17 NOTE — PROGRESS NOTES
Family Medicine      Patient ID: [id number]     Chief Complaint: Urinary Tract Infection (No symptoms )      HPI:     This is a telemedicine note. Patient was treated using telemedicine, real time audio and video, according to [facility] protocols. Renee GU, conducted the visit from the [facility]. The patient participated in the visit at a non-[facility] location selected by the patient, identified below. I am licensed in the state where the patient stated they are located. The patient stated that they understood and accepted the privacy and security risks to their information at their location. This visit is not recorded.    Patient was located at the patient's home.        Raquel Byrne is a 54 y.o. female here today for a telemedicine visit.     Past Medical History:   Diagnosis Date    Abnormal vaginal bleeding     Anxiety     Constipation     Hypertension     Medial meniscus tear     right knee    Ovarian cyst     Torn ACL     right knee        Past Surgical History:   Procedure Laterality Date    HYSTEROSCOPY WITH DILATION AND CURETTAGE OF UTERUS N/A 8/28/2024    Procedure: HYSTEROSCOPY, WITH DILATION AND CURETTAGE OF UTERUS;  Surgeon: Jose Luis Ford MD;  Location: [facility] OR;  Service: OB/GYN;  Laterality: N/A;    INCISION AND DRAINAGE Left     leg left    LAPAROSCOPIC CHOLECYSTECTOMY WITH CHOLANGIOGRAPHY N/A 12/28/2023    Procedure: CHOLECYSTECTOMY, LAPAROSCOPIC, WITH CHOLANGIOGRAM;  Surgeon: Prabhu Hernandes MD;  Location: [facility];  Service: General;  Laterality: N/A;    TUBAL LIGATION          Social History     Tobacco Use    Smoking status: Former     Current packs/day: 0.00     Average packs/day: 0.3 packs/day for 23.0 years (5.8 ttl pk-yrs)     Types: Cigarettes     Start date: 1/1/1991     Quit date: 2014     Years since quitting: 10.7    Smokeless tobacco: Never   Substance and Sexual Activity    Alcohol use: Never    Drug use: Not Currently     Types: Marijuana     Comment:  Medical    Sexual activity: Not Currently     Partners: Male     Birth control/protection: See Surgical Hx        Current Outpatient Medications   Medication Instructions    atorvastatin (LIPITOR) 40 mg, Oral, Nightly    blood sugar diagnostic Strp To check BG one times daily, to use with insurance preferred meter    blood-glucose meter kit To check BG one times daily, to use with insurance preferred meter    cefdinir (OMNICEF) 300 mg, Oral, 2 times daily    citalopram (CELEXA) 10 mg, Oral, Daily    clonazePAM (KLONOPIN) 1 mg, Oral, 2 times daily, as needed for anxiety.    cyclobenzaprine (FLEXERIL) 10 mg, Oral, 3 times daily PRN    ibuprofen (ADVIL,MOTRIN) 800 mg, Oral, 3 times daily, Take after eating, 6am, 2pm, 10pm.    ibuprofen 600 mg, Oral, Every 6 hours PRN    lancets Misc To check BG one times daily, to use with insurance preferred meter    metFORMIN (GLUCOPHAGE) 1,000 mg, Oral, 2 times daily with meals    metoprolol succinate (TOPROL-XL) 25 mg, Oral, Daily    naloxone (NARCAN) 4 mg/actuation [place] Call 911. Administer a single spray intranasally into one nostril upon signs of opioid overdose. May repeat after 3 minutes if no response.    ondansetron (ZOFRAN-ODT) 8 mg, Oral, Every 6 hours PRN    ONETOUCH DELICA PLUS LANCET 30 gauge Misc     ONETOUCH ULTRA2 METER Misc USE 1 TO CHECK GLUCOSE ONCE DAILY    OZEMPIC 0.5 mg, Subcutaneous, Every 7 days    sumatriptan (IMITREX) 100 mg, Oral, Every 2 hours PRN, Not to exceed 200mg in 24 hours    traMADoL (ULTRAM) 50 mg tablet TAKE 1 TABLET BY MOUTH EVERY 12 HOURS AS NEEDED FOR PAIN       Review of patient's allergies indicates:   Allergen Reactions    Latex, natural rubber Hives, Itching and Swelling    Latex Nausea And Vomiting        Patient Care Team:  Renee Love FNP as PCP - General (Family Medicine)  Raji Gomes MD as Consulting Physician (Orthopedic Surgery)  Med[facility] (Physical Therapy)  Esha Chinchilla [facility]  -  Radha James FNP as Nurse Practitioner (Behavioral Health)     Subjective:     Review of Systems   Constitutional:  Negative for activity change and unexpected weight change.   HENT:  Negative for hearing loss, rhinorrhea and trouble swallowing.    Eyes:  Negative for discharge and visual disturbance.   Respiratory:  Negative for chest tightness and wheezing.    Cardiovascular:  Negative for chest pain and palpitations.   Gastrointestinal:  Negative for blood in stool, constipation, diarrhea and vomiting.   Endocrine: Negative for polydipsia and polyuria.   Genitourinary:  Negative for difficulty urinating, dysuria, hematuria and menstrual problem.   Musculoskeletal:  Negative for arthralgias, joint swelling and neck pain.   Neurological:  Negative for weakness and headaches.   Psychiatric/Behavioral:  Negative for confusion and dysphoric mood.        12 point review of systems conducted, negative except as stated in the history of present illness. See HPI for details.    Objective:     There were no vitals taken for this visit.    Physical Exam    Physical Exam: LIMITED DUE TO TELEMEDICINE RESTRICTIONS.  General: Alert and oriented, No acute distress.  Head: Normocephalic, Atraumatic.  Eye: Sclera non-icteric.  Neck/Thyroid:  Full range of motion.  Respiratory: Non-labored respirations, Symmetrical chest wall expansion.  Musculoskeletal: Normal range of motion.  Integumentary:  No visible suspicious lesions or rashes. No diaphoresis.   Neurologic: No focal deficits  Psychiatric: Normal interaction, Coherent speech, Euthymic mood, Appropriate affect     Assessment:       ICD-10-CM ICD-9-CM   1. Acute cystitis without hematuria  N30.00 595.0   2. Anxiety  F41.9 300.00   3. Hepatic steatosis  K76.0 571.8        Plan:     1. Acute cystitis without hematuria  Overview:  Complaining of right waist pain, intermittently, that is sharp, she denies drinking energy drinks but is drinking soda, is drinking juice,  and water. Advised to continue with the increased water intake, wipe front to back, cotton underwear.    Orders:  -     cefdinir (OMNICEF) 300 MG capsule; Take 1 capsule (300 mg total) by mouth 2 (two) times daily. for 10 days  Dispense: 20 capsule; Refill: 0  -     cefTRIAXone injection 1 g  -     POCT URINE DIPSTICK WITHOUT MICROSCOPE    2. Anxiety  Overview:  Anxiety- stopped fluoxetine due to increased LFT and will start citalopram.    Orders:  -     clonazePAM (KLONOPIN) 1 MG tablet; Take 1 tablet (1 mg total) by mouth 2 (two) times daily. as needed for anxiety.  Dispense: 60 tablet; Refill: 0    3. Hepatic steatosis  Overview:  Enlarged fatty liver. Smokes marijuana and takes celexa. Elevated LFTs, Stated she has been smoking more than normal.   Referral to GI was completed.    Orders:  -     Ambulatory referral/consult to Gastroenterology; Future; Expected date: 09/24/2024         Follow up in about 2 weeks (around 10/1/2024) for routine follow up. In addition to their scheduled follow up, the patient has also been instructed to follow up on as needed basis.     Future Appointments   Date Time Provider Department Center   11/6/2024  9:30 AM RESIDENT 2, [facility] ORTHOPEDICS [facility] ORTHO [place]    3/31/2025  9:00 AM Cox, Lori, FNP [facility] FAMMED [facility]        Video Time Documentation:  Spent 10 minutes with patient face to face discussed health concerns. More than 50% of this time was spent in counseling and coordination of care.

## 2024-09-18 LAB — HBV CORE AB SERPL QL IA: NONREACTIVE

## 2024-10-12 DIAGNOSIS — I10 HYPERTENSION, UNSPECIFIED TYPE: ICD-10-CM

## 2024-10-18 DIAGNOSIS — F41.9 ANXIETY: ICD-10-CM

## 2024-10-18 RX ORDER — CLONAZEPAM 1 MG/1
1 TABLET ORAL 2 TIMES DAILY
Qty: 60 TABLET | Refills: 0 | Status: SHIPPED | OUTPATIENT
Start: 2024-10-18

## 2024-10-21 RX ORDER — METOPROLOL SUCCINATE 25 MG/1
25 TABLET, EXTENDED RELEASE ORAL
Qty: 90 TABLET | Refills: 1 | Status: SHIPPED | OUTPATIENT
Start: 2024-10-21

## 2024-11-01 ENCOUNTER — LAB VISIT (OUTPATIENT)
Dept: LAB | Facility: HOSPITAL | Age: 54
End: 2024-11-01
Attending: NURSE PRACTITIONER
Payer: MEDICAID

## 2024-11-01 ENCOUNTER — OFFICE VISIT (OUTPATIENT)
Dept: GASTROENTEROLOGY | Facility: CLINIC | Age: 54
End: 2024-11-01
Payer: MEDICAID

## 2024-11-01 VITALS
HEART RATE: 55 BPM | SYSTOLIC BLOOD PRESSURE: 147 MMHG | WEIGHT: 232 LBS | TEMPERATURE: 99 F | BODY MASS INDEX: 37.28 KG/M2 | HEIGHT: 66 IN | DIASTOLIC BLOOD PRESSURE: 87 MMHG | OXYGEN SATURATION: 95 % | RESPIRATION RATE: 16 BRPM

## 2024-11-01 DIAGNOSIS — Z12.11 SCREENING FOR COLON CANCER: ICD-10-CM

## 2024-11-01 DIAGNOSIS — K76.0 HEPATIC STEATOSIS: ICD-10-CM

## 2024-11-01 DIAGNOSIS — K64.8 INTERNAL HEMORRHOIDS WITHOUT COMPLICATION: ICD-10-CM

## 2024-11-01 DIAGNOSIS — R74.8 ELEVATED LIVER ENZYMES: ICD-10-CM

## 2024-11-01 DIAGNOSIS — K59.04 CHRONIC IDIOPATHIC CONSTIPATION: ICD-10-CM

## 2024-11-01 DIAGNOSIS — R79.89 ELEVATED FERRITIN LEVEL: Primary | ICD-10-CM

## 2024-11-01 DIAGNOSIS — R74.8 ELEVATED LIVER ENZYMES: Primary | ICD-10-CM

## 2024-11-01 LAB
ALBUMIN SERPL-MCNC: 3.7 G/DL (ref 3.5–5)
ALBUMIN/GLOB SERPL: 0.8 RATIO (ref 1.1–2)
ALP SERPL-CCNC: 83 UNIT/L (ref 40–150)
ALT SERPL-CCNC: 70 UNIT/L (ref 0–55)
ANION GAP SERPL CALC-SCNC: 8 MEQ/L
APTT PPP: 31.5 SECONDS (ref 23.2–33.7)
AST SERPL-CCNC: 52 UNIT/L (ref 5–34)
BILIRUB SERPL-MCNC: 0.9 MG/DL
BUN SERPL-MCNC: 18 MG/DL (ref 9.8–20.1)
CALCIUM SERPL-MCNC: 10 MG/DL (ref 8.4–10.2)
CHLORIDE SERPL-SCNC: 106 MMOL/L (ref 98–107)
CO2 SERPL-SCNC: 26 MMOL/L (ref 22–29)
CREAT SERPL-MCNC: 0.84 MG/DL (ref 0.55–1.02)
CREAT/UREA NIT SERPL: 21
FERRITIN SERPL-MCNC: 358.16 NG/ML (ref 4.63–204)
GFR SERPLBLD CREATININE-BSD FMLA CKD-EPI: >60 ML/MIN/1.73/M2
GLOBULIN SER-MCNC: 4.6 GM/DL (ref 2.4–3.5)
GLUCOSE SERPL-MCNC: 155 MG/DL (ref 74–100)
HAV IGM SERPL QL IA: NONREACTIVE
HBV CORE IGM SERPL QL IA: NONREACTIVE
HBV SURFACE AG SERPL QL IA: NONREACTIVE
HCV AB SERPL QL IA: NONREACTIVE
INR PPP: 1
IRON SATN MFR SERPL: 33 % (ref 20–50)
IRON SERPL-MCNC: 102 UG/DL (ref 50–170)
POTASSIUM SERPL-SCNC: 4 MMOL/L (ref 3.5–5.1)
PROT SERPL-MCNC: 8.3 GM/DL (ref 6.4–8.3)
PROTHROMBIN TIME: 13.2 SECONDS (ref 11.4–14)
SODIUM SERPL-SCNC: 140 MMOL/L (ref 136–145)
TIBC SERPL-MCNC: 208 UG/DL (ref 70–310)
TIBC SERPL-MCNC: 310 UG/DL (ref 250–450)
TRANSFERRIN SERPL-MCNC: 270 MG/DL (ref 180–382)

## 2024-11-01 PROCEDURE — 80053 COMPREHEN METABOLIC PANEL: CPT

## 2024-11-01 PROCEDURE — 86709 HEPATITIS A IGM ANTIBODY: CPT

## 2024-11-01 PROCEDURE — 86376 MICROSOMAL ANTIBODY EACH: CPT

## 2024-11-01 PROCEDURE — 86705 HEP B CORE ANTIBODY IGM: CPT

## 2024-11-01 PROCEDURE — 85610 PROTHROMBIN TIME: CPT

## 2024-11-01 PROCEDURE — 83516 IMMUNOASSAY NONANTIBODY: CPT

## 2024-11-01 PROCEDURE — 83550 IRON BINDING TEST: CPT

## 2024-11-01 PROCEDURE — 86039 ANTINUCLEAR ANTIBODIES (ANA): CPT

## 2024-11-01 PROCEDURE — 86803 HEPATITIS C AB TEST: CPT

## 2024-11-01 PROCEDURE — 86381 MITOCHONDRIAL ANTIBODY EACH: CPT

## 2024-11-01 PROCEDURE — 82728 ASSAY OF FERRITIN: CPT

## 2024-11-01 PROCEDURE — 99215 OFFICE O/P EST HI 40 MIN: CPT | Mod: PBBFAC | Performed by: NURSE PRACTITIONER

## 2024-11-01 PROCEDURE — 36415 COLL VENOUS BLD VENIPUNCTURE: CPT

## 2024-11-01 PROCEDURE — 82390 ASSAY OF CERULOPLASMIN: CPT

## 2024-11-01 PROCEDURE — 82103 ALPHA-1-ANTITRYPSIN TOTAL: CPT

## 2024-11-01 PROCEDURE — 87340 HEPATITIS B SURFACE AG IA: CPT

## 2024-11-01 PROCEDURE — 85730 THROMBOPLASTIN TIME PARTIAL: CPT

## 2024-11-01 PROCEDURE — 86364 TISS TRNSGLTMNASE EA IG CLAS: CPT

## 2024-11-01 RX ORDER — HYDROCODONE BITARTRATE AND ACETAMINOPHEN 10; 325 MG/1; MG/1
1 TABLET ORAL EVERY 6 HOURS PRN
COMMUNITY
Start: 2024-10-23

## 2024-11-01 RX ORDER — METOPROLOL TARTRATE 25 MG/1
1 TABLET, FILM COATED ORAL EVERY MORNING
COMMUNITY

## 2024-11-01 RX ORDER — LUBIPROSTONE 24 UG/1
24 CAPSULE ORAL 2 TIMES DAILY
Qty: 60 CAPSULE | Refills: 5 | Status: SHIPPED | OUTPATIENT
Start: 2024-11-01

## 2024-11-01 RX ORDER — ONDANSETRON 4 MG/1
4 TABLET, FILM COATED ORAL EVERY 6 HOURS PRN
COMMUNITY
Start: 2024-09-18

## 2024-11-01 NOTE — PROGRESS NOTES
Please notify further workup for elevated LFTs unremarkable thus far.  Ferritin level mildly elevated and for this, I have added HFE testing.  Please see if lab can add this from lab work drawn today.  Will follow remaining results once received and reviewed.  Thanks

## 2024-11-02 LAB
LKM-1 IGG SER IA-ACNC: 0.8 U
MITOCHONDRIA M2 AB SER IA-ACNC: <0.1 U
TTG IGA SER IA-ACNC: <1.02 FLU

## 2024-11-03 LAB — CERULOPLASMIN SERPL-MCNC: 30.2 MG/DL (ref 20–51)

## 2024-11-04 LAB
A1AT SERPL NEPH-MCNC: 181 MG/DL (ref 100–190)
SMA IGG SER-ACNC: 6.2 U

## 2024-11-06 LAB
ANTINUCLEAR ANTIBODY SCREEN (OHS): NEGATIVE
DSDNA AB QUANT (OHS): 1 IU/ML

## 2024-11-15 ENCOUNTER — LAB VISIT (OUTPATIENT)
Dept: LAB | Facility: HOSPITAL | Age: 54
End: 2024-11-15
Attending: NURSE PRACTITIONER
Payer: MEDICAID

## 2024-11-15 DIAGNOSIS — R79.89 ELEVATED FERRITIN LEVEL: ICD-10-CM

## 2024-11-15 PROCEDURE — 36415 COLL VENOUS BLD VENIPUNCTURE: CPT

## 2024-11-15 PROCEDURE — 81256 HFE GENE: CPT

## 2024-11-19 LAB
GENETIC VARIANT DETAILS BLD/T: NORMAL
GENETICIST REVIEW: NORMAL
LAB TEST METHOD: NORMAL
MOL DX INTERP BLD/T QL: NEGATIVE
PROVIDER SIGNING NAME: NORMAL
SPECIMEN SOURCE: NORMAL

## 2024-12-04 DIAGNOSIS — F41.9 ANXIETY: ICD-10-CM

## 2024-12-04 DIAGNOSIS — E11.9 TYPE 2 DIABETES MELLITUS WITHOUT COMPLICATION, WITHOUT LONG-TERM CURRENT USE OF INSULIN: ICD-10-CM

## 2024-12-04 RX ORDER — SEMAGLUTIDE 0.68 MG/ML
0.5 INJECTION, SOLUTION SUBCUTANEOUS
Qty: 3 ML | Refills: 3 | Status: SHIPPED | OUTPATIENT
Start: 2024-12-04

## 2024-12-04 RX ORDER — CLONAZEPAM 1 MG/1
1 TABLET ORAL 2 TIMES DAILY
Qty: 60 TABLET | Refills: 0 | Status: SHIPPED | OUTPATIENT
Start: 2024-12-04

## 2025-02-11 ENCOUNTER — OFFICE VISIT (OUTPATIENT)
Dept: FAMILY MEDICINE | Facility: CLINIC | Age: 55
End: 2025-02-11
Payer: MEDICAID

## 2025-02-11 VITALS
DIASTOLIC BLOOD PRESSURE: 84 MMHG | HEART RATE: 74 BPM | HEIGHT: 66 IN | WEIGHT: 231.94 LBS | TEMPERATURE: 98 F | BODY MASS INDEX: 37.28 KG/M2 | SYSTOLIC BLOOD PRESSURE: 125 MMHG | RESPIRATION RATE: 16 BRPM | OXYGEN SATURATION: 98 %

## 2025-02-11 DIAGNOSIS — L73.9 FOLLICULITIS: ICD-10-CM

## 2025-02-11 DIAGNOSIS — E11.9 TYPE 2 DIABETES MELLITUS WITHOUT COMPLICATION, WITHOUT LONG-TERM CURRENT USE OF INSULIN: Primary | ICD-10-CM

## 2025-02-11 DIAGNOSIS — F41.9 ANXIETY: ICD-10-CM

## 2025-02-11 PROCEDURE — G2211 COMPLEX E/M VISIT ADD ON: HCPCS | Mod: S$PBB,,, | Performed by: NURSE PRACTITIONER

## 2025-02-11 PROCEDURE — 3074F SYST BP LT 130 MM HG: CPT | Mod: CPTII,,, | Performed by: NURSE PRACTITIONER

## 2025-02-11 PROCEDURE — 99215 OFFICE O/P EST HI 40 MIN: CPT | Mod: PBBFAC | Performed by: NURSE PRACTITIONER

## 2025-02-11 PROCEDURE — 3008F BODY MASS INDEX DOCD: CPT | Mod: CPTII,,, | Performed by: NURSE PRACTITIONER

## 2025-02-11 PROCEDURE — 1159F MED LIST DOCD IN RCRD: CPT | Mod: CPTII,,, | Performed by: NURSE PRACTITIONER

## 2025-02-11 PROCEDURE — 99213 OFFICE O/P EST LOW 20 MIN: CPT | Mod: S$PBB,,, | Performed by: NURSE PRACTITIONER

## 2025-02-11 PROCEDURE — 3079F DIAST BP 80-89 MM HG: CPT | Mod: CPTII,,, | Performed by: NURSE PRACTITIONER

## 2025-02-11 PROCEDURE — 1160F RVW MEDS BY RX/DR IN RCRD: CPT | Mod: CPTII,,, | Performed by: NURSE PRACTITIONER

## 2025-02-11 PROCEDURE — 99999 PR PBB SHADOW E&M-EST. PATIENT-LVL V: CPT | Mod: PBBFAC,,, | Performed by: NURSE PRACTITIONER

## 2025-02-11 RX ORDER — CEPHALEXIN 500 MG/1
500 CAPSULE ORAL EVERY 12 HOURS
Qty: 20 CAPSULE | Refills: 0 | Status: SHIPPED | OUTPATIENT
Start: 2025-02-11

## 2025-02-11 RX ORDER — CITALOPRAM 20 MG/1
20 TABLET, FILM COATED ORAL DAILY
Qty: 30 TABLET | Refills: 11 | Status: SHIPPED | OUTPATIENT
Start: 2025-02-11 | End: 2026-02-11

## 2025-02-11 RX ORDER — MUPIROCIN 20 MG/G
OINTMENT TOPICAL 3 TIMES DAILY
Qty: 22 G | Refills: 3 | Status: SHIPPED | OUTPATIENT
Start: 2025-02-11

## 2025-02-11 RX ORDER — FLUCONAZOLE 150 MG/1
150 TABLET ORAL DAILY
Qty: 2 TABLET | Refills: 0 | Status: SHIPPED | OUTPATIENT
Start: 2025-02-11 | End: 2025-02-13

## 2025-02-11 NOTE — PROGRESS NOTES
Patient ID: Raquel Byrne is a 54 y.o. female.    Chief Complaint: Rash      History of Present Illness    CHIEF COMPLAINT:  Patient presents today with widespread rash    SKIN CONCERNS:  She presents with multiple skin lesions that have progressed to boils. She reports blue-colored lesions on the neck that appeared yesterday. She also notes multiple skin tags with associated blackheads and non-removable scabs.    ENT SYMPTOMS:  She reports a persistent malodor in nose, describing it as a smell of feces or decay lasting for approximately one week. She also notes bloody discharge when blowing her nose.    MENTAL HEALTH:  She reports increased nervousness, frequent episodes of crying, and significant irritability. Her current medications, including Celexa (citalopram), are not effectively controlling her shaking or other mental health symptoms.    BLOOD SUGAR:  Her blood sugar typically range between . She reports a single recent elevation to 176, which she states is unusual and has not occurred in months.    MUSCULOSKELETAL:  She reports ongoing knee pain three months post-surgery. Despite current discomfort, she acknowledges that recovery is still in progress.    MIGRAINES:  Her migraines have been well-controlled recently with no frequent occurrences. She experienced one migraine episode a few weeks ago that lasted for a few days, which has since resolved.      ROS:  General: -fever, -chills, -fatigue, -weight gain, -weight loss  Eyes: -vision changes, -redness, -discharge  ENT: -ear pain, -nasal congestion, -sore throat, +nasal discharge  Cardiovascular: -chest pain, -palpitations, -lower extremity edema  Respiratory: -cough, -shortness of breath  Gastrointestinal: -abdominal pain, -nausea, -vomiting, -diarrhea, -constipation, -blood in stool  Genitourinary: -dysuria, -hematuria, -frequency  Musculoskeletal: +joint pain, -muscle pain  Skin: +rash, +lesion  Neurological: -headache, -dizziness, -numbness,  "-tingling  Psychiatric: -anxiety, -depression, -sleep difficulty, +emotional lability       /84   Pulse 74   Temp 98 °F (36.7 °C) (Oral)   Resp 16   Ht 5' 6" (1.676 m)   Wt 105.2 kg (231 lb 14.8 oz)   SpO2 98%   BMI 37.43 kg/m²      Physical Exam    General: No acute distress. Well-developed. Well-nourished.  Eyes: EOMI. Sclerae anicteric.  HENT: Normocephalic. Atraumatic. Nares patent. Moist oral mucosa.  Ears: Bilateral TMs clear. Bilateral EACs clear.  Cardiovascular: Regular rate. Regular rhythm. No murmurs. No rubs. No gallops. Normal S1, S2.  Respiratory: Normal respiratory effort. Clear to auscultation bilaterally. No rales. No rhonchi. No wheezing.  Abdomen: Soft. Non-tender. Non-distended. Normoactive bowel sounds.  Musculoskeletal: No  obvious deformity.  Extremities: No lower extremity edema.  Neurological: Alert & oriented x3. No slurred speech. Normal gait.  Psychiatric: Normal mood. Normal affect. Good insight. Good judgment.  Skin: Warm. Dry. No rash.         1. Type 2 diabetes mellitus without complication, without long-term current use of insulin  Overview:  , did eat, will increase her dose of metformin and ozempic. Advised to check morning glucose.   Lab Results   Component Value Date    HGBA1C 7.8 (H) 07/01/2024    HGBA1C 6.9 03/28/2024    .00 07/01/2024    CREATININE 0.83 07/01/2024      Diabetes Medications               metFORMIN (GLUCOPHAGE) 1000 MG tablet Take 1 tablet (1,000 mg total) by mouth 2 (two) times daily with meals.    OZEMPIC 0.25 mg or 0.5 mg (2 mg/3 mL) pen injector Inject 0.5 mg into the skin every 7 days.          On ACE and Statin according to guidelines.  Discussed caution with SGLT2s + diuretics as concomitant use can cause volume depletion. Discussed caution with DPP-Ivs and HF risk.  Follow ADA Diet. Avoid soda, simple sweets, and limit rice/pasta/breads/starches (no more than 45-50 grams per meal).  Maintain healthy weight with goal BMI <30.  " Exercise 5 times per week for 30 minutes per day.  Stressed importance of daily foot exams.  Stressed importance of annual dilated eye exam.          Assessment & Plan:  Noted BG is 70s to 170s. Continue the metformin.    Orders:  -     Diabetic Eye Screening Photo; Future  -     Hemoglobin A1C; Future; Expected date: 02/11/2025    2. Folliculitis  Overview:  Generalized, open with various stages of wound healing. Notes no other family or friend has lesions. Painful and burning.  Culture obtained.    Orders:  -     Wound Culture  -     cephALEXin (KEFLEX) 500 MG capsule; Take 1 capsule (500 mg total) by mouth every 12 (twelve) hours.  Dispense: 20 capsule; Refill: 0  -     mupirocin (BACTROBAN) 2 % ointment; Apply topically 3 (three) times daily.  Dispense: 22 g; Refill: 3  -     fluconazole (DIFLUCAN) 150 MG Tab; Take 1 tablet (150 mg total) by mouth once daily. for 2 days  Dispense: 2 tablet; Refill: 0    3. Anxiety  Overview:  Anxiety- citalopram dose increased to 20mg.    Orders:  -     citalopram (CELEXA) 20 MG tablet; Take 1 tablet (20 mg total) by mouth once daily.  Dispense: 30 tablet; Refill: 11       Assessment & Plan    L98.9 Disorder of the skin and subcutaneous tissue, unspecified  F41.9 Anxiety disorder, unspecified  F33.9 Major depressive disorder, recurrent, unspecified  E11.65 Type 2 diabetes mellitus with hyperglycemia  M25.569 Pain in unspecified knee  L91.8 Other hypertrophic disorders of the skin  G43.909 Migraine, unspecified, not intractable, without status migrainosus  Z86.14 Personal history of Methicillin resistant Staphylococcus aureus infection  L02.91 Cutaneous abscess, unspecified  Z79.2 Long term (current) use of antibiotics    IMPRESSION:  Increased Celexa (citalopram) dose from 10mg to 20mg due to patient reporting ineffectiveness of current dose and persistent irritability  Suspect MRSA infection based on patient's widespread rash and history of boils  Prescribed Keflex (cephalexin)  for potential MRSA infection  Recommend Mupirocin ointment application, particularly in nasal area, to address suspected MRSA  Prescribed antifungal medication prophylactically due to patient's history of yeast infections with antibiotic use    MRSA INFECTION:  - Suspected MRSA infection based on the patient's symptoms.  - Prescribed cephalexin 500mg twice daily for 10 days.  - Prescribed Mupirocin ointment to be applied to affected areas and inside nostrils.  - Recommend bleach bath with Epsom salt for skin treatment.  - Explained MRSA (methicillin-resistant Staphylococcus aureus) as a type of antibiotic-resistant infection.  - Suspected current MRSA infection based on the patient's symptoms and history.  - Discussed the importance of proper application technique for Mupirocin to avoid contamination.  - Prescribed Mupirocin ointment: Apply to affected areas and inside nostrils using Q-tips, avoiding contamination of the tube.  - Prescribed cephalexin 500mg: Take 1 tablet in the morning and 1 tablet in the evening for 10 days.  - Noted patient's history of boils.  - Recommend bleach bath with Epsom salt for skin treatment.  - Take bleach baths: Add 1/4 cup of bleach and Epsom salt to bathwater, soak body (avoiding face and hair).    ANXIETY AND DEPRESSION:  - Noted increased nervousness and irritability reported by the patient.  - Increased citalopram dosage from 10mg to 20mg daily to manage anxiety symptoms.  - Observed increased crying and irritability reported by the patient.  - Evaluated current citalopram treatment as ineffective.  - Increased citalopram dosage from 10mg to 20mg daily to address depressive symptoms.    DIABETES:  - Monitored patient's blood sugar, which ranged from 85 to 176 mg/dL.    KNEE PAIN:  - Noted ongoing knee pain reported by the patient post-surgery.    SKIN TAGS:  - Observed multiple skin tags on the patient.    MIGRAINES:  - Noted recent migraine episodes reported by the  patient.    MEDICATIONS/SUPPLEMENTS:  - Prescribed antifungal medication: Take 1 pill on day 5 of antibiotic course and 1 pill at the end of the course (total of 2 doses).  - Prescribed antifungal medication to prevent yeast infection during antibiotic treatment.    OTHER INSTRUCTIONS:  - Observed widespread rash, skin tags, blue-colored lesions, and possible blackheads on the patient's neck.              Follow up in about 1 week (around 2/18/2025) for add follow up- wound .    This note was generated with the assistance of ambient listening technology. Verbal consent was obtained by the patient and accompanying visitor(s) for the recording of patient appointment to facilitate this note. I attest to having reviewed and edited the generated note for accuracy, though some syntax or spelling errors may persist. Please contact the author of this note for any clarification.

## 2025-02-12 PROCEDURE — 87184 SC STD DISK METHOD PER PLATE: CPT | Performed by: NURSE PRACTITIONER

## 2025-02-15 LAB — BACTERIA WND CULT: ABNORMAL

## 2025-02-17 ENCOUNTER — RESULTS FOLLOW-UP (OUTPATIENT)
Dept: FAMILY MEDICINE | Facility: CLINIC | Age: 55
End: 2025-02-17
Payer: MEDICAID

## 2025-02-17 DIAGNOSIS — N49.1 FUNICULITIS: Primary | ICD-10-CM

## 2025-02-17 RX ORDER — DOXYCYCLINE 100 MG/1
100 CAPSULE ORAL EVERY 12 HOURS
Qty: 20 CAPSULE | Refills: 0 | Status: SHIPPED | OUTPATIENT
Start: 2025-02-17

## 2025-02-18 ENCOUNTER — OFFICE VISIT (OUTPATIENT)
Dept: FAMILY MEDICINE | Facility: CLINIC | Age: 55
End: 2025-02-18
Payer: MEDICAID

## 2025-02-18 VITALS
RESPIRATION RATE: 16 BRPM | OXYGEN SATURATION: 98 % | HEIGHT: 66 IN | WEIGHT: 216.19 LBS | TEMPERATURE: 98 F | DIASTOLIC BLOOD PRESSURE: 87 MMHG | HEART RATE: 75 BPM | SYSTOLIC BLOOD PRESSURE: 137 MMHG | BODY MASS INDEX: 34.74 KG/M2

## 2025-02-18 DIAGNOSIS — L73.9 FOLLICULITIS: Primary | ICD-10-CM

## 2025-02-18 PROBLEM — M62.838 MUSCLE SPASM: Status: RESOLVED | Noted: 2024-05-09 | Resolved: 2025-02-18

## 2025-02-18 PROBLEM — K64.8 INTERNAL HEMORRHOIDS WITHOUT COMPLICATION: Status: RESOLVED | Noted: 2024-11-01 | Resolved: 2025-02-18

## 2025-02-18 PROBLEM — Z12.11 SCREENING FOR COLON CANCER: Status: RESOLVED | Noted: 2023-04-10 | Resolved: 2025-02-18

## 2025-02-18 PROBLEM — R11.2 NAUSEA AND VOMITING: Status: RESOLVED | Noted: 2023-07-26 | Resolved: 2025-02-18

## 2025-02-18 PROBLEM — R10.9 ABDOMINAL PAIN: Status: RESOLVED | Noted: 2023-07-26 | Resolved: 2025-02-18

## 2025-02-18 PROBLEM — Z12.4 CERVICAL CANCER SCREENING: Status: RESOLVED | Noted: 2023-07-26 | Resolved: 2025-02-18

## 2025-02-18 PROBLEM — B37.89 CANDIDIASIS OF BREAST: Status: RESOLVED | Noted: 2023-07-26 | Resolved: 2025-02-18

## 2025-02-18 PROCEDURE — 99215 OFFICE O/P EST HI 40 MIN: CPT | Mod: PBBFAC | Performed by: NURSE PRACTITIONER

## 2025-02-18 RX ORDER — RIFAMPIN 300 MG/1
300 CAPSULE ORAL EVERY 12 HOURS
Qty: 20 CAPSULE | Refills: 0 | Status: SHIPPED | OUTPATIENT
Start: 2025-02-18

## 2025-02-18 NOTE — PROGRESS NOTES
"Patient ID: Raquel Byrne is a 54 y.o. female.    Chief Complaint: Follow-up      History of Present Illness    CHIEF COMPLAINT:  Patient presents today with skin lesions and sores    SKIN CONCERNS:  She reports black lesions on hands appearing like small hairs with associated white cores that are painful when extracted. The lesions are spreading to contralateral side and now appearing under fingernails. She has a history of boils.    MEDICAL HISTORY:  She has a history of liver issues and previous gallbladder issues that were resolved with surgery.    WEIGHT MANAGEMENT:  She reports weight loss from 242 to 216 lbs since starting treatment.    FATIGUE:  She reports experiencing fatigue, which she attributes to mental exhaustion from caring for multiple family members including her mother, children, and grandchildren.      ROS:  General: -fever, -chills, +fatigue, -weight gain, +weight loss  Eyes: -vision changes, -redness, -discharge  ENT: -ear pain, -nasal congestion, -sore throat  Cardiovascular: -chest pain, -palpitations, -lower extremity edema  Respiratory: -cough, -shortness of breath  Gastrointestinal: -abdominal pain, -nausea, -vomiting, -diarrhea, -constipation, -blood in stool  Genitourinary: -dysuria, -hematuria, -frequency  Musculoskeletal: -joint pain, -muscle pain  Skin: -rash, +lesion  Neurological: -headache, -dizziness, -numbness, -tingling  Psychiatric: -anxiety, -depression, -sleep difficulty       /87 (BP Location: Left arm, Patient Position: Sitting)   Pulse 75   Temp 98.2 °F (36.8 °C) (Temporal)   Resp 16   Ht 5' 6" (1.676 m)   Wt 98.1 kg (216 lb 3.2 oz)   SpO2 98%   BMI 34.90 kg/m²      Physical Exam    Vitals: Weight: 216 lbs.  General: No acute distress. Well-developed. Well-nourished.  Eyes: EOMI. Sclerae anicteric. Cholesterol deposits on eyelid.  HENT: Normocephalic. Atraumatic. Nares patent. Moist oral mucosa.  Ears: Bilateral TMs clear. Bilateral EACs " clear.  Cardiovascular: Regular rate. Regular rhythm. No murmurs. No rubs. No gallops. Normal S1, S2.  Respiratory: Normal respiratory effort. Clear to auscultation bilaterally. No rales. No rhonchi. No wheezing.  Abdomen: Soft. Non-tender. Non-distended. Normoactive bowel sounds.  Musculoskeletal: No  obvious deformity.  Extremities: No lower extremity edema.  Neurological: Alert & oriented x3. No slurred speech. Normal gait.  Psychiatric: Normal mood. Normal affect. Good insight. Good judgment.  Skin: Warm. Dry. No rash. Black things under fingernails.         1. Folliculitis  Overview:  Generalized, open with various stages of wound healing. Notes no other family or friend has lesions. Painful and burning.  Culture obtained.    Assessment & Plan:  Improved, continues to have some coming out, doxy is added, stopped the bactrim,     Orders:  -     rifAMpin (RIFADIN) 300 MG capsule; Take 1 capsule (300 mg total) by mouth every 12 (twelve) hours.  Dispense: 20 capsule; Refill: 0  -     CBC Auto Differential; Future; Expected date: 02/18/2025  -     Comprehensive Metabolic Panel; Future; Expected date: 02/18/2025  -     Lipid Panel; Future; Expected date: 02/18/2025  -     TSH; Future; Expected date: 02/18/2025  -     Hemoglobin A1C; Future; Expected date: 02/18/2025  -     Urinalysis; Future; Expected date: 02/18/2025       Assessment & Plan    A49.01 Methicillin susceptible Staphylococcus aureus infection, unspecified site  L02.92 Furuncle, unspecified  H02.60 Xanthelasma of unspecified eye, unspecified eyelid  E78.5 Hyperlipidemia, unspecified  R63.4 Abnormal weight loss  B86 Scabies    IMPRESSION:  Assessed skin lesions, suspected MRSA infection based on appearance and culture results  Considered differential diagnosis of scabies but ruled out due to atypical distribution of lesions  Evaluated cholesterol deposits on eyelids, indicative of potential hypercholesterolemia  Reviewed antibiotic sensitivity results,  noting tetracycline/doxycycline sensitivity  Will add rifampin to current doxycycline regimen to improve efficacy against MRSA  Considered potential need for IV antibiotics (vancomycin) if oral regimen proves ineffective    MRSA (METHICILLIN-RESISTANT STAPHYLOCOCCUS AUREUS):  - Examined multiple sores and lesions on various parts of the patient's body, including hands, fingers, and other areas.  - Performed wound culture, which showed Mini Methicillin Resistant Staphylococcus Aureus, sensitive to tetracycline (doxycycline).  - Assessed the lesions and noted they are healing and looking better, but new ones are still appearing.  - Diagnosed the condition as MRSA (Methicillin-resistant Staphylococcus aureus) based on the distribution and appearance of the lesions.  - Prescribed doxycycline 100mg twice daily for 10 days.  - Prescribed rifampin 300mg every 12 hours for 10 days, to be taken at the same time as doxycycline.  - Continued intranasal antibiotic application.  - Advised the patient to use Hibiclens soap every other day and to use antibacterial pump soap instead of bar soap.  - Instructed the patient to avoid shaving and to wash hands after touching affected areas.  - Educated the patient about MRSA infection, its contagious nature, importance of proper hygiene, and potential sources including community settings.  - Emphasized the importance of completing the full course of antibiotics.  - Warned about potential urine discoloration due to rifampin.  - Recommend discontinuing use of bar soap and switching to pump dispenser antibacterial soap.  - Recommend washing hands thoroughly after touching any lesions.  - Patient to wear gloves when in contact with others to prevent spread of infection.    FURUNCLES (BOILS):  - Examined multiple painful, pus-filled lesions on various parts of the patient's body.  - Assessed the lesions as consistent with furuncles (boils) caused by MRSA infection.  - Determined that the  condition is related to the MRSA infection and requires antibiotic treatment.  - Prescribed the same treatment as for the MRSA infection: doxycycline and rifampin, along with topical antiseptic measures.  - Instructed the patient to avoid scratching lesions to prevent spread of infection.  - Advised the patient to use Hibiclens soap every other day for cleansing affected areas.  - Instructed the patient not to shave affected areas.    XANTHELASMA:  - Identified a yellowish deposit on the patient's eyelid as xanthelasma.  - Visually confirmed the presence of xanthelasma on the patient's eyelid.  - Associated the xanthelasma with potential high cholesterol.  - Explained cholesterol deposits on eyelids and their relation to hypercholesterolemia.  - Planned to check cholesterol levels as part of the treatment approach.  - Noted the presence of xanthelasma on the eyelid, which is associated with high cholesterol.  - Planned to check cholesterol levels due to the presence of xanthelasma.  - Ordered labs to check cholesterol levels.  - Explained the relationship between xanthelasma and hypercholesterolemia to the patient.    WEIGHT MANAGEMENT:  - Noted the patient's weight has decreased from 242 lbs to 216 lbs.  - Evaluated the significant weight loss and inquired   about the patient's well-being.  - Acknowledged the weight loss and attributed tiredness to the patient's busy lifestyle.    ABDOMINAL PAIN:  - Referred to GI specialist for persistent abdominal pain.    LABS:  - Ordered lab work to check kidney function and other parameters.    FOLLOW UP:  - Instructed the patient to follow up in 2 days (Thursday) if condition does not improve.  - Advised the patient to contact the office if no improvement by Thursday, to be directed to the emergency room for further treatment.  - Follow up to complete fibroscan when current infection clears.              No follow-ups on file.    This note was generated with the assistance of  ambient listening technology. Verbal consent was obtained by the patient and accompanying visitor(s) for the recording of patient appointment to facilitate this note. I attest to having reviewed and edited the generated note for accuracy, though some syntax or spelling errors may persist. Please contact the author of this note for any clarification.

## 2025-02-19 ENCOUNTER — LAB VISIT (OUTPATIENT)
Dept: LAB | Facility: HOSPITAL | Age: 55
End: 2025-02-19
Attending: NURSE PRACTITIONER
Payer: MEDICAID

## 2025-02-19 DIAGNOSIS — L73.9 FOLLICULITIS: ICD-10-CM

## 2025-02-19 LAB
ALBUMIN SERPL-MCNC: 3.8 G/DL (ref 3.5–5)
ALBUMIN/GLOB SERPL: 0.7 RATIO (ref 1.1–2)
ALP SERPL-CCNC: 86 UNIT/L (ref 40–150)
ALT SERPL-CCNC: 44 UNIT/L (ref 0–55)
ANION GAP SERPL CALC-SCNC: 7 MEQ/L
AST SERPL-CCNC: 39 UNIT/L (ref 5–34)
BASOPHILS # BLD AUTO: 0.04 X10(3)/MCL
BASOPHILS NFR BLD AUTO: 0.8 %
BILIRUB SERPL-MCNC: 0.9 MG/DL
BILIRUB UR QL STRIP.AUTO: NEGATIVE
BUN SERPL-MCNC: 15 MG/DL (ref 9.8–20.1)
CALCIUM SERPL-MCNC: 9.9 MG/DL (ref 8.4–10.2)
CHLORIDE SERPL-SCNC: 104 MMOL/L (ref 98–107)
CHOLEST SERPL-MCNC: 205 MG/DL
CHOLEST/HDLC SERPL: 3 {RATIO} (ref 0–5)
CLARITY UR: CLEAR
CO2 SERPL-SCNC: 28 MMOL/L (ref 22–29)
COLOR UR AUTO: YELLOW
CREAT SERPL-MCNC: 0.75 MG/DL (ref 0.55–1.02)
CREAT/UREA NIT SERPL: 20
EOSINOPHIL # BLD AUTO: 0.18 X10(3)/MCL (ref 0–0.9)
EOSINOPHIL NFR BLD AUTO: 3.4 %
ERYTHROCYTE [DISTWIDTH] IN BLOOD BY AUTOMATED COUNT: 12.4 % (ref 11.5–17)
EST. AVERAGE GLUCOSE BLD GHB EST-MCNC: 119.8 MG/DL
GFR SERPLBLD CREATININE-BSD FMLA CKD-EPI: >60 ML/MIN/1.73/M2
GLOBULIN SER-MCNC: 5.3 GM/DL (ref 2.4–3.5)
GLUCOSE SERPL-MCNC: 96 MG/DL (ref 74–100)
GLUCOSE UR QL STRIP: NEGATIVE
HBA1C MFR BLD: 5.8 %
HCT VFR BLD AUTO: 45.8 % (ref 37–47)
HDLC SERPL-MCNC: 64 MG/DL (ref 35–60)
HGB BLD-MCNC: 15.3 G/DL (ref 12–16)
HGB UR QL STRIP: NEGATIVE
IMM GRANULOCYTES # BLD AUTO: 0.03 X10(3)/MCL (ref 0–0.04)
IMM GRANULOCYTES NFR BLD AUTO: 0.6 %
KETONES UR QL STRIP: NEGATIVE
LDLC SERPL CALC-MCNC: 127 MG/DL (ref 50–140)
LEUKOCYTE ESTERASE UR QL STRIP: NEGATIVE
LYMPHOCYTES # BLD AUTO: 1.35 X10(3)/MCL (ref 0.6–4.6)
LYMPHOCYTES NFR BLD AUTO: 25.7 %
MCH RBC QN AUTO: 32.7 PG (ref 27–31)
MCHC RBC AUTO-ENTMCNC: 33.4 G/DL (ref 33–36)
MCV RBC AUTO: 97.9 FL (ref 80–94)
MONOCYTES # BLD AUTO: 0.52 X10(3)/MCL (ref 0.1–1.3)
MONOCYTES NFR BLD AUTO: 9.9 %
NEUTROPHILS # BLD AUTO: 3.14 X10(3)/MCL (ref 2.1–9.2)
NEUTROPHILS NFR BLD AUTO: 59.6 %
NITRITE UR QL STRIP: NEGATIVE
NRBC BLD AUTO-RTO: 0 %
PH UR STRIP: 5.5 [PH]
PLATELET # BLD AUTO: 147 X10(3)/MCL (ref 130–400)
PMV BLD AUTO: 10 FL (ref 7.4–10.4)
POTASSIUM SERPL-SCNC: 4.2 MMOL/L (ref 3.5–5.1)
PROT SERPL-MCNC: 9.1 GM/DL (ref 6.4–8.3)
PROT UR QL STRIP: NEGATIVE
RBC # BLD AUTO: 4.68 X10(6)/MCL (ref 4.2–5.4)
SODIUM SERPL-SCNC: 139 MMOL/L (ref 136–145)
SP GR UR STRIP.AUTO: 1.02 (ref 1–1.03)
TRIGL SERPL-MCNC: 71 MG/DL (ref 37–140)
TSH SERPL-ACNC: 1.63 UIU/ML (ref 0.35–4.94)
UROBILINOGEN UR STRIP-ACNC: 2
VLDLC SERPL CALC-MCNC: 14 MG/DL
WBC # BLD AUTO: 5.26 X10(3)/MCL (ref 4.5–11.5)

## 2025-02-19 PROCEDURE — 83036 HEMOGLOBIN GLYCOSYLATED A1C: CPT

## 2025-02-19 PROCEDURE — 80061 LIPID PANEL: CPT

## 2025-02-19 PROCEDURE — 36415 COLL VENOUS BLD VENIPUNCTURE: CPT

## 2025-02-19 PROCEDURE — 85025 COMPLETE CBC W/AUTO DIFF WBC: CPT

## 2025-02-19 PROCEDURE — 81003 URINALYSIS AUTO W/O SCOPE: CPT

## 2025-02-19 PROCEDURE — 80053 COMPREHEN METABOLIC PANEL: CPT

## 2025-02-19 PROCEDURE — 84443 ASSAY THYROID STIM HORMONE: CPT

## 2025-02-20 ENCOUNTER — LAB VISIT (OUTPATIENT)
Dept: LAB | Facility: HOSPITAL | Age: 55
End: 2025-02-20
Attending: NURSE PRACTITIONER
Payer: MEDICAID

## 2025-02-20 ENCOUNTER — OFFICE VISIT (OUTPATIENT)
Dept: FAMILY MEDICINE | Facility: CLINIC | Age: 55
End: 2025-02-20
Payer: MEDICAID

## 2025-02-20 VITALS
HEIGHT: 66 IN | TEMPERATURE: 97 F | RESPIRATION RATE: 16 BRPM | HEART RATE: 65 BPM | BODY MASS INDEX: 34.72 KG/M2 | DIASTOLIC BLOOD PRESSURE: 80 MMHG | OXYGEN SATURATION: 99 % | SYSTOLIC BLOOD PRESSURE: 138 MMHG | WEIGHT: 216.06 LBS

## 2025-02-20 DIAGNOSIS — D53.9 MACROCYTIC ANEMIA: Primary | ICD-10-CM

## 2025-02-20 DIAGNOSIS — D53.9 MACROCYTIC ANEMIA: ICD-10-CM

## 2025-02-20 DIAGNOSIS — L73.9 FOLLICULITIS: ICD-10-CM

## 2025-02-20 DIAGNOSIS — E16.2 HYPOGLYCEMIA: ICD-10-CM

## 2025-02-20 DIAGNOSIS — R25.1 TREMOR: ICD-10-CM

## 2025-02-20 LAB
FOLATE SERPL-MCNC: 10.1 NG/ML (ref 7–31.4)
VIT B12 SERPL-MCNC: 512 PG/ML (ref 213–816)

## 2025-02-20 PROCEDURE — 84681 ASSAY OF C-PEPTIDE: CPT

## 2025-02-20 PROCEDURE — 82746 ASSAY OF FOLIC ACID SERUM: CPT

## 2025-02-20 PROCEDURE — 99215 OFFICE O/P EST HI 40 MIN: CPT | Mod: PBBFAC | Performed by: NURSE PRACTITIONER

## 2025-02-20 PROCEDURE — 82607 VITAMIN B-12: CPT

## 2025-02-20 PROCEDURE — 36415 COLL VENOUS BLD VENIPUNCTURE: CPT

## 2025-02-20 NOTE — PROGRESS NOTES
"Patient ID: Raquel Byrne is a 54 y.o. female.    Chief Complaint: Wound Check and Results      History of Present Illness    CHIEF COMPLAINT:  Patient presents today for follow up.    DERMATOLOGIC:  She reports bubbles appearing bilaterally on her ears in the same two spots. She has a history of MRSA infection with previously large boils and lumps on her back that have decreased in size. She experiences itching with new spots appearing, including a recent spot on her breast. She also has an asymptomatic growth on her eyelid. She denies any pain or discomfort associated with these skin issues.    MEDICATIONS:  She is currently taking Doxycycline and Rifampin concurrently.    LABS:  White blood cell count has improved. Hemoglobin and hematocrit are normal. MCV and MCH are slightly elevated at 97-98 (normal being 94). Sodium and potassium levels are normal. Creatinine and GFR are within normal range. Protein is slightly elevated. AST remains slightly elevated but has improved. A1C is 5.8. Cholesterol is 205, decreased from previous measurements. HDL is slightly elevated at 64. Triglycerides have significantly improved. TSH is normal.    DIET:  She reports compliance with dietary modifications, specifically avoiding bread.      ROS:  General: -fever, -chills, -fatigue, -weight gain, -weight loss  Eyes: -vision changes, -redness, -discharge  ENT: -ear pain, -nasal congestion, -sore throat  Cardiovascular: -chest pain, -palpitations, -lower extremity edema  Respiratory: -cough, -shortness of breath  Gastrointestinal: -abdominal pain, -nausea, -vomiting, -diarrhea, -constipation, -blood in stool  Genitourinary: -dysuria, -hematuria, -frequency  Musculoskeletal: -joint pain, -muscle pain  Skin: -rash, -lesion, +itching  Neurological: -headache, -dizziness, -numbness, -tingling  Psychiatric: -anxiety, -depression, -sleep difficulty       /80   Pulse 65   Temp 97 °F (36.1 °C) (Temporal)   Resp 16   Ht 5' 6" " (1.676 m)   Wt 98 kg (216 lb 0.8 oz)   SpO2 99%   BMI 34.87 kg/m²      Physical Exam    General: No acute distress. Well-developed. Well-nourished.  Eyes: EOMI. Sclerae anicteric.  HENT: Normocephalic. Atraumatic. Nares patent. Moist oral mucosa.  Ears: Bilateral TMs clear. Bilateral EACs clear.  Cardiovascular: Regular rate. Regular rhythm. No murmurs. No rubs. No gallops. Normal S1, S2.  Respiratory: Normal respiratory effort. Clear to auscultation bilaterally. No rales. No rhonchi. No wheezing.  Abdomen: Soft. Non-tender. Non-distended. Normoactive bowel sounds.  Musculoskeletal: No  obvious deformity.  Extremities: No lower extremity edema.  Neurological: Alert & oriented x3. No slurred speech. Normal gait.  Psychiatric: Normal mood. Normal affect. Good insight. Good judgment.  Skin: Warm. Dry. No rash.         1. Macrocytic anemia  Overview:  MCV 97.9  and MCH 32.7. Will order folic and B12.    Orders:  -     Vitamin B12; Future; Expected date: 02/20/2025  -     Folate; Future; Expected date: 02/20/2025    2. Folliculitis  Overview:  Generalized, open with various stages of wound healing. Notes no other family or friend has lesions. Painful and burning.  Culture obtained.    Assessment & Plan:  Much improved, advised rifampin.       3. Hypoglycemia  -     C-Peptide; Future; Expected date: 02/20/2025    4. Tremor  Overview:  Hands shake, mouth shakes. Eats and feels better. Will take a clonazapam as well. BG is 147, will check cpeptide.         Assessment & Plan    L02.219 Cutaneous abscess of trunk, unspecified  D52.9 Folate deficiency anemia, unspecified  R77.8 Other specified abnormalities of plasma proteins  E78.00 Pure hypercholesterolemia, unspecified  R73.03 Prediabetes    IMPRESSION:  Noted elevated MCV and MCH, indicating potential B12 or folic acid deficiency  Observed improvement in WBC count and AST levels, though AST still slightly elevated  Noted elevated protein levels  Reviewed cholesterol  panel: total cholesterol 205, HDL 64, , triglycerides significantly reduced  Assessed A1C at 5.8  Evaluated TSH, found to be within normal range  Reviewed C. MRSA results  Considered removal of eyelid lesion if it becomes bothersome  Assessed MRSA-related skin lesions, noting overall improvement but some new occurrences    MRSA:  - Educated the patient on MRSA and its implications.  - Instructed the patient to continue using Hibiclens every other day for skin cleansing.  - Advised to apply prescribed ointment to affected areas using clean q-tips for each application.  - Cautioned against using fingers to apply ointment to prevent contamination.  - Continued doxycycline and rifampin prescriptions.  - Instructed the patient to complete full course of both antibiotics.  - Noted new small abscess on breast and ongoing issues with boils, but improvement in larger abscesses.  - Observed improvement in abscess appearance upon exam.  - Assessed ongoing need for treatment due to new abscesses appearing.  - Wash clothes, sheets, and other fabrics frequently in hot water.  - Use hydrogen peroxide as a  for colored clothes.  - Use bleach for white fabrics when possible.  - Consider using non-bleach laundry  for items that cannot be bleached.    EYELID LESION:  - Advised the patient to contact the office if the eyelid lesion becomes bothersome for potential removal.    MACROCYTIC ANEMIA:  - Explained MCV and MCH as indicators of erythrocyte size and their relation to oxygen and nutrient transport.  - Ordered B12 and folic acid level tests if sufficient blood sample is available; otherwise, patient may need to return for additional phlebotomy.  - Noted enlarged erythrocyte size (MCV 97-98, normal 94) indicating possible folate deficiency.  - Evaluated blood test results showing macrocytic anemia.  - Planned to check B12 and folic acid levels.    ELEVATED PROTEIN LEVELS:  - Discussed protein levels in  "relation to diet.  - Noted elevated protein levels.  - Evaluated protein levels from blood test.    HYPERLIPIDEMIA:  - Discussed HDL as "good cholesterol".  - Monitored cholesterol levels, noting improvement.  - Evaluated current cholesterol levels: Total cholesterol 205, HDL 64, .  - Assessed cholesterol ratio as less important than individual values.  - Explained triglycerides as related to carbohydrate intake.    DIABETES MANAGEMENT:  - Monitored A1C level, which is 5.8.              Follow up in about 4 days (around 2/24/2025) for routine follow up, medication change follow up.    This note was generated with the assistance of ambient listening technology. Verbal consent was obtained by the patient and accompanying visitor(s) for the recording of patient appointment to facilitate this note. I attest to having reviewed and edited the generated note for accuracy, though some syntax or spelling errors may persist. Please contact the author of this note for any clarification.                "

## 2025-02-21 LAB — C PEPTIDE P FAST SERPL-MCNC: 18 NG/ML (ref 1.1–4.4)

## 2025-02-25 ENCOUNTER — OFFICE VISIT (OUTPATIENT)
Dept: FAMILY MEDICINE | Facility: CLINIC | Age: 55
End: 2025-02-25
Payer: MEDICAID

## 2025-02-25 VITALS
WEIGHT: 216.06 LBS | TEMPERATURE: 98 F | BODY MASS INDEX: 34.72 KG/M2 | SYSTOLIC BLOOD PRESSURE: 134 MMHG | HEART RATE: 76 BPM | OXYGEN SATURATION: 99 % | RESPIRATION RATE: 16 BRPM | DIASTOLIC BLOOD PRESSURE: 84 MMHG | HEIGHT: 66 IN

## 2025-02-25 DIAGNOSIS — L73.9 FOLLICULITIS: ICD-10-CM

## 2025-02-25 DIAGNOSIS — E11.9 TYPE 2 DIABETES MELLITUS WITHOUT COMPLICATION, WITHOUT LONG-TERM CURRENT USE OF INSULIN: Primary | ICD-10-CM

## 2025-02-25 DIAGNOSIS — F41.9 ANXIETY: ICD-10-CM

## 2025-02-25 PROCEDURE — 99215 OFFICE O/P EST HI 40 MIN: CPT | Mod: PBBFAC | Performed by: NURSE PRACTITIONER

## 2025-02-25 PROCEDURE — 99999 PR PBB SHADOW E&M-EST. PATIENT-LVL V: CPT | Mod: PBBFAC,,, | Performed by: NURSE PRACTITIONER

## 2025-02-25 RX ORDER — METFORMIN HYDROCHLORIDE 500 MG/1
500 TABLET, EXTENDED RELEASE ORAL 2 TIMES DAILY WITH MEALS
Qty: 180 TABLET | Refills: 3 | Status: SHIPPED | OUTPATIENT
Start: 2025-02-25

## 2025-02-25 RX ORDER — CLONAZEPAM 1 MG/1
1 TABLET ORAL 2 TIMES DAILY
Qty: 60 TABLET | Refills: 0 | Status: SHIPPED | OUTPATIENT
Start: 2025-02-25

## 2025-02-25 NOTE — PROGRESS NOTES
"Patient ID: Raquel Byrne is a 55 y.o. female.    Chief Complaint: Results      History of Present Illness    CHIEF COMPLAINT:  Patient presents today for follow up of skin infection    MRSA INFECTION:  She has a history of MRSA infection previously treated with Keflex, which was later changed to a combination of doxycycline and rifampin. Most lesions have healed with significant improvement in fingertips, though a few lesions persist with associated itching.    HYPOGLYCEMIA:  She experiences shaking episodes associated with fatigue. C-peptide was elevated at 18 (normal range 1-4), indicating excessive insulin production. She denies sugar consumption. She was previously prescribed metformin but only took it for 1-2 days before discontinuing.    GASTROINTESTINAL:  She has a history of constipation which has improved with recent medication initiation. She reports having a bowel movement today.      ROS:  General: -fever, -chills, +fatigue, -weight gain, -weight loss  Eyes: -vision changes, -redness, -discharge  ENT: -ear pain, -nasal congestion, -sore throat  Cardiovascular: -chest pain, -palpitations, -lower extremity edema  Respiratory: -cough, -shortness of breath  Gastrointestinal: -abdominal pain, -nausea, -vomiting, -diarrhea, -constipation, -blood in stool  Genitourinary: -dysuria, -hematuria, -frequency  Musculoskeletal: -joint pain, -muscle pain  Skin: -rash, -lesion, +itching  Neurological: -headache, -dizziness, -numbness, -tingling  Psychiatric: -anxiety, -depression, -sleep difficulty       /84   Pulse 76   Temp 98 °F (36.7 °C) (Temporal)   Resp 16   Ht 5' 6" (1.676 m)   Wt 98 kg (216 lb 0.8 oz)   SpO2 99%   BMI 34.87 kg/m²      Physical Exam    Vitals: Weight: 211 lbs.  General: No acute distress. Well-developed. Well-nourished.  Eyes: EOMI. Sclerae anicteric.  HENT: Normocephalic. Atraumatic. Nares patent. Moist oral mucosa.  Ears: Bilateral TMs clear. Bilateral EACs " clear.  Cardiovascular: Regular rate. Regular rhythm. No murmurs. No rubs. No gallops. Normal S1, S2.  Respiratory: Normal respiratory effort. Clear to auscultation bilaterally. No rales. No rhonchi. No wheezing.  Abdomen: Soft. Non-tender. Non-distended. Normoactive bowel sounds.  Musculoskeletal: No  obvious deformity.  Extremities: No lower extremity edema.  Neurological: Alert & oriented x3. No slurred speech. Normal gait.  Psychiatric: Normal mood. Normal affect. Good insight. Good judgment.  Skin: Warm. Dry. No rash. Fingertips appear improved.         1. Type 2 diabetes mellitus without complication, without long-term current use of insulin  Overview:  , did eat, will increase her dose of metformin and ozempic. Advised to check morning glucose.   Lab Results   Component Value Date    HGBA1C 7.8 (H) 07/01/2024    HGBA1C 6.9 03/28/2024    .00 07/01/2024    CREATININE 0.83 07/01/2024      Diabetes Medications               metFORMIN (GLUCOPHAGE) 1000 MG tablet Take 1 tablet (1,000 mg total) by mouth 2 (two) times daily with meals.    OZEMPIC 0.25 mg or 0.5 mg (2 mg/3 mL) pen injector Inject 0.5 mg into the skin every 7 days.          On ACE and Statin according to guidelines.  Discussed caution with SGLT2s + diuretics as concomitant use can cause volume depletion. Discussed caution with DPP-Ivs and HF risk.  Follow ADA Diet. Avoid soda, simple sweets, and limit rice/pasta/breads/starches (no more than 45-50 grams per meal).  Maintain healthy weight with goal BMI <30.  Exercise 5 times per week for 30 minutes per day.  Stressed importance of daily foot exams.  Stressed importance of annual dilated eye exam.          Assessment & Plan:  Elevated cpeptide, please start metformin, will recheck in 1 month.    Orders:  -     Diabetic Eye Screening Photo; Future  -     metFORMIN (GLUCOPHAGE-XR) 500 MG ER 24hr tablet; Take 1 tablet (500 mg total) by mouth 2 (two) times daily with meals.  Dispense: 180  tablet; Refill: 3  -     C-Peptide; Future; Expected date: 03/25/2025    2. Folliculitis  Overview:  Generalized, open with various stages of wound healing. Notes no other family or friend has lesions. Painful and burning.  Culture obtained.    Assessment & Plan:  Much improved. Almost healed, please finish medication, continue application in nares.      3. Anxiety  Overview:  Anxiety- citalopram dose increased to 20mg.    Assessment & Plan:  Notes improved with citalopram.     Orders:  -     clonazePAM (KLONOPIN) 1 MG tablet; Take 1 tablet (1 mg total) by mouth 2 (two) times daily. as needed for anxiety.  Dispense: 60 tablet; Refill: 0       Assessment & Plan    E16.3 Increased secretion of glucagon  E88.819 Insulin resistance, unspecified  K59.09 Other constipation  Z86.19 Personal history of other infectious and parasitic diseases    IMPRESSION:  Patient has severe insulin resistance with C-peptide of 18 (normal range up to 4.4), indicating excessive insulin production without adequate utilization  Liver overproducing glucose in response to perceived cellular glucose deficiency  Recommend metformin to address insulin resistance and regulate hepatic glucose production  If metformin ineffective after 2 weeks, further testing may be required  Previous folliculitis infection positive for MRSA, treated with doxycycline and rifampin combination  Most skin lesions have healed, with some residual itching noted    INSULIN RESISTANCE:  - Evaluated the patient's C-peptide level, which is 18, significantly higher than the normal range of 1-4, indicating increased insulin production.  - Explained to the patient their condition of insulin resistance and overproduction.  - Recommend metformin to regulate insulin resistance and hepatic glucose production.  - Explained the insulin resistance mechanism using a cell wall analogy to the patient.  - Clarified metformin's role in improving insulin sensitivity and regulating hepatic  glucose production.  - Initiated metformin extended release with instructions to titrate slowly to avoid GI side effects.  - Noted that the patient experiences shaking and fatigue due to insulin resistance.    CONSTIPATION:  - Noted that the patient reports having constipation.  - Prescribed a new medication for constipation.  - Observed improvement in bowel movements with the new medication.    MRSA INFECTION:  - Noted the patient's history of MRSA infection.  - Observed improvement in the patient's skin lesions.  - Confirmed that the patient was previously treated with doxycycline and rifampin for MRSA.    FOLLOW UP:  - Scheduled a follow-up visit in 2 weeks to assess metformin effectiveness and determine if further testing is needed.              Follow up for Labs done prior to appointment.    This note was generated with the assistance of ambient listening technology. Verbal consent was obtained by the patient and accompanying visitor(s) for the recording of patient appointment to facilitate this note. I attest to having reviewed and edited the generated note for accuracy, though some syntax or spelling errors may persist. Please contact the author of this note for any clarification.

## 2025-02-27 ENCOUNTER — OFFICE VISIT (OUTPATIENT)
Dept: FAMILY MEDICINE | Facility: CLINIC | Age: 55
End: 2025-02-27
Payer: MEDICAID

## 2025-02-27 ENCOUNTER — E-CONSULT (OUTPATIENT)
Dept: ENDOCRINOLOGY | Facility: CLINIC | Age: 55
End: 2025-02-27
Payer: MEDICAID

## 2025-02-27 VITALS
OXYGEN SATURATION: 99 % | DIASTOLIC BLOOD PRESSURE: 91 MMHG | RESPIRATION RATE: 18 BRPM | BODY MASS INDEX: 34.72 KG/M2 | HEIGHT: 66 IN | HEART RATE: 88 BPM | TEMPERATURE: 97 F | SYSTOLIC BLOOD PRESSURE: 155 MMHG | WEIGHT: 216.06 LBS

## 2025-02-27 DIAGNOSIS — R79.89 HIGH SERUM C-PEPTIDE: ICD-10-CM

## 2025-02-27 DIAGNOSIS — E11.9 TYPE 2 DIABETES MELLITUS WITHOUT COMPLICATION, WITHOUT LONG-TERM CURRENT USE OF INSULIN: Primary | ICD-10-CM

## 2025-02-27 DIAGNOSIS — D13.7 INSULINOMA: ICD-10-CM

## 2025-02-27 DIAGNOSIS — E16.2 HYPOGLYCEMIA: Primary | ICD-10-CM

## 2025-02-27 DIAGNOSIS — E16.2 HYPOGLYCEMIA: ICD-10-CM

## 2025-02-27 DIAGNOSIS — R11.2 NAUSEA AND VOMITING, UNSPECIFIED VOMITING TYPE: ICD-10-CM

## 2025-02-27 PROCEDURE — 99214 OFFICE O/P EST MOD 30 MIN: CPT | Mod: PBBFAC | Performed by: NURSE PRACTITIONER

## 2025-02-27 PROCEDURE — 99999 PR PBB SHADOW E&M-EST. PATIENT-LVL IV: CPT | Mod: PBBFAC,,, | Performed by: NURSE PRACTITIONER

## 2025-02-27 RX ORDER — ONDANSETRON 8 MG/1
8 TABLET, ORALLY DISINTEGRATING ORAL EVERY 6 HOURS PRN
Qty: 45 TABLET | Refills: 0 | Status: SHIPPED | OUTPATIENT
Start: 2025-02-27

## 2025-02-27 NOTE — PROGRESS NOTES
"Patient ID: Raquel Byrne is a 55 y.o. female.    Chief Complaint: Hypoglycemia (Nocturnal hypoglycemia readings on Dexcom )      History of Present Illness    CHIEF COMPLAINT:  Patient presents today for blood sugar fluctuations with associated shaking.    BLOOD SUGAR MANAGEMENT:  She reports significant blood sugar fluctuations, including a high of 264 at midnight followed by a drop to 67 at 3am. She had a recent blood sugar reading of 257 that remained steady. After consuming a cheeseburger, her blood sugar temporarily stabilized to 140s. She reports eating a Pop Tart this morning.    CURRENT SYMPTOMS:  She experiences severe shaking episodes with significant weakness, particularly during blood sugar lows. She reports unilateral head pain and sharp intermittent abdominal pains. She also experiences motion sickness with vomiting while traveling by car. She reports excessive daytime sleepiness affecting her ability to drive safely.    GI CONCERNS:  She reports ongoing constipation.    LABORATORY RESULTS:  C-peptide was markedly elevated at 18 (normal range 4).      ROS:  General: -fever, -chills, -fatigue, -weight gain, -weight loss  Eyes: -vision changes, -redness, -discharge  ENT: -ear pain, -nasal congestion, -sore throat  Cardiovascular: -chest pain, -palpitations, -lower extremity edema  Respiratory: -cough, -shortness of breath  Gastrointestinal: +abdominal pain, -nausea, +vomiting, -diarrhea, +constipation, -blood in stool  Genitourinary: -dysuria, -hematuria, -frequency  Musculoskeletal: -joint pain, -muscle pain  Skin: -rash, -lesion  Neurological: -headache, -dizziness, -numbness, -tingling, +tremors, +weakness  Psychiatric: -anxiety, -depression, +sleep difficulty       BP (!) 155/91   Pulse 88   Temp 97 °F (36.1 °C) (Temporal)   Resp 18   Ht 5' 6" (1.676 m)   Wt 98 kg (216 lb 0.8 oz)   SpO2 99%   BMI 34.87 kg/m²      Physical Exam    General: No acute distress. Well-developed. " "Well-nourished.  Eyes: EOMI. Sclerae anicteric.  HENT: Normocephalic. Atraumatic. Nares patent. Moist oral mucosa.  Ears: Bilateral TMs clear. Bilateral EACs clear.  Cardiovascular: Regular rate. Regular rhythm. No murmurs. No rubs. No gallops. Normal S1, S2.  Respiratory: Normal respiratory effort. Clear to auscultation bilaterally. No rales. No rhonchi. No wheezing.  Abdomen: Soft. Non-tender. Non-distended. Normoactive bowel sounds.  Musculoskeletal: No  obvious deformity.  Extremities: No lower extremity edema.  Neurological: Alert & oriented x3. No slurred speech. Normal gait.  Psychiatric: Normal mood. Normal affect. Good insight. Good judgment.  Skin: Warm. Dry. No rash.         1. Hypoglycemia  Overview:  Notes 's then will got to the 50's, she has a libre3 to the right arm for monitoring.   When she starts shaking, her tristan shows low, will check with finger stick and is in fact low, was 57 at one time. Patient notes "My numbers move so fast". Patient notes that she was fasting with the cpeptide, however, will repeat. Advised to check with fingerstick if low or high.  Will do a CT of the pancreas.    Orders:  -     E-Consult to Endocrinology  -     CT ABDOMEN PELVIS WITH CONTRAST (PANCREAS PROTOCOL); Future; Expected date: 02/27/2025    2. High serum c-peptide  Overview:  Libre3 to the right arm.  Called multiple endocrinology offices for appointment, no appointment available until May at Dr. Youngblood.  Econsult completed.  Patient informed.    Orders:  -     US Abdomen Limited; Future; Expected date: 02/27/2025  -     E-Consult to Endocrinology    3. Nausea and vomiting, unspecified vomiting type  Assessment & Plan:  Having n/v, zofran ordered.     Orders:  -     ondansetron (ZOFRAN-ODT) 8 MG TbDL; Take 1 tablet (8 mg total) by mouth every 6 (six) hours as needed.  Dispense: 45 tablet; Refill: 0  -     CT ABDOMEN PELVIS WITH CONTRAST (PANCREAS PROTOCOL); Future; Expected date: 02/27/2025    4. " Insulinoma  Overview:  Elevated cpeptide, 18, FBG .    Orders:  -     Insulin Antibody; Future; Expected date: 02/27/2025  -     GAD65 Ab, Serum; Future; Expected date: 02/27/2025  -     Anti-islet cell antibody; Future; Expected date: 02/27/2025  -     C-Peptide; Future; Expected date: 02/27/2025  -     E-Consult to Endocrinology       Assessment & Plan    E11.649 Type 2 diabetes mellitus with hypoglycemia without coma  R51.0 Headache with orthostatic component, not elsewhere classified  R10.84 Generalized abdominal pain  T75.3XXD Motion sickness, subsequent encounter  G47.14 Hypersomnia due to medical condition  K59.00 Constipation, unspecified  R73.01 Impaired fasting glucose  Z79.84 Long term (current) use of oral hypoglycemic drugs    IMPRESSION:  Concerned about elevated C-peptide level (18, normal range ~4)  Suspect overproduction of insulin by pancreas  Patient experiencing blood sugar fluctuations (highs of 250-264, lows in 50-67 range)  Considering possible pancreatic dysfunction due to elevated insulin levels and sugar fluctuations  Ordered abdominal ultrasound to assess pancreas size and structure    DIABETES:  - Explained the role of the pancreas in insulin production and the liver in glucose production.  - Discussed the mechanism of insulin and cellular glucose uptake using visual aids.  - Clarified the difference between Type 1 and Type 2 diabetes in relation to insulin production.  - Continued metformin for diabetes management.  - Noted patient's glucose fluctuations, with highest reading of 264 at midnight and lowest of 67 at 3 a.m.  - Observed that the patient experiences tremors and weakness during hypoglycemic episodes, and some tremors during hyperglycemic episodes.  - Assessed that the patient's C-peptide level is significantly elevated at 18, indicating overproduction of insulin.  - Ordered an abdominal ultrasound to investigate the cause of insulin overproduction by the pancreas.  -  Advised the patient to consume lean protein such as chicken and to be cautious with fatty protein intake.  - Noted that the patient's glucose level was 257 and not changing.  - Patient to eat lean protein like baked or rotisserie chicken instead of fried chicken.  - Recommend limiting fatty protein intake.    ABDOMINAL PAIN:  - Noted that the patient reports experiencing sharp pains in the abdominal area.  - Ordered an abdominal ultrasound with focus on the pancreas.    NAUSEA AND VOMITING:  - Prescribed Zofran 8 mg, to be taken once in the morning and once in the evening, not to exceed twice daily.  - Noted that the patient experienced nausea and vomiting while riding in a car.    HEADACHE:  - Noted that the patient reports unilateral headache.    EXCESSIVE SLEEPINESS:  - Noted that the patient reports excessive sleepiness, especially while driving.    CONSTIPATION:  - Noted that the patient reports ongoing constipation.  - Planned to prescribe Ibsrela for constipation management.    FOLLOW UP:  - Instructed the patient to contact the office when ultrasound results are available.              No follow-ups on file.    This note was generated with the assistance of ambient listening technology. Verbal consent was obtained by the patient and accompanying visitor(s) for the recording of patient appointment to facilitate this note. I attest to having reviewed and edited the generated note for accuracy, though some syntax or spelling errors may persist. Please contact the author of this note for any clarification.

## 2025-02-27 NOTE — CONSULTS
[facility]  Response for E-Consult     Patient Name: Raquel Byrne  MRN: [id number]  Primary Care Provider: Renee Love FNP   Requesting Provider: Renee Love FNP  E-Consult to Endocrinology  Consult performed by: Drew Morris DO  Consult ordered by: Renee Love FNP  Reason for consult: Diabetes, hypoglycemia  Assessment/Recommendations: See note      I have reviewed the chart and labs for your 55-year-old female patient, noting the elevated C-peptide (18) and concerns for hypoglycemia. Given her T2DM diagnosis, further evaluation should focus on confirming true low glucose levels and assessing clinical symptoms.    Key Considerations:  The prior elevated C-peptide is not particularly helpful, as there was no paired glucose or insulin to determine if insulin was inappropriately high or appropriately low.  Symptoms are crucial--its important to clarify whether she is waking due to symptoms of hypoglycemia or simply from CGM alerts. If glucose is truly low, symptoms are more likely to wake her up rather than just the device alarm.  CGM readings in the 50s should be confirmed with fingerstick glucose, as CGMs can be inaccurate, particularly due to compression artifacts (e.g., laying on the sensor) or at very high glucose levels.  If true hypoglycemia is suspected, its essential to determine whether it occurs fasting or postprandial, as this guides further workup.  For fasting hypoglycemia, a fasting blood draw with paired glucose, random insulin, and C-peptide is recommended.  Pancreas ultrasound is not necessary unless insulin-mediated hypoglycemia is confirmed; in that case, CT or MRI would be the preferred imaging modality.  BAKARI and islet cell antibody testing may be considered later but only if serum studies suggest an autoimmune process.    At this point, confirming true low glucose with serum studies and assessing symptoms will be most informative for further evaluation. Let me know if  questions.    Total time of Consultation: 10 minute    I did not speak to the requesting provider verbally about this.     *This eConsult is based on the clinical data available to me and is furnished without benefit of a physical examination. The eConsult will need to be interpreted in light of any clinical issues or changes in patient status not available to me at the time of filing this eConsults. Significant changes in patient condition or level of acuity should result in immediate formal consultation and reevaluation. Please alert me if you have further questions.    Thank you for this eConsult referral.     DO Akbar Monae raul - [facility]

## 2025-02-28 ENCOUNTER — DOCUMENTATION ONLY (OUTPATIENT)
Dept: FAMILY MEDICINE | Facility: CLINIC | Age: 55
End: 2025-02-28
Payer: MEDICAID

## 2025-02-28 ENCOUNTER — LAB VISIT (OUTPATIENT)
Dept: LAB | Facility: HOSPITAL | Age: 55
End: 2025-02-28
Attending: NURSE PRACTITIONER
Payer: MEDICAID

## 2025-02-28 DIAGNOSIS — G56.92: Primary | ICD-10-CM

## 2025-02-28 DIAGNOSIS — D13.7 INSULINOMA: ICD-10-CM

## 2025-02-28 DIAGNOSIS — E11.9 TYPE 2 DIABETES MELLITUS WITHOUT COMPLICATION, WITHOUT LONG-TERM CURRENT USE OF INSULIN: ICD-10-CM

## 2025-02-28 DIAGNOSIS — R25.1 TREMOR: ICD-10-CM

## 2025-02-28 DIAGNOSIS — G43.E09 CHRONIC MIGRAINE WITH AURA WITHOUT STATUS MIGRAINOSUS, NOT INTRACTABLE: ICD-10-CM

## 2025-02-28 LAB
EST. AVERAGE GLUCOSE BLD GHB EST-MCNC: 119.8 MG/DL
HBA1C MFR BLD: 5.8 %

## 2025-02-28 PROCEDURE — 86337 INSULIN ANTIBODIES: CPT

## 2025-02-28 PROCEDURE — 36415 COLL VENOUS BLD VENIPUNCTURE: CPT

## 2025-02-28 PROCEDURE — 84681 ASSAY OF C-PEPTIDE: CPT

## 2025-02-28 PROCEDURE — 86341 ISLET CELL ANTIBODY: CPT

## 2025-02-28 PROCEDURE — 83036 HEMOGLOBIN GLYCOSYLATED A1C: CPT

## 2025-02-28 NOTE — PROGRESS NOTES
Patient was call and informed about her resulted labs.  Advised that if the metformin makes her feel worse to stop it.

## 2025-03-01 LAB — C PEPTIDE P FAST SERPL-MCNC: 6.6 NG/ML (ref 1.1–4.4)

## 2025-03-05 ENCOUNTER — HOSPITAL ENCOUNTER (OUTPATIENT)
Dept: RADIOLOGY | Facility: HOSPITAL | Age: 55
Discharge: HOME | End: 2025-03-05
Attending: NURSE PRACTITIONER
Payer: MEDICAID

## 2025-03-05 DIAGNOSIS — E16.2 HYPOGLYCEMIA: ICD-10-CM

## 2025-03-05 DIAGNOSIS — R11.2 NAUSEA AND VOMITING, UNSPECIFIED VOMITING TYPE: ICD-10-CM

## 2025-03-05 LAB — ISLET CELL512 AB SER-SCNC: 0 NMOL/L

## 2025-03-05 PROCEDURE — 74177 CT ABD & PELVIS W/CONTRAST: CPT | Mod: TC

## 2025-03-05 PROCEDURE — 25500020 PHARM REV CODE 255: Performed by: NURSE PRACTITIONER

## 2025-03-05 RX ORDER — IOPAMIDOL 755 MG/ML
100 INJECTION, SOLUTION INTRAVASCULAR
Status: COMPLETED | OUTPATIENT
Start: 2025-03-05 | End: 2025-03-05

## 2025-03-05 RX ADMIN — IOPAMIDOL 100 ML: 755 INJECTION, SOLUTION INTRAVENOUS at 02:03

## 2025-03-06 ENCOUNTER — OFFICE VISIT (OUTPATIENT)
Dept: FAMILY MEDICINE | Facility: CLINIC | Age: 55
End: 2025-03-06
Payer: MEDICAID

## 2025-03-06 VITALS
DIASTOLIC BLOOD PRESSURE: 84 MMHG | TEMPERATURE: 97 F | OXYGEN SATURATION: 99 % | HEART RATE: 87 BPM | WEIGHT: 216.06 LBS | BODY MASS INDEX: 34.72 KG/M2 | HEIGHT: 66 IN | RESPIRATION RATE: 16 BRPM | SYSTOLIC BLOOD PRESSURE: 137 MMHG

## 2025-03-06 DIAGNOSIS — E11.9 TYPE 2 DIABETES MELLITUS WITHOUT COMPLICATION, WITHOUT LONG-TERM CURRENT USE OF INSULIN: Primary | ICD-10-CM

## 2025-03-06 DIAGNOSIS — R11.2 NAUSEA AND VOMITING, UNSPECIFIED VOMITING TYPE: ICD-10-CM

## 2025-03-06 DIAGNOSIS — K58.1 IRRITABLE BOWEL SYNDROME WITH CONSTIPATION: ICD-10-CM

## 2025-03-06 DIAGNOSIS — E16.2 HYPOGLYCEMIA: ICD-10-CM

## 2025-03-06 PROCEDURE — 99215 OFFICE O/P EST HI 40 MIN: CPT | Mod: PBBFAC | Performed by: NURSE PRACTITIONER

## 2025-03-06 PROCEDURE — 99999PBSHW PR PBB SHADOW TECHNICAL ONLY FILED TO HB: Mod: PBBFAC,,,

## 2025-03-06 PROCEDURE — S0119 ONDANSETRON 4 MG: HCPCS | Mod: PBBFAC | Performed by: NURSE PRACTITIONER

## 2025-03-06 PROCEDURE — 99999 PR PBB SHADOW E&M-EST. PATIENT-LVL V: CPT | Mod: PBBFAC,,, | Performed by: NURSE PRACTITIONER

## 2025-03-06 RX ORDER — ONDANSETRON 4 MG/1
4 TABLET, ORALLY DISINTEGRATING ORAL ONCE
Status: COMPLETED | OUTPATIENT
Start: 2025-03-06 | End: 2025-03-06

## 2025-03-06 RX ORDER — TENAPANOR HYDROCHLORIDE 53.2 MG/1
50 TABLET ORAL 2 TIMES DAILY
Qty: 60 TABLET | Refills: 1 | Status: SHIPPED | OUTPATIENT
Start: 2025-03-06

## 2025-03-06 RX ADMIN — ONDANSETRON 4 MG: 4 TABLET, ORALLY DISINTEGRATING ORAL at 08:03

## 2025-03-06 NOTE — PROGRESS NOTES
Patient ID: Raquel Byrne is a 55 y.o. female.    Chief Complaint: Diabetes and Follow-up      History of Present Illness    CHIEF COMPLAINT:  Patient presents today for follow up of blood sugar control.    DIABETES MANAGEMENT:  She reports blood sugar fluctuations ranging from 48 to 260, occurring without clear relationship to food intake. She describes a consistent pattern of nighttime blood sugar changes, with elevations around midnight followed by drops around 3 AM. A1C was 5.8, indicating pre-diabetes, improved from a previous value of 7.8 that had improved to 6.1 in the past.    DIET:  Her recent dinner included gumbo with rice and a small amount of potato salad. She acknowledges poor dietary choices during periods of depression, primarily consuming candy and chips.    GI CONCERNS:  She reports chronic constipation with most recent bowel movement yesterday characterized by fragmented, hard stools. She has a strong family history of constipation, with biological sister requiring two surgeries for constipation-related issues and grandfather experiencing severe constipation.    CURRENT MEDICATIONS:  She is currently taking Metformin, Klonopin for anxiety management (restarted 1-2 years ago after previous discontinuation), and Celexa.    IMAGING:  CT showed mild hepatomegaly, grade one anterior listhesis of the spine, and a calcified pulmonary nodule in the lung bases. Pancreas was within normal limits.      ROS:  General: -fever, -chills, -fatigue, -weight gain, -weight loss  Eyes: -vision changes, -redness, -discharge  ENT: -ear pain, -nasal congestion, -sore throat  Cardiovascular: -chest pain, -palpitations, -lower extremity edema  Respiratory: -cough, -shortness of breath  Gastrointestinal: -abdominal pain, -nausea, -vomiting, -diarrhea, +constipation, -blood in stool  Genitourinary: -dysuria, -hematuria, -frequency  Musculoskeletal: -joint pain, -muscle pain  Skin: -rash, -lesion  Neurological: -headache,  "-dizziness, -numbness, -tingling  Psychiatric: -anxiety, +depression, -sleep difficulty       /84   Pulse 87   Temp 97 °F (36.1 °C) (Temporal)   Resp 16   Ht 5' 6" (1.676 m)   Wt 98 kg (216 lb 0.8 oz)   SpO2 99%   BMI 34.87 kg/m²      Physical Exam    General: No acute distress. Well-developed. Well-nourished.  Eyes: EOMI. Sclerae anicteric.  HENT: Normocephalic. Atraumatic. Nares patent. Moist oral mucosa.  Ears: Bilateral TMs clear. Bilateral EACs clear.  Cardiovascular: Regular rate. Regular rhythm. No murmurs. No rubs. No gallops. Normal S1, S2.  Respiratory: Normal respiratory effort. Clear to auscultation bilaterally. No rales. No rhonchi. No wheezing.  Abdomen: Soft. Non-tender. Non-distended. Normoactive bowel sounds.  Musculoskeletal: No  obvious deformity.  Extremities: No lower extremity edema.  Neurological: Alert & oriented x3. No slurred speech. Normal gait.  Psychiatric: Normal mood. Normal affect. Good insight. Good judgment.  Skin: Warm. Dry. No rash.         1. Type 2 diabetes mellitus without complication, without long-term current use of insulin  Overview:  , did eat, will increase her dose of metformin and ozempic. Advised to check morning glucose.   Lab Results   Component Value Date    HGBA1C 7.8 (H) 07/01/2024    HGBA1C 6.9 03/28/2024    .00 07/01/2024    CREATININE 0.83 07/01/2024      Diabetes Medications               metFORMIN (GLUCOPHAGE) 1000 MG tablet Take 1 tablet (1,000 mg total) by mouth 2 (two) times daily with meals.    OZEMPIC 0.25 mg or 0.5 mg (2 mg/3 mL) pen injector Inject 0.5 mg into the skin every 7 days.          On ACE and Statin according to guidelines.  Discussed caution with SGLT2s + diuretics as concomitant use can cause volume depletion. Discussed caution with DPP-Ivs and HF risk.  Follow ADA Diet. Avoid soda, simple sweets, and limit rice/pasta/breads/starches (no more than 45-50 grams per meal).  Maintain healthy weight with goal BMI <30.  " Exercise 5 times per week for 30 minutes per day.  Stressed importance of daily foot exams.  Stressed importance of annual dilated eye exam.          Assessment & Plan:  Patient is continuing to have -70's, advise she continue with the metformin and will start jardiance, advise keep carb count to 20-30 per meal. RTC in 1 week, sooner if BG is worsened.    Orders:  -     empagliflozin (JARDIANCE) 10 mg tablet; Take 1 tablet (10 mg total) by mouth once daily.  Dispense: 30 tablet; Refill: 1    2. Irritable bowel syndrome with constipation  Overview:  Patient was taking linzess and noted that was not emptying out all the way. Will order ibsrela 50mg     Orders:  -     tenapanor (IBSRELA) 50 mg Tab; Take 50 mg by mouth 2 (two) times daily.  Dispense: 60 tablet; Refill: 1       Assessment & Plan    R73.03 Prediabetes  R73.9 Hyperglycemia, unspecified  F32.9 Major depressive disorder, single episode, unspecified  F41.9 Anxiety disorder, unspecified  K59.00 Constipation, unspecified  K76.89 Other specified diseases of liver  M43.15 Spondylolisthesis, thoracolumbar region  R91.1 Solitary pulmonary nodule  R11.2 Nausea with vomiting, unspecified  Z84.89 Family history of other specified conditions    IMPRESSION:  Patient has pre-diabetes with A1C of 5.8  Experiencing blood sugar fluctuations, including jennifer effect  Considering switch from Metformin to Jardiance for better blood sugar control  Ordered insulin antibody and BAKARI 65 tests to evaluate pancreatic function  Constipation issues persist; current regimen may not be fully effective  Anxiety symptoms present; avoiding benzodiazepines due to safety concerns    DIABETES:  - Explained the concept of diabetes remission versus cure to the patient.  - Started Jardiance at a lower dose for blood sugar control.  - Continued Metformin 1 tablet daily.  - Ordered insulin antibody and BAKARI 65 tests.  - Scheduled follow up in 1 month to reassess medication efficacy.  -  Instructed the patient to contact the office to report response to Jardiance.  - Noted patient's A1c is 5.8, indicating prediabetes.  - Observed blood sugar fluctuations between high and low levels.  - Measured current blood glucose level at 231 mg/dL, trending steady.  - Explained the relationship between blood sugar fluctuations and A1c levels.  - Advised on dietary changes to manage blood sugar.  - Noted patient's blood sugar fluctuations, with high readings of 260 mg/dL and above.  - Measured current blood glucose level at 231 mg/dL, trending steady.  - Assessed the patient's diet and its impact on blood sugar.  - Prescribed Jardiance in addition to Metformin.  - Provided dietary advice to manage blood sugar.  - Explained relationship between carbohydrate intake and blood sugar, including different types of carbohydrates and their effects.  - Discussed protein's role in stabilizing blood sugar.  - Educated on appropriate food choices for blood sugar management.  - Patient to eat a small snack (e.g., half an apple with peanut butter) about 1 hour before bedtime.  - Recommend increasing protein intake to help stabilize blood sugar.  - Patient to be mindful of carbohydrate intake, especially rice and potato salad.  - Recommend focusing on consuming non-starchy vegetables and complex carbohydrates.    DEPRESSION:  - Noted patient's mention of past depressive episode with unhealthy eating habits.  - Continued Citalopram (Celexa) for depression management.    ANXIETY:  - Continued Citalopram (Celexa) for anxiety management.  - Noted patient reports of shaking and nervousness.  - Discussed the dangers of benzodiazepines for anxiety treatment.  - Acknowledged patient is currently taking Klonopin for anxiety.  - Suggested alternative treatments for anxiety.    CONSTIPATION:  - Continued current laxative regimen.  - Noted patient's report of recent bowel movement after period of constipation.  - Reviewed CT showing feces  scattered throughout the colon.  - Acknowledged family history of constipation and related complications.  - Discussed alternative treatments for constipation.    HEPATOMEGALY:  - Noted patient's known enlarged liver (hepatomegaly).    SPINAL LISTHESIS:  - Reviewed CT showing grade 1 anterior listhesis in the spine.    PULMONARY NODULE:  - Reviewed CT revealing a calcified pulmonary nodule in the lung bases. Not new.    NAUSEA:  - Administered Zofran in office for nausea.  - Noted patient's report of feeling nauseous.              Follow up in about 1 week (around 3/13/2025).    This note was generated with the assistance of ambient listening technology. Verbal consent was obtained by the patient and accompanying visitor(s) for the recording of patient appointment to facilitate this note. I attest to having reviewed and edited the generated note for accuracy, though some syntax or spelling errors may persist. Please contact the author of this note for any clarification.

## 2025-03-06 NOTE — ASSESSMENT & PLAN NOTE
Patient is continuing to have -70's, advise she continue with the metformin and will start jardiance, advise keep carb count to 20-30 per meal. RTC in 1 week, sooner if BG is worsened.

## 2025-03-06 NOTE — ASSESSMENT & PLAN NOTE
Lab Results   Component Value Date    HGBA1C 5.8 02/28/2025    HGBA1C 5.8 02/19/2025    .00 02/19/2025    CREATININE 0.75 02/19/2025      Diabetes Medications              metFORMIN (GLUCOPHAGE-XR) 500 MG ER 24hr tablet Take 1 tablet (500 mg total) by mouth 2 (two) times daily with meals.    empagliflozin (JARDIANCE) 10 mg tablet Take 1 tablet (10 mg total) by mouth once daily.          On ACE and Statin according to guidelines.  Discussed caution with SGLT2s + diuretics as concomitant use can cause volume depletion. Discussed caution with DPP-Ivs and HF risk.  Follow ADA Diet. Avoid soda, simple sweets, and limit rice/pasta/breads/starches (no more than 45-50 grams per meal).  Maintain healthy weight with goal BMI <30.  Exercise 5 times per week for 30 minutes per day.  Stressed importance of daily foot exams.  Stressed importance of annual dilated eye exam.

## 2025-03-07 LAB — INSULIN AB SER-SCNC: 0 NMOL/L (ref 0–0.02)

## 2025-03-08 LAB — GAD65 AB SER-SCNC: 0 NMOL/L

## 2025-03-31 ENCOUNTER — OFFICE VISIT (OUTPATIENT)
Dept: FAMILY MEDICINE | Facility: CLINIC | Age: 55
End: 2025-03-31
Payer: MEDICAID

## 2025-03-31 VITALS
WEIGHT: 216.06 LBS | SYSTOLIC BLOOD PRESSURE: 143 MMHG | HEIGHT: 66 IN | OXYGEN SATURATION: 95 % | TEMPERATURE: 98 F | HEART RATE: 84 BPM | RESPIRATION RATE: 16 BRPM | BODY MASS INDEX: 34.72 KG/M2 | DIASTOLIC BLOOD PRESSURE: 88 MMHG

## 2025-03-31 DIAGNOSIS — Z00.00 WELL ADULT EXAM: Primary | ICD-10-CM

## 2025-03-31 DIAGNOSIS — E11.9 TYPE 2 DIABETES MELLITUS WITHOUT COMPLICATION, WITHOUT LONG-TERM CURRENT USE OF INSULIN: ICD-10-CM

## 2025-03-31 DIAGNOSIS — I10 HYPERTENSION, UNSPECIFIED TYPE: ICD-10-CM

## 2025-03-31 DIAGNOSIS — F41.9 ANXIETY: ICD-10-CM

## 2025-03-31 DIAGNOSIS — R21 RASH: ICD-10-CM

## 2025-03-31 PROCEDURE — 3008F BODY MASS INDEX DOCD: CPT | Mod: CPTII,,, | Performed by: NURSE PRACTITIONER

## 2025-03-31 PROCEDURE — 3044F HG A1C LEVEL LT 7.0%: CPT | Mod: CPTII,,, | Performed by: NURSE PRACTITIONER

## 2025-03-31 PROCEDURE — 3077F SYST BP >= 140 MM HG: CPT | Mod: CPTII,,, | Performed by: NURSE PRACTITIONER

## 2025-03-31 PROCEDURE — 99214 OFFICE O/P EST MOD 30 MIN: CPT | Mod: PBBFAC | Performed by: NURSE PRACTITIONER

## 2025-03-31 PROCEDURE — 99396 PREV VISIT EST AGE 40-64: CPT | Mod: S$PBB,,, | Performed by: NURSE PRACTITIONER

## 2025-03-31 PROCEDURE — 3079F DIAST BP 80-89 MM HG: CPT | Mod: CPTII,,, | Performed by: NURSE PRACTITIONER

## 2025-03-31 PROCEDURE — 1159F MED LIST DOCD IN RCRD: CPT | Mod: CPTII,,, | Performed by: NURSE PRACTITIONER

## 2025-03-31 PROCEDURE — 1160F RVW MEDS BY RX/DR IN RCRD: CPT | Mod: CPTII,,, | Performed by: NURSE PRACTITIONER

## 2025-03-31 PROCEDURE — 99999 PR PBB SHADOW E&M-EST. PATIENT-LVL IV: CPT | Mod: PBBFAC,,, | Performed by: NURSE PRACTITIONER

## 2025-03-31 PROCEDURE — 99213 OFFICE O/P EST LOW 20 MIN: CPT | Mod: 25,S$PBB,, | Performed by: NURSE PRACTITIONER

## 2025-03-31 RX ORDER — TIRZEPATIDE 2.5 MG/.5ML
2.5 INJECTION, SOLUTION SUBCUTANEOUS
Qty: 2 ML | Refills: 1 | Status: SHIPPED | OUTPATIENT
Start: 2025-03-31

## 2025-03-31 RX ORDER — CITALOPRAM 20 MG/1
20 TABLET, FILM COATED ORAL DAILY
Qty: 30 TABLET | Refills: 11 | Status: SHIPPED | OUTPATIENT
Start: 2025-03-31 | End: 2026-03-31

## 2025-03-31 RX ORDER — CITALOPRAM 10 MG/1
10 TABLET ORAL DAILY
Qty: 30 TABLET | Refills: 11 | Status: SHIPPED | OUTPATIENT
Start: 2025-03-31 | End: 2026-03-31

## 2025-03-31 NOTE — ASSESSMENT & PLAN NOTE
Ozempic causes bloating, constipation, nausea, we will change to toes appetite.  We will see patient back in 2 weeks.  Advised patient to check blood sugars daily.  Discussed side effects of GLP 1.

## 2025-03-31 NOTE — PROGRESS NOTES
Internal Medicine      Patient ID: [id number]     Chief Complaint: Medicare Annual Wellness     HPI:     Raquel Bryne is here today for a Medicare Annual Wellness visit and comprehensive Health Risk Assessment.     A separate E/M code has been provided to evaluate additional complaints that the patient would like addressed during the dedicated Medicare Wellness Exam.    Health Maintenance         Date Due Completion Date    Diabetic Eye Exam 12/15/2024 12/15/2023    Foot Exam 05/30/2025 5/30/2024    COVID-19 Vaccine (1 - 2024-25 season) 02/20/2026 (Originally 9/1/2024) ---    Diabetes Urine Screening 07/01/2025 7/1/2024    Mammogram 08/15/2025 8/15/2024    Hemoglobin A1c 08/28/2025 2/28/2025    Lipid Panel 02/19/2026 2/19/2025    Low Dose Statin 03/06/2026 3/6/2025    Cervical Cancer Screening 07/29/2029 7/29/2024    Colorectal Cancer Screening 12/08/2033 12/8/2023    TETANUS VACCINE 07/31/2034 7/31/2024    RSV Vaccine (Age 60+ and Pregnant patients) (1 - 1-dose 75+ series) 02/22/2046 ---             Past Medical History:   Diagnosis Date    Abdominal pain 07/26/2023    Abnormal vaginal bleeding     Anxiety     Candidiasis of breast 07/26/2023    Cervical cancer screening 07/26/2023    Constipation     Hypertension     Internal hemorrhoids without complication 11/01/2024    Medial meniscus tear     right knee    Muscle spasm 05/09/2024    Having muscle spasms.  Started 2 to 3 weeks ago she says.  I will run a CBC and a magnesium level.      Nausea and vomiting 07/26/2023    Ovarian cyst     Screening for colon cancer 04/10/2023    Positive ASCUS HPV PAP 8/23/23, PAP today,neg HPV, continues to spot during her cycle, referred to GYN.  No cervical discharge, lesions or tenderness. No vaginal discharge or lesions. No labial lesions, erythema. (Chaperone present)       Torn ACL     right knee        Past Surgical History:   Procedure Laterality Date    ANTERIOR CRUCIATE LIGAMENT REPAIR      CHOLECYSTECTOMY       HYSTEROSCOPY WITH DILATION AND CURETTAGE OF UTERUS N/A 2024    Procedure: HYSTEROSCOPY, WITH DILATION AND CURETTAGE OF UTERUS;  Surgeon: Jose Luis Ford MD;  Location: [facility] OR;  Service: OB/GYN;  Laterality: N/A;    INCISION AND DRAINAGE Left     leg left    LAPAROSCOPIC CHOLECYSTECTOMY WITH CHOLANGIOGRAPHY N/A 2023    Procedure: CHOLECYSTECTOMY, LAPAROSCOPIC, WITH CHOLANGIOGRAM;  Surgeon: Prabhu Hernandes MD;  Location: [facility] OR;  Service: General;  Laterality: N/A;    TUBAL LIGATION          Social History     Socioeconomic History    Marital status: Single   Tobacco Use    Smoking status: Former     Current packs/day: 0.00     Average packs/day: 0.3 packs/day for 23.0 years (5.8 ttl pk-yrs)     Types: Cigarettes     Start date: 1991     Quit date:      Years since quittin.2    Smokeless tobacco: Never   Substance and Sexual Activity    Alcohol use: Never    Drug use: Not Currently     Types: Marijuana     Comment: Medical    Sexual activity: Not Currently     Partners: Male     Birth control/protection: See Surgical Hx   Social History Narrative    ** Merged History Encounter **          Social Drivers of Health     Financial Resource Strain: High Risk (2023)    Overall Financial Resource Strain (CARDIA)     Difficulty of Paying Living Expenses: Very hard   Food Insecurity: Food Insecurity Present (2023)    Hunger Vital Sign     Worried About Running Out of Food in the Last Year: Often true     Ran Out of Food in the Last Year: Sometimes true   Transportation Needs: No Transportation Needs (2023)    PRAPARE - Transportation     Lack of Transportation (Medical): No     Lack of Transportation (Non-Medical): No   Physical Activity: Unknown (2023)    Exercise Vital Sign     Days of Exercise per Week: 0 days   Recent Concern: Physical Activity - Inactive (2023)    Exercise Vital Sign     Days of Exercise per Week: 0 days     Minutes of Exercise per Session: 0 min  "  Stress: No Stress Concern Present (12/14/2023)    [place] citizen [place] of Occup[employer] - Occupational Stress Questionnaire     Feeling of Stress : Not at all   Housing Stability: High Risk (12/14/2023)    Housing Stability Vital Sign     Unable to Pay for Housing in the Last Year: Yes     Number of Places Lived in the Last Year: 3     Unstable Housing in the Last Year: No        Family History   Problem Relation Name Age of Onset    Breast cancer Maternal Grandmother Grandmother         onset unknown    Diabetes Father Sawyer pepe     Hypertension Father Sawyer pepe     Heart disease Father Sawyer pepe     Skin cancer Father Sawyer pepe     Clotting disorder Mother      Ovarian cancer Sister Irina Choe         "early 30's"        Current Outpatient Medications   Medication Instructions    atorvastatin (LIPITOR) 40 mg, Oral, Nightly    blood sugar diagnostic Strp To check BG one times daily, to use with insurance preferred meter    blood-glucose meter kit To check BG one times daily, to use with insurance preferred meter    citalopram (CELEXA) 20 mg, Oral, Daily    citalopram (CELEXA) 10 mg, Oral, Daily    clonazePAM (KLONOPIN) 1 mg, Oral, 2 times daily, as needed for anxiety.    cyclobenzaprine (FLEXERIL) 10 mg, Oral, 3 times daily PRN    empagliflozin (JARDIANCE) 10 mg, Oral, Daily    IBSRELA 50 mg, Oral, 2 times daily    lancets Misc To check BG one times daily, to use with insurance preferred meter    metFORMIN (GLUCOPHAGE-XR) 500 mg, Oral, 2 times daily with meals    metoprolol succinate (TOPROL-XL) 25 mg, Oral    metoprolol tartrate (LOPRESSOR) 25 MG tablet 1 tablet, Every morning    MOUNJARO 2.5 mg, Subcutaneous, Every 7 days    mupirocin (BACTROBAN) 2 % ointment Topical (Top), 3 times daily    naloxone (NARCAN) 4 mg/actuation [place] Call 911. Administer a single spray intranasally into one nostril upon signs of opioid overdose. May repeat after 3 minutes if no response.    ondansetron (ZOFRAN-ODT) " 8 mg, Oral, Every 6 hours PRN    ONETOUCH DELICA PLUS LANCET 30 gauge Misc     ONETOUCH ULTRA2 METER Misc USE 1 TO CHECK GLUCOSE ONCE DAILY    sumatriptan (IMITREX) 100 mg, Oral, Every 2 hours PRN, Not to exceed 200mg in 24 hours    traMADoL (ULTRAM) 50 mg tablet        Review of patient's allergies indicates:   Allergen Reactions    Latex, natural rubber Hives, Itching and Swelling    Latex Nausea And Vomiting        Immunization History   Administered Date(s) Administered    Hepatitis B (recombinant) Adjuvanted, 2 dose 07/31/2024    Pneumococcal Conjugate - 20 Valent 06/27/2023    Tdap 07/31/2024    Zoster Recombinant 06/27/2023, 07/31/2024        Patient Care Team:  Renee Love FNP as PCP - General (Family Medicine)  [facility], Raji BLACK MD as Consulting Physician (Orthopedic Surgery)  Med[facility], [facility] (Physical Therapy)  [facility] as Nurse Practitioner (Behavioral Health)    Subjective:     Review of Systems   Constitutional:  Negative for appetite change, chills, diaphoresis and fever.   HENT:  Negative for ear pain, sinus pain and sore throat.    Eyes:  Negative for pain and visual disturbance.   Respiratory:  Negative for cough, shortness of breath and wheezing.    Cardiovascular:  Negative for chest pain, palpitations and leg swelling.   Gastrointestinal:  Negative for abdominal pain, blood in stool, diarrhea, nausea and vomiting.   Endocrine: Negative for cold intolerance.   Genitourinary:  Negative for difficulty urinating, dysuria, frequency and hematuria.   Musculoskeletal:  Negative for arthralgias, joint swelling and myalgias.   Skin:  Negative for color change and rash.   Allergic/Immunologic: Negative.    Neurological: Negative.  Negative for dizziness, syncope, light-headedness and numbness.   Hematological: Negative.    Psychiatric/Behavioral:  Negative for dysphoric mood and suicidal ideas. The patient is nervous/anxious.    All  "other systems reviewed and are negative.      12 point review of systems conducted, negative except as stated in the history of present illness. See HPI for details.    Objective:     Visit Vitals  BP (!) 143/88   Pulse 84   Temp 98 °F (36.7 °C) (Temporal)   Resp 16   Ht 5' 6" (1.676 m)   Wt 98 kg (216 lb 0.8 oz)   SpO2 95%   BMI 34.87 kg/m²       Physical Exam  Vitals and nursing note reviewed.   Constitutional:       General: She is not in acute distress.     Appearance: She is not ill-appearing.   HENT:      Head: Normocephalic and atraumatic.      Mouth/Throat:      Mouth: Mucous membranes are moist.      Pharynx: Oropharynx is clear.   Eyes:      General: No scleral icterus.     Extraocular Movements: Extraocular movements intact.      Conjunctiva/sclera: Conjunctivae normal.      Pupils: Pupils are equal, round, and reactive to light.   Neck:      Vascular: No carotid bruit.   Cardiovascular:      Rate and Rhythm: Normal rate and regular rhythm.      Heart sounds: No murmur heard.     No friction rub. No gallop.   Pulmonary:      Effort: Pulmonary effort is normal. No respiratory distress.      Breath sounds: Normal breath sounds. No wheezing, rhonchi or rales.   Abdominal:      General: Abdomen is flat. Bowel sounds are normal. There is no distension.      Palpations: Abdomen is soft. There is no mass.      Tenderness: There is no abdominal tenderness.   Musculoskeletal:         General: Normal range of motion.      Cervical back: Normal range of motion and neck supple.   Skin:     General: Skin is warm and dry.      Findings: Rash present. Rash is scaling.          Neurological:      General: No focal deficit present.      Mental Status: She is alert.   Psychiatric:         Mood and Affect: Mood normal.         Assessment:       ICD-10-CM ICD-9-CM   1. Well adult exam  Z00.00 V70.0   2. Hypertension, unspecified type  I10 401.9   3. Type 2 diabetes mellitus without complication, without long-term current use " of insulin  E11.9 250.00   4. BMI 34.0-34.9,adult  Z68.34 V85.34   5. Anxiety  F41.9 300.00   6. Rash  R21 782.1        Plan:     1. Well adult exam  Overview:  Positive ASCUS HPV PAP 8/23/23, PAP today,neg HPV, continues to spot during her cycle, referred to GYN.  No cervical discharge, lesions or tenderness. No vaginal discharge or lesions. No labial lesions, erythema. (Chaperone present)     Assessment & Plan:  Lab work reviewed with patient  Continue current medication  Continue diet/exercise  Advanced directive discussed with patient  Return to clinic with any concerns    Advance Care Planning    Date: 03/31/2025  Patient did not wish or was not able to name a surrogate decision maker or provide an Advance Care Plan.           2. Hypertension, unspecified type  Overview:  On metoprolol, well controled.       3. Type 2 diabetes mellitus without complication, without long-term current use of insulin  Overview:  , did eat, will increase her dose of metformin and ozempic. Advised to check morning glucose.   Lab Results   Component Value Date    HGBA1C 7.8 (H) 07/01/2024    HGBA1C 6.9 03/28/2024    .00 07/01/2024    CREATININE 0.83 07/01/2024      Diabetes Medications               metFORMIN (GLUCOPHAGE) 1000 MG tablet Take 1 tablet (1,000 mg total) by mouth 2 (two) times daily with meals.    OZEMPIC 0.25 mg or 0.5 mg (2 mg/3 mL) pen injector Inject 0.5 mg into the skin every 7 days.          On ACE and Statin according to guidelines.  Discussed caution with SGLT2s + diuretics as concomitant use can cause volume depletion. Discussed caution with DPP-Ivs and HF risk.  Follow ADA Diet. Avoid soda, simple sweets, and limit rice/pasta/breads/starches (no more than 45-50 grams per meal).  Maintain healthy weight with goal BMI <30.  Exercise 5 times per week for 30 minutes per day.  Stressed importance of daily foot exams.  Stressed importance of annual dilated eye exam.          Assessment & Plan:  Ozempic  causes bloating, constipation, nausea, we will change to toes appetite.  We will see patient back in 2 weeks.  Advised patient to check blood sugars daily.  Discussed side effects of GLP 1.    Orders:  -     tirzepatide (MOUNJARO) 2.5 mg/0.5 mL PnIj; Inject 2.5 mg into the skin every 7 days.  Dispense: 2 mL; Refill: 1    4. BMI 34.0-34.9,adult  Overview:  >>OVERVIEW FOR OBESITY (BMI 35.0-39.9 WITHOUT COMORBIDITY) WRITTEN ON 5/30/2024 12:31 PM BY NATHALY TORRES FNP      Diabetic education is ordered for her.      5. Anxiety  Overview:  Anxiety- citalopram dose increased to 20mg.    Assessment & Plan:  Increased anxiety.  We will increase citalopram to 30 mg daily.  Return to clinic in 2 weeks.    Orders:  -     citalopram (CELEXA) 20 MG tablet; Take 1 tablet (20 mg total) by mouth once daily.  Dispense: 30 tablet; Refill: 11  -     citalopram (CELEXA) 10 MG tablet; Take 1 tablet (10 mg total) by mouth once daily.  Dispense: 30 tablet; Refill: 11    6. Rash  Assessment & Plan:  Rashes itchy and red.  And going on for over a year.  Refer to dermatology.    Orders:  -     Ambulatory referral/consult to Dermatology; Future; Expected date: 04/07/2025             A comprehensive HEALTH RISK ASSESSMENT was completed today. Results are summarized below:                  The patient is NOT A TOBACCO USER.  The patient reports NO SIGNIFICANT ALCOHOL USE.     All Questions regarding food, transportation or housing were not answered today.    The patient was asked and declined the use of a free .    Advance Care Planning   Advance Care Planning     Date: 03/31/2025  Patient did not wish or was not able to name a surrogate decision maker or provide an Advance Care Plan.       I attest to discussing Advance Care Planning with patient and/or family member.  Education was provided including the importance of the Health Care Power of , Advance Directives, and/or [place] documentation.  The patient expressed  understanding to the importance of this information and discussion.       Provided patient with a 5-10 year written screening schedule and personal prevention plan. Recommendations were developed using the USPSTF age appropriate recommendations. Education, counseling, and referrals were provided as needed. After Visit Summary printed and given to patient, which includes a list of additional screenings\tests needed.    Follow up in about 2 weeks (around 4/14/2025) for routine follow up, blood pressure check, DM. In addition to their scheduled follow up, the patient has also been instructed to follow up on as needed basis.     Future Appointments   Date Time Provider Department Center   10/13/2025 10:30 AM Nelia Puckett FNP [facility]

## 2025-03-31 NOTE — ASSESSMENT & PLAN NOTE
Lab work reviewed with patient  Continue current medication  Continue diet/exercise  Advanced directive discussed with patient  Return to clinic with any concerns    Advance Care Planning     Date: 03/31/2025  Patient did not wish or was not able to name a surrogate decision maker or provide an Advance Care Plan.

## 2025-04-14 ENCOUNTER — OFFICE VISIT (OUTPATIENT)
Dept: FAMILY MEDICINE | Facility: CLINIC | Age: 55
End: 2025-04-14
Payer: MEDICAID

## 2025-04-14 VITALS
WEIGHT: 223 LBS | HEART RATE: 77 BPM | SYSTOLIC BLOOD PRESSURE: 130 MMHG | DIASTOLIC BLOOD PRESSURE: 70 MMHG | RESPIRATION RATE: 16 BRPM | BODY MASS INDEX: 35.84 KG/M2 | OXYGEN SATURATION: 99 % | TEMPERATURE: 97 F | HEIGHT: 66 IN

## 2025-04-14 DIAGNOSIS — E11.9 TYPE 2 DIABETES MELLITUS WITHOUT COMPLICATION, WITHOUT LONG-TERM CURRENT USE OF INSULIN: ICD-10-CM

## 2025-04-14 DIAGNOSIS — F41.9 ANXIETY: Primary | ICD-10-CM

## 2025-04-14 DIAGNOSIS — L03.90 CELLULITIS, UNSPECIFIED CELLULITIS SITE: ICD-10-CM

## 2025-04-14 DIAGNOSIS — R45.86 REBOUND MOOD SWINGS: ICD-10-CM

## 2025-04-14 DIAGNOSIS — G47.00 INSOMNIA, UNSPECIFIED TYPE: ICD-10-CM

## 2025-04-14 DIAGNOSIS — R74.8 ELEVATED LIVER ENZYMES: ICD-10-CM

## 2025-04-14 PROCEDURE — 99215 OFFICE O/P EST HI 40 MIN: CPT | Mod: PBBFAC | Performed by: NURSE PRACTITIONER

## 2025-04-14 PROCEDURE — 99999 PR PBB SHADOW E&M-EST. PATIENT-LVL V: CPT | Mod: PBBFAC,,, | Performed by: NURSE PRACTITIONER

## 2025-04-14 RX ORDER — DOXYCYCLINE 100 MG/1
100 CAPSULE ORAL 2 TIMES DAILY
Qty: 28 CAPSULE | Refills: 0 | Status: SHIPPED | OUTPATIENT
Start: 2025-04-14

## 2025-04-14 RX ORDER — AMITRIPTYLINE HYDROCHLORIDE 25 MG/1
25 TABLET, FILM COATED ORAL NIGHTLY PRN
Qty: 30 TABLET | Refills: 1 | Status: SHIPPED | OUTPATIENT
Start: 2025-04-14 | End: 2026-04-14

## 2025-04-14 NOTE — ASSESSMENT & PLAN NOTE
Having another outbreak lesions generalized as well as under the skin, refer to derm, has appointment day and time.

## 2025-04-14 NOTE — PROGRESS NOTES
Family Medicine    Patient ID: [id number]     Chief Complaint: Med Change Follow Up and Rash      HPI:     Raquel Byrne is here today to assess how the celexa is working, she has not got her appointment for derm. Mpre lesions are popping out.    Past Medical History:   Diagnosis Date    Abdominal pain 2023    Abnormal vaginal bleeding     Anxiety     Candidiasis of breast 2023    Cervical cancer screening 2023    Constipation     Hypertension     Internal hemorrhoids without complication 2024    Medial meniscus tear     right knee    Muscle spasm 2024    Having muscle spasms.  Started 2 to 3 weeks ago she says.  I will run a CBC and a magnesium level.      Nausea and vomiting 2023    Ovarian cyst     Screening for colon cancer 04/10/2023    Positive ASCUS HPV PAP 23, PAP today,neg HPV, continues to spot during her cycle, referred to GYN.  No cervical discharge, lesions or tenderness. No vaginal discharge or lesions. No labial lesions, erythema. (Chaperone present)       Torn ACL     right knee        Past Surgical History:   Procedure Laterality Date    ANTERIOR CRUCIATE LIGAMENT REPAIR      CHOLECYSTECTOMY      HYSTEROSCOPY WITH DILATION AND CURETTAGE OF UTERUS N/A 2024    Procedure: HYSTEROSCOPY, WITH DILATION AND CURETTAGE OF UTERUS;  Surgeon: Jose Luis Ford MD;  Location: [facility] OR;  Service: OB/GYN;  Laterality: N/A;    INCISION AND DRAINAGE Left     leg left    LAPAROSCOPIC CHOLECYSTECTOMY WITH CHOLANGIOGRAPHY N/A 2023    Procedure: CHOLECYSTECTOMY, LAPAROSCOPIC, WITH CHOLANGIOGRAM;  Surgeon: Prabhu Hernandes MD;  Location: [facility] OR;  Service: General;  Laterality: N/A;    TUBAL LIGATION          Social History     Tobacco Use    Smoking status: Former     Current packs/day: 0.00     Average packs/day: 0.3 packs/day for 23.0 years (5.8 ttl pk-yrs)     Types: Cigarettes     Start date: 1991     Quit date: 2014     Years since quittin.2    Smokeless  tobacco: Never   Substance and Sexual Activity    Alcohol use: Never    Drug use: Not Currently     Types: Marijuana     Comment: Medical    Sexual activity: Not Currently     Partners: Male     Birth control/protection: See Surgical Hx        Current Outpatient Medications   Medication Instructions    amitriptyline (ELAVIL) 25 mg, Oral, Nightly PRN    atorvastatin (LIPITOR) 40 mg, Oral, Nightly    blood sugar diagnostic Strp To check BG one times daily, to use with insurance preferred meter    blood-glucose meter kit To check BG one times daily, to use with insurance preferred meter    citalopram (CELEXA) 20 mg, Oral, Daily    citalopram (CELEXA) 10 mg, Oral, Daily    clonazePAM (KLONOPIN) 1 mg, Oral, 2 times daily, as needed for anxiety.    cyclobenzaprine (FLEXERIL) 10 mg, Oral, 3 times daily PRN    doxycycline (VIBRAMYCIN) 100 mg, Oral, 2 times daily    empagliflozin (JARDIANCE) 10 mg, Oral, Daily    IBSRELA 50 mg, Oral, 2 times daily    lancets Misc To check BG one times daily, to use with insurance preferred meter    metFORMIN (GLUCOPHAGE-XR) 500 mg, Oral, 2 times daily with meals    metoprolol succinate (TOPROL-XL) 25 mg, Oral    metoprolol tartrate (LOPRESSOR) 25 MG tablet 1 tablet, Every morning    mupirocin (BACTROBAN) 2 % ointment Topical (Top), 3 times daily    naloxone (NARCAN) 4 mg/actuation [place] Call 911. Administer a single spray intranasally into one nostril upon signs of opioid overdose. May repeat after 3 minutes if no response.    ondansetron (ZOFRAN-ODT) 8 mg, Oral, Every 6 hours PRN    ONETOUCH DELICA PLUS LANCET 30 gauge Misc     ONETOUCH ULTRA2 METER Misc USE 1 TO CHECK GLUCOSE ONCE DAILY    sumatriptan (IMITREX) 100 mg, Oral, Every 2 hours PRN, Not to exceed 200mg in 24 hours    TRULICITY 0.75 mg, Subcutaneous, Every 7 days       Review of patient's allergies indicates:   Allergen Reactions    Latex, natural rubber Hives, Itching and Swelling    Latex Nausea And Vomiting        Patient Care  "Team:  Renee Love FNP as PCP - General (Family Medicine)  Eliseo, Raji BLACK MD as Consulting Physician (Orthopedic Surgery)  [facility] (Physical Therapy)  [facility] -  ErikaRadha benavidez PMHNP as Nurse Practitioner (Behavioral Health)     Subjective:     Review of Systems   Constitutional:  Negative for appetite change, chills, diaphoresis and fever.   HENT:  Negative for ear pain, sinus pain and sore throat.    Eyes:  Negative for pain and visual disturbance.   Respiratory:  Negative for cough, shortness of breath and wheezing.    Cardiovascular:  Negative for chest pain, palpitations and leg swelling.   Gastrointestinal:  Negative for abdominal pain, blood in stool, diarrhea, nausea and vomiting.   Endocrine: Negative for cold intolerance.   Genitourinary:  Negative for difficulty urinating, dysuria, frequency and hematuria.   Musculoskeletal:  Negative for arthralgias, joint swelling and myalgias.   Skin:  Negative for color change and rash.   Allergic/Immunologic: Negative.    Neurological: Negative.  Negative for dizziness, syncope, light-headedness and numbness.   Hematological: Negative.    Psychiatric/Behavioral: Negative.  Negative for dysphoric mood and suicidal ideas. The patient is not nervous/anxious.    All other systems reviewed and are negative.      12 point review of systems conducted, negative except as stated in the history of present illness. See HPI for details.    Objective:     Visit Vitals  /70   Pulse 77   Temp 97 °F (36.1 °C) (Temporal)   Resp 16   Ht 5' 6" (1.676 m)   Wt 101.2 kg (223 lb)   SpO2 99%   BMI 35.99 kg/m²       Physical Exam  Vitals and nursing note reviewed.   Constitutional:       General: She is not in acute distress.     Appearance: She is not ill-appearing.   HENT:      Head: Normocephalic and atraumatic.      Mouth/Throat:      Mouth: Mucous membranes are moist.      Pharynx: Oropharynx is clear.   Eyes:      " General: No scleral icterus.     Extraocular Movements: Extraocular movements intact.      Conjunctiva/sclera: Conjunctivae normal.      Pupils: Pupils are equal, round, and reactive to light.   Neck:      Vascular: No carotid bruit.   Cardiovascular:      Rate and Rhythm: Normal rate and regular rhythm.      Heart sounds: No murmur heard.     No friction rub. No gallop.   Pulmonary:      Effort: Pulmonary effort is normal. No respiratory distress.      Breath sounds: Normal breath sounds. No wheezing, rhonchi or rales.   Abdominal:      General: Abdomen is flat. Bowel sounds are normal. There is no distension.      Palpations: Abdomen is soft. There is no mass.      Tenderness: There is no abdominal tenderness.   Musculoskeletal:         General: Normal range of motion.      Cervical back: Normal range of motion and neck supple.   Skin:     General: Skin is warm and dry.      Findings: Abscess and rash present. Rash is crusting and scaling.      Comments: Generalized   Neurological:      General: No focal deficit present.      Mental Status: She is alert.   Psychiatric:         Mood and Affect: Mood normal.         Labs Reviewed:   Labs reviewed with patient, verbalized understanding.  Chemistry:  Lab Results   Component Value Date     02/19/2025    K 4.2 02/19/2025    BUN 15.0 02/19/2025    CREATININE 0.75 02/19/2025    EGFRNORACEVR >60 02/19/2025    GLUCOSE 96 02/19/2025    CALCIUM 9.9 02/19/2025    ALKPHOS 86 02/19/2025    LABPROT 9.1 (H) 02/19/2025    ALBUMIN 3.8 02/19/2025    AST 39 (H) 02/19/2025    ALT 44 02/19/2025    MG 2.00 05/09/2024    TSH 1.627 02/19/2025    HFMVAJ0BDMF 1.04 09/17/2024        Lab Results   Component Value Date    HGBA1C 5.8 02/28/2025        Hematology:  Lab Results   Component Value Date    WBC 5.26 02/19/2025    HGB 15.3 02/19/2025    HCT 45.8 02/19/2025     02/19/2025       Lipid Panel:  Lab Results   Component Value Date    CHOL 205 (H) 02/19/2025    HDL 64 (H)  02/19/2025    .00 02/19/2025    TRIG 71 02/19/2025    TOTALCHOLEST 3 02/19/2025        Urine:  Lab Results   Component Value Date    APPEARANCEUA Clear 02/19/2025    SGUA 1.025 02/19/2025    PROTEINUA Negative 02/19/2025    KETONESUA Negative 02/19/2025    LEUKOCYTESUR Negative 02/19/2025    RBCUA 0-2 04/05/2023    WBCUA 0-2 04/05/2023    BACTERIA Rare 04/05/2023    CREATRANDUR 64.7 07/01/2024        Assessment:       ICD-10-CM ICD-9-CM   1. Anxiety  F41.9 300.00   2. Rebound mood swings  R45.86 296.99   3. Insomnia, unspecified type  G47.00 780.52   4. Elevated liver enzymes  R74.8 790.5   5. Type 2 diabetes mellitus without complication, without long-term current use of insulin  E11.9 250.00   6. Cellulitis, unspecified cellulitis site  L03.90 682.9        Plan:     1. Anxiety  Overview:  Anxiety- citalopram dose increased to 20mg.    Assessment & Plan:  Noted improved anxiety with citalopram, 20mg dose.    Orders:  -     CBC Auto Differential; Future; Expected date: 04/14/2025  -     Comprehensive Metabolic Panel; Future; Expected date: 04/14/2025  -     TSH; Future; Expected date: 04/14/2025    2. Rebound mood swings  Overview:  On fluoxetine, 40mg, LFT is elevated, she is switched to citalopram, per UTD recommendations, she will decrease her dose of fluoxetine to 20mg and after procedure will start citalopram. Will reassess efficacy in 2 weeks.    Assessment & Plan:  Changed to Celexa 30mg am, is working good for her.       3. Insomnia, unspecified type  Assessment & Plan:  Sleeping 2-3 hours, will start elavil, advised 1/2 tab first week, do not take after 11pm.    Orders:  -     amitriptyline (ELAVIL) 25 MG tablet; Take 1 tablet (25 mg total) by mouth nightly as needed for Insomnia (Start with half tab for 1 week. Do not take later than 11pm.).  Dispense: 30 tablet; Refill: 1    4. Elevated liver enzymes  Overview:  Elevated LFT- will change from paroxetine to citalopram after procedure, tomorrow she  will decrease her dose of paroxetine to 20mg,and in 1 week will reassess LFT, will also assess for thyroid and iron levels, as this could also cause elevated LFT.    Assessment & Plan:  LFTs are improved. Will continue to monitor.    Orders:  -     Lipid Panel; Future; Expected date: 04/14/2025    5. Type 2 diabetes mellitus without complication, without long-term current use of insulin  Overview:  , did eat, will increase her dose of metformin and ozempic. Advised to check morning glucose.   Lab Results   Component Value Date    HGBA1C 7.8 (H) 07/01/2024    HGBA1C 6.9 03/28/2024    .00 07/01/2024    CREATININE 0.83 07/01/2024      Diabetes Medications               metFORMIN (GLUCOPHAGE) 1000 MG tablet Take 1 tablet (1,000 mg total) by mouth 2 (two) times daily with meals.    OZEMPIC 0.25 mg or 0.5 mg (2 mg/3 mL) pen injector Inject 0.5 mg into the skin every 7 days.          On ACE and Statin according to guidelines.  Discussed caution with SGLT2s + diuretics as concomitant use can cause volume depletion. Discussed caution with DPP-Ivs and HF risk.  Follow ADA Diet. Avoid soda, simple sweets, and limit rice/pasta/breads/starches (no more than 45-50 grams per meal).  Maintain healthy weight with goal BMI <30.  Exercise 5 times per week for 30 minutes per day.  Stressed importance of daily foot exams.  Stressed importance of annual dilated eye exam.          Assessment & Plan:  BG is  on ozempic.    Orders:  -     Hemoglobin A1C; Future; Expected date: 04/14/2025  -     Urinalysis; Future; Expected date: 04/14/2025    6. Cellulitis, unspecified cellulitis site  Assessment & Plan:  Having another outbreak lesions generalized as well as under the skin, refer to derm, has appointment day and time.    Orders:  -     doxycycline (VIBRAMYCIN) 100 MG Cap; Take 1 capsule (100 mg total) by mouth 2 (two) times daily.  Dispense: 28 capsule; Refill: 0         Follow up in about 2 weeks (around 4/28/2025) for  routine follow up- abx-. In addition to their scheduled follow up, the patient has also been instructed to follow up on as needed basis.     Future Appointments   Date Time Provider Department Center   10/13/2025 10:30 AM Nelia Puckett FNP [facility] GASTRO [place]    4/7/2026  9:15 AM Renee Love FNP [facility] FAMMED [facility]

## 2025-06-04 DIAGNOSIS — E11.9 TYPE 2 DIABETES MELLITUS WITHOUT COMPLICATION, WITHOUT LONG-TERM CURRENT USE OF INSULIN: ICD-10-CM

## 2025-06-04 RX ORDER — DULAGLUTIDE 0.75 MG/.5ML
0.75 INJECTION, SOLUTION SUBCUTANEOUS
Qty: 4 PEN | Refills: 1 | Status: SHIPPED | OUTPATIENT
Start: 2025-06-04

## 2025-06-17 DIAGNOSIS — R74.8 ELEVATED LIVER ENZYMES: ICD-10-CM

## 2025-06-17 DIAGNOSIS — Z12.31 BREAST CANCER SCREENING BY MAMMOGRAM: Primary | ICD-10-CM

## 2025-06-17 DIAGNOSIS — E11.9 TYPE 2 DIABETES MELLITUS WITHOUT COMPLICATION, WITHOUT LONG-TERM CURRENT USE OF INSULIN: ICD-10-CM

## 2025-06-17 DIAGNOSIS — F41.9 ANXIETY: ICD-10-CM

## 2025-06-30 DIAGNOSIS — E11.9 TYPE 2 DIABETES MELLITUS WITHOUT COMPLICATION, WITHOUT LONG-TERM CURRENT USE OF INSULIN: ICD-10-CM

## 2025-06-30 RX ORDER — DULAGLUTIDE 0.75 MG/.5ML
0.75 INJECTION, SOLUTION SUBCUTANEOUS
Qty: 4 PEN | Refills: 1 | OUTPATIENT
Start: 2025-06-30

## 2025-07-09 ENCOUNTER — LAB VISIT (OUTPATIENT)
Dept: LAB | Facility: HOSPITAL | Age: 55
End: 2025-07-09
Attending: NURSE PRACTITIONER
Payer: MEDICAID

## 2025-07-09 DIAGNOSIS — E11.9 TYPE 2 DIABETES MELLITUS WITHOUT COMPLICATION, WITHOUT LONG-TERM CURRENT USE OF INSULIN: ICD-10-CM

## 2025-07-09 DIAGNOSIS — E11.9 TYPE 2 DIABETES MELLITUS WITHOUT COMPLICATION, WITHOUT LONG-TERM CURRENT USE OF INSULIN: Primary | ICD-10-CM

## 2025-07-09 DIAGNOSIS — F41.9 ANXIETY: ICD-10-CM

## 2025-07-09 DIAGNOSIS — R74.8 ELEVATED LIVER ENZYMES: ICD-10-CM

## 2025-07-09 LAB
ALBUMIN SERPL-MCNC: 3.7 G/DL (ref 3.5–5)
ALBUMIN/CREAT UR: 6.9 MG/GM CR (ref 0–30)
ALBUMIN/GLOB SERPL: 0.7 RATIO (ref 1.1–2)
ALP SERPL-CCNC: 93 UNIT/L (ref 40–150)
ALT SERPL-CCNC: 50 UNIT/L (ref 0–55)
ANION GAP SERPL CALC-SCNC: 7 MEQ/L
AST SERPL-CCNC: 61 UNIT/L (ref 11–45)
BACTERIA #/AREA URNS AUTO: ABNORMAL /HPF
BASOPHILS # BLD AUTO: 0.04 X10(3)/MCL
BASOPHILS NFR BLD AUTO: 1.1 %
BILIRUB SERPL-MCNC: 1 MG/DL
BILIRUB UR QL STRIP.AUTO: NEGATIVE
BUN SERPL-MCNC: 11 MG/DL (ref 9.8–20.1)
CALCIUM SERPL-MCNC: 9.8 MG/DL (ref 8.4–10.2)
CHLORIDE SERPL-SCNC: 107 MMOL/L (ref 98–107)
CHOLEST SERPL-MCNC: 212 MG/DL
CHOLEST/HDLC SERPL: 3 {RATIO} (ref 0–5)
CLARITY UR: ABNORMAL
CO2 SERPL-SCNC: 26 MMOL/L (ref 22–29)
COLOR UR AUTO: YELLOW
CREAT SERPL-MCNC: 0.71 MG/DL (ref 0.55–1.02)
CREAT UR-MCNC: 128.7 MG/DL (ref 45–106)
CREAT/UREA NIT SERPL: 15
EOSINOPHIL # BLD AUTO: 0.22 X10(3)/MCL (ref 0–0.9)
EOSINOPHIL NFR BLD AUTO: 5.8 %
ERYTHROCYTE [DISTWIDTH] IN BLOOD BY AUTOMATED COUNT: 12.9 % (ref 11.5–17)
EST. AVERAGE GLUCOSE BLD GHB EST-MCNC: 139.9 MG/DL
GFR SERPLBLD CREATININE-BSD FMLA CKD-EPI: >60 ML/MIN/1.73/M2
GLOBULIN SER-MCNC: 5.5 GM/DL (ref 2.4–3.5)
GLUCOSE SERPL-MCNC: 119 MG/DL (ref 74–100)
GLUCOSE UR QL STRIP: NEGATIVE
HBA1C MFR BLD: 6.5 %
HCT VFR BLD AUTO: 46.3 % (ref 37–47)
HDLC SERPL-MCNC: 62 MG/DL (ref 35–60)
HGB BLD-MCNC: 15.1 G/DL (ref 12–16)
HGB UR QL STRIP: NEGATIVE
IMM GRANULOCYTES # BLD AUTO: 0.01 X10(3)/MCL (ref 0–0.04)
IMM GRANULOCYTES NFR BLD AUTO: 0.3 %
KETONES UR QL STRIP: NEGATIVE
LDLC SERPL CALC-MCNC: 131 MG/DL (ref 50–140)
LEUKOCYTE ESTERASE UR QL STRIP: NEGATIVE
LYMPHOCYTES # BLD AUTO: 1.07 X10(3)/MCL (ref 0.6–4.6)
LYMPHOCYTES NFR BLD AUTO: 28.2 %
MCH RBC QN AUTO: 33.1 PG (ref 27–31)
MCHC RBC AUTO-ENTMCNC: 32.6 G/DL (ref 33–36)
MCV RBC AUTO: 101.5 FL (ref 80–94)
MICROALBUMIN UR-MCNC: 8.9 UG/ML
MONOCYTES # BLD AUTO: 0.43 X10(3)/MCL (ref 0.1–1.3)
MONOCYTES NFR BLD AUTO: 11.3 %
NEUTROPHILS # BLD AUTO: 2.03 X10(3)/MCL (ref 2.1–9.2)
NEUTROPHILS NFR BLD AUTO: 53.3 %
NITRITE UR QL STRIP: NEGATIVE
NRBC BLD AUTO-RTO: 0 %
PH UR STRIP: 7 [PH]
PLATELET # BLD AUTO: 115 X10(3)/MCL (ref 130–400)
PMV BLD AUTO: 10 FL (ref 7.4–10.4)
POTASSIUM SERPL-SCNC: 4.1 MMOL/L (ref 3.5–5.1)
PROT SERPL-MCNC: 9.2 GM/DL (ref 6.4–8.3)
PROT UR QL STRIP: ABNORMAL
RBC # BLD AUTO: 4.56 X10(6)/MCL (ref 4.2–5.4)
RBC #/AREA URNS AUTO: ABNORMAL /HPF
SODIUM SERPL-SCNC: 140 MMOL/L (ref 136–145)
SP GR UR STRIP.AUTO: 1.02 (ref 1–1.03)
SQUAMOUS #/AREA URNS AUTO: ABNORMAL /HPF
TRIGL SERPL-MCNC: 94 MG/DL (ref 37–140)
TSH SERPL-ACNC: 2.05 UIU/ML (ref 0.35–4.94)
UROBILINOGEN UR STRIP-ACNC: >=8
VLDLC SERPL CALC-MCNC: 19 MG/DL
WBC # BLD AUTO: 3.8 X10(3)/MCL (ref 4.5–11.5)
WBC #/AREA URNS AUTO: ABNORMAL /HPF

## 2025-07-09 PROCEDURE — 85025 COMPLETE CBC W/AUTO DIFF WBC: CPT

## 2025-07-09 PROCEDURE — 80061 LIPID PANEL: CPT

## 2025-07-09 PROCEDURE — 81003 URINALYSIS AUTO W/O SCOPE: CPT

## 2025-07-09 PROCEDURE — 80053 COMPREHEN METABOLIC PANEL: CPT

## 2025-07-09 PROCEDURE — 36415 COLL VENOUS BLD VENIPUNCTURE: CPT

## 2025-07-09 PROCEDURE — 83036 HEMOGLOBIN GLYCOSYLATED A1C: CPT

## 2025-07-09 PROCEDURE — 82570 ASSAY OF URINE CREATININE: CPT

## 2025-07-09 PROCEDURE — 84443 ASSAY THYROID STIM HORMONE: CPT

## 2025-07-10 ENCOUNTER — CLINICAL SUPPORT (OUTPATIENT)
Dept: FAMILY MEDICINE | Facility: CLINIC | Age: 55
End: 2025-07-10
Attending: NURSE PRACTITIONER
Payer: MEDICAID

## 2025-07-10 ENCOUNTER — OFFICE VISIT (OUTPATIENT)
Dept: FAMILY MEDICINE | Facility: CLINIC | Age: 55
End: 2025-07-10
Payer: MEDICAID

## 2025-07-10 ENCOUNTER — CLINICAL SUPPORT (OUTPATIENT)
Dept: FAMILY MEDICINE | Facility: CLINIC | Age: 55
End: 2025-07-10
Attending: FAMILY MEDICINE
Payer: MEDICAID

## 2025-07-10 VITALS
WEIGHT: 223.13 LBS | DIASTOLIC BLOOD PRESSURE: 88 MMHG | SYSTOLIC BLOOD PRESSURE: 133 MMHG | HEIGHT: 66 IN | RESPIRATION RATE: 16 BRPM | OXYGEN SATURATION: 99 % | BODY MASS INDEX: 35.86 KG/M2 | TEMPERATURE: 98 F | HEART RATE: 87 BPM

## 2025-07-10 DIAGNOSIS — E11.9 TYPE 2 DIABETES MELLITUS WITHOUT COMPLICATION, WITHOUT LONG-TERM CURRENT USE OF INSULIN: ICD-10-CM

## 2025-07-10 DIAGNOSIS — M54.31 SCIATICA OF RIGHT SIDE: ICD-10-CM

## 2025-07-10 DIAGNOSIS — R11.2 NAUSEA AND VOMITING, UNSPECIFIED VOMITING TYPE: ICD-10-CM

## 2025-07-10 DIAGNOSIS — E11.9 TYPE 2 DIABETES MELLITUS WITHOUT COMPLICATION, WITHOUT LONG-TERM CURRENT USE OF INSULIN: Primary | ICD-10-CM

## 2025-07-10 DIAGNOSIS — D53.9 MACROCYTIC ANEMIA: ICD-10-CM

## 2025-07-10 DIAGNOSIS — M62.838 MUSCLE SPASM: ICD-10-CM

## 2025-07-10 DIAGNOSIS — F41.9 ANXIETY: ICD-10-CM

## 2025-07-10 LAB — GLUCOSE SERPL-MCNC: 203 MG/DL (ref 70–110)

## 2025-07-10 PROCEDURE — 99999PBSHW PR PBB SHADOW TECHNICAL ONLY FILED TO HB: Mod: JZ,PBBFAC,,

## 2025-07-10 PROCEDURE — 3008F BODY MASS INDEX DOCD: CPT | Mod: CPTII,,, | Performed by: NURSE PRACTITIONER

## 2025-07-10 PROCEDURE — 96372 THER/PROPH/DIAG INJ SC/IM: CPT | Mod: PBBFAC

## 2025-07-10 PROCEDURE — 3075F SYST BP GE 130 - 139MM HG: CPT | Mod: CPTII,,, | Performed by: NURSE PRACTITIONER

## 2025-07-10 PROCEDURE — 99999PBSHW POCT GLUCOSE, HAND-HELD DEVICE: Mod: PBBFAC,,,

## 2025-07-10 PROCEDURE — 99214 OFFICE O/P EST MOD 30 MIN: CPT | Mod: PBBFAC | Performed by: NURSE PRACTITIONER

## 2025-07-10 PROCEDURE — 1159F MED LIST DOCD IN RCRD: CPT | Mod: CPTII,,, | Performed by: NURSE PRACTITIONER

## 2025-07-10 PROCEDURE — 3066F NEPHROPATHY DOC TX: CPT | Mod: CPTII,,, | Performed by: NURSE PRACTITIONER

## 2025-07-10 PROCEDURE — 99999 PR PBB SHADOW E&M-EST. PATIENT-LVL IV: CPT | Mod: PBBFAC,,, | Performed by: NURSE PRACTITIONER

## 2025-07-10 PROCEDURE — 99215 OFFICE O/P EST HI 40 MIN: CPT | Mod: S$PBB,,, | Performed by: NURSE PRACTITIONER

## 2025-07-10 PROCEDURE — 3044F HG A1C LEVEL LT 7.0%: CPT | Mod: CPTII,,, | Performed by: NURSE PRACTITIONER

## 2025-07-10 PROCEDURE — 92228 IMG RTA DETC/MNTR DS PHY/QHP: CPT | Mod: PBBFAC

## 2025-07-10 PROCEDURE — 82962 GLUCOSE BLOOD TEST: CPT | Mod: PBBFAC | Performed by: NURSE PRACTITIONER

## 2025-07-10 PROCEDURE — 3079F DIAST BP 80-89 MM HG: CPT | Mod: CPTII,,, | Performed by: NURSE PRACTITIONER

## 2025-07-10 PROCEDURE — 1160F RVW MEDS BY RX/DR IN RCRD: CPT | Mod: CPTII,,, | Performed by: NURSE PRACTITIONER

## 2025-07-10 PROCEDURE — 3061F NEG MICROALBUMINURIA REV: CPT | Mod: CPTII,,, | Performed by: NURSE PRACTITIONER

## 2025-07-10 PROCEDURE — 92228 IMG RTA DETC/MNTR DS PHY/QHP: CPT | Mod: TC,PBBFAC

## 2025-07-10 PROCEDURE — 99499 UNLISTED E&M SERVICE: CPT | Mod: S$PBB,,, | Performed by: NURSE PRACTITIONER

## 2025-07-10 RX ORDER — ONDANSETRON 8 MG/1
8 TABLET, ORALLY DISINTEGRATING ORAL EVERY 6 HOURS PRN
Qty: 45 TABLET | Refills: 0 | Status: SHIPPED | OUTPATIENT
Start: 2025-07-10

## 2025-07-10 RX ORDER — KETOROLAC TROMETHAMINE 30 MG/ML
30 INJECTION, SOLUTION INTRAMUSCULAR; INTRAVENOUS
Status: COMPLETED | OUTPATIENT
Start: 2025-07-10 | End: 2025-07-10

## 2025-07-10 RX ORDER — CLONAZEPAM 1 MG/1
1 TABLET ORAL 2 TIMES DAILY
Qty: 60 TABLET | Refills: 0 | Status: SHIPPED | OUTPATIENT
Start: 2025-07-10

## 2025-07-10 RX ORDER — DULAGLUTIDE 1.5 MG/.5ML
1.5 INJECTION, SOLUTION SUBCUTANEOUS
Qty: 4 PEN | Refills: 11 | Status: SHIPPED | OUTPATIENT
Start: 2025-07-10 | End: 2026-07-10

## 2025-07-10 RX ORDER — CITALOPRAM 40 MG/1
40 TABLET ORAL DAILY
Qty: 30 TABLET | Refills: 11 | Status: SHIPPED | OUTPATIENT
Start: 2025-07-10

## 2025-07-10 RX ORDER — CYCLOBENZAPRINE HCL 10 MG
10 TABLET ORAL 3 TIMES DAILY PRN
Qty: 45 TABLET | Refills: 2 | Status: SHIPPED | OUTPATIENT
Start: 2025-07-10

## 2025-07-10 RX ADMIN — KETOROLAC TROMETHAMINE 30 MG: 30 INJECTION, SOLUTION INTRAMUSCULAR; INTRAVENOUS at 08:07

## 2025-07-10 NOTE — PROGRESS NOTES
Raquel Byrne is a 55 y.o. female here for a diabetic eye screening with non-dilated fundus photos per ox.    Patient cooperative?: Yes  Small pupils?: No  Last eye exam: n/a    For exam results, see Encounter Report.

## 2025-07-10 NOTE — ASSESSMENT & PLAN NOTE
Increased anxiety, crying, feeling overwhelmed, advised  referral, increased celexa dose. RTC in 2 weeks for reassessment.

## 2025-07-10 NOTE — PROGRESS NOTES
Raquel Byrne is a 55 y.o. female here for a diabetic eye screening with non-dilated fundus photos per ***.    Patient cooperative?: Yes  Small pupils?: No  Last eye exam: n/a    For exam results, see Encounter Report.

## 2025-07-10 NOTE — PROGRESS NOTES
Family Medicine    Patient ID: [id number]     Chief Complaint: Medication Refill and Low-back Pain      HPI:     Raquel Byrne is here today for medication refill, low back pain.  Increased anxiety.    Past Medical History:   Diagnosis Date    Abdominal pain 2023    Abnormal vaginal bleeding     Anxiety     Candidiasis of breast 2023    Cervical cancer screening 2023    Constipation     Hypertension     Internal hemorrhoids without complication 2024    Medial meniscus tear     right knee    Muscle spasm 2024    Having muscle spasms.  Started 2 to 3 weeks ago she says.  I will run a CBC and a magnesium level.      Nausea and vomiting 2023    Ovarian cyst     Screening for colon cancer 04/10/2023    Positive ASCUS HPV PAP 23, PAP today,neg HPV, continues to spot during her cycle, referred to GYN.  No cervical discharge, lesions or tenderness. No vaginal discharge or lesions. No labial lesions, erythema. (Chaperone present)       Torn ACL     right knee        Past Surgical History:   Procedure Laterality Date    ANTERIOR CRUCIATE LIGAMENT REPAIR      CHOLECYSTECTOMY      HYSTEROSCOPY WITH DILATION AND CURETTAGE OF UTERUS N/A 2024    Procedure: HYSTEROSCOPY, WITH DILATION AND CURETTAGE OF UTERUS;  Surgeon: Jose Luis Ford MD;  Location: [facility] OR;  Service: OB/GYN;  Laterality: N/A;    INCISION AND DRAINAGE Left     leg left    LAPAROSCOPIC CHOLECYSTECTOMY WITH CHOLANGIOGRAPHY N/A 2023    Procedure: CHOLECYSTECTOMY, LAPAROSCOPIC, WITH CHOLANGIOGRAM;  Surgeon: Prabhu Hernandes MD;  Location: [facility] [place];  Service: General;  Laterality: N/A;    TUBAL LIGATION          Social History     Tobacco Use    Smoking status: Former     Current packs/day: 0.00     Average packs/day: 0.3 packs/day for 23.0 years (5.8 ttl pk-yrs)     Types: Cigarettes     Start date: 1991     Quit date:      Years since quittin.5    Smokeless tobacco: Never   Substance and Sexual  Activity    Alcohol use: Never    Drug use: Not Currently     Types: Marijuana     Comment: Medical    Sexual activity: Not Currently     Partners: Male     Birth control/protection: See Surgical Hx        Current Outpatient Medications   Medication Instructions    amitriptyline (ELAVIL) 25 mg, Oral, Nightly PRN    atorvastatin (LIPITOR) 40 mg, Oral, Nightly    blood sugar diagnostic Strp To check BG one times daily, to use with insurance preferred meter    blood-glucose meter kit To check BG one times daily, to use with insurance preferred meter    citalopram (CELEXA) 10 mg, Oral, Daily    citalopram (CELEXA) 40 mg, Oral, Daily    clonazePAM (KLONOPIN) 1 mg, Oral, 2 times daily, as needed for anxiety.    cyclobenzaprine (FLEXERIL) 10 mg, Oral, 3 times daily PRN    doxycycline (VIBRAMYCIN) 100 mg, Oral, 2 times daily    empagliflozin (JARDIANCE) 10 mg, Oral, Daily    IBSRELA 50 mg, Oral, 2 times daily    lancets Misc To check BG one times daily, to use with insurance preferred meter    metFORMIN (GLUCOPHAGE-XR) 500 mg, Oral, 2 times daily with meals    metoprolol succinate (TOPROL-XL) 25 mg, Oral    metoprolol tartrate (LOPRESSOR) 25 MG tablet 1 tablet, Every morning    mupirocin (BACTROBAN) 2 % ointment Topical (Top), 3 times daily    naloxone (NARCAN) 4 mg/actuation [place] Call 911. Administer a single spray intranasally into one nostril upon signs of opioid overdose. May repeat after 3 minutes if no response.    ondansetron (ZOFRAN-ODT) 8 mg, Oral, Every 6 hours PRN    ONETOUCH DELICA PLUS LANCET 30 gauge Misc     ONETOUCH ULTRA2 METER [facility] USE 1 TO CHECK GLUCOSE ONCE DAILY    sumatriptan (IMITREX) 100 mg, Oral, Every 2 hours PRN, Not to exceed 200mg in 24 hours    TRULICITY 1.5 mg, Subcutaneous, Every 7 days       Review of patient's allergies indicates:   Allergen Reactions    Latex, natural rubber Hives, Itching and Swelling    Latex Nausea And Vomiting        Patient Care Team:  Renee Love FNP as PCP - General  "(Family Medicine)  Raji Gomes MD as Consulting Physician (Orthopedic Surgery)  [facility] (Physical Therapy)  [facility]inchilla [facility] PMH[facility] as Nurse Practitioner (Behavioral Health)     Subjective:     Review of Systems   Constitutional:  Negative for appetite change, chills, diaphoresis and fever.   HENT:  Negative for ear pain, sinus pain and sore throat.    Eyes:  Negative for pain and visual disturbance.   Respiratory:  Negative for cough, shortness of breath and wheezing.    Cardiovascular:  Negative for chest pain, palpitations and leg swelling.   Gastrointestinal:  Negative for abdominal pain, blood in stool, diarrhea, nausea and vomiting.   Endocrine: Negative for cold intolerance.   Genitourinary:  Negative for difficulty urinating, dysuria, frequency and hematuria.   Musculoskeletal:  Positive for back pain (Right buttock.). Negative for arthralgias, joint swelling and myalgias.   Skin:  Negative for color change and rash.   Allergic/Immunologic: Negative.    Neurological: Negative.  Negative for dizziness, syncope, light-headedness and numbness.   Hematological: Negative.    Psychiatric/Behavioral:  Negative for dysphoric mood and suicidal ideas. The patient is nervous/anxious.    All other systems reviewed and are negative.      12 point review of systems conducted, negative except as stated in the history of present illness. See HPI for details.    Objective:     Visit Vitals  /88   Pulse 87   Temp 98 °F (36.7 °C) (Temporal)   Resp 16   Ht 5' 6" (1.676 m)   Wt 101.2 kg (223 lb 1.7 oz)   SpO2 99%   BMI 36.01 kg/m²       Physical Exam  Vitals and nursing note reviewed.   Constitutional:       General: She is not in acute distress.     Appearance: She is not ill-appearing.   HENT:      Head: Normocephalic and atraumatic.      Mouth/Throat:      Mouth: Mucous membranes are moist.      Pharynx: Oropharynx is clear.   Eyes:      General: " No scleral icterus.     Extraocular Movements: Extraocular movements intact.      Conjunctiva/sclera: Conjunctivae normal.      Pupils: Pupils are equal, round, and reactive to light.   Neck:      Vascular: No carotid bruit.   Cardiovascular:      Rate and Rhythm: Normal rate and regular rhythm.      Pulses:           Dorsalis pedis pulses are 2+ on the right side and 2+ on the left side.        Posterior tibial pulses are 2+ on the right side and 2+ on the left side.      Heart sounds: No murmur heard.     No friction rub. No gallop.   Pulmonary:      Effort: Pulmonary effort is normal. No respiratory distress.      Breath sounds: Normal breath sounds. No wheezing, rhonchi or rales.   Abdominal:      General: Abdomen is flat. Bowel sounds are normal. There is no distension.      Palpations: Abdomen is soft. There is no mass.      Tenderness: There is no abdominal tenderness.   Musculoskeletal:         General: Normal range of motion.      Cervical back: Normal range of motion and neck supple.      Right foot: No deformity.      Left foot: No deformity.   Feet:      Right foot:      Protective Sensation: 5 sites tested.  4 sites sensed.      Skin integrity: Skin integrity normal. No ulcer, blister, skin breakdown, erythema, warmth, callus, dry skin or fissure.      Toenail Condition: Right toenails are normal.      Left foot:      Protective Sensation: 5 sites tested.  5 sites sensed.      Skin integrity: Skin integrity normal. No ulcer, blister, skin breakdown, erythema, warmth, callus, dry skin or fissure.      Toenail Condition: Left toenails are normal.   Skin:     General: Skin is warm and dry.   Neurological:      General: No focal deficit present.      Mental Status: She is alert.   Psychiatric:         Mood and Affect: Mood is anxious. Affect is tearful.         Labs Reviewed:   Labs reviewed with patient, verbalized understanding.  Chemistry:  Lab Results   Component Value Date     07/09/2025    K 4.1  07/09/2025    BUN 11.0 07/09/2025    CREATININE 0.71 07/09/2025    EGFRNORACEVR >60 07/09/2025    CALCIUM 9.8 07/09/2025    ALKPHOS 93 07/09/2025    LABPROT 10.8 10/22/2024    ALBUMIN 3.7 07/09/2025    AST 61 (H) 07/09/2025    ALT 50 07/09/2025    MG 2.00 05/09/2024    TSH 2.053 07/09/2025    [id number] 1.04 09/17/2024        Lab Results   Component Value Date    HGBA1C 6.5 07/09/2025        Hematology:  Lab Results   Component Value Date    WBC 3.80 (L) 07/09/2025    HGB 15.1 07/09/2025    HCT 46.3 07/09/2025     (L) 07/09/2025       Lipid Panel:  Lab Results   Component Value Date    CHOL 212 (H) 07/09/2025    HDL 62 (H) 07/09/2025    .00 07/09/2025    TRIG 94 07/09/2025    TOTALCHOLEST 3 07/09/2025        Urine:  Lab Results   Component Value Date    APPEARANCEUA Slightly Cloudy (A) 07/09/2025    SGUA 1.020 07/09/2025    PROTEINUA Trace (A) 07/09/2025    KETONESUA Negative 07/09/2025    LEUKOCYTESUR Negative 07/09/2025    RBCUA 0-2 07/09/2025    WBCUA 0-2 07/09/2025    BACTERIA Few (A) 07/09/2025    CREATRANDUR 128.7 (H) 07/09/2025        Assessment:       ICD-10-CM ICD-9-CM   1. Type 2 diabetes mellitus without complication, without long-term current use of insulin  E11.9 250.00   2. Anxiety  F41.9 300.00   3. Nausea and vomiting, unspecified vomiting type  R11.2 787.01   4. Macrocytic anemia  D53.9 281.9   5. Muscle spasm  M62.838 728.85   6. Sciatica of right side  M54.31 724.3        Plan:     1. Type 2 diabetes mellitus without complication, without long-term current use of insulin  Overview:  , did eat, will increase her dose of metformin and ozempic. Advised to check morning glucose.   Lab Results   Component Value Date    HGBA1C 7.8 (H) 07/01/2024    HGBA1C 6.9 03/28/2024    .00 07/01/2024    CREATININE 0.83 07/01/2024      Diabetes Medications               metFORMIN (GLUCOPHAGE) 1000 MG tablet Take 1 tablet (1,000 mg total) by mouth 2 (two) times daily with meals.    OZEMPIC  0.25 mg or 0.5 mg (2 mg/3 mL) pen injector Inject 0.5 mg into the skin every 7 days.          On ACE and Statin according to guidelines.  Discussed caution with SGLT2s + diuretics as concomitant use can cause volume depletion. Discussed caution with DPP-Ivs and HF risk.  Follow ADA Diet. Avoid soda, simple sweets, and limit rice/pasta/breads/starches (no more than 45-50 grams per meal).  Maintain healthy weight with goal BMI <30.  Exercise 5 times per week for 30 minutes per day.  Stressed importance of daily foot exams.  Stressed importance of annual dilated eye exam.          Assessment & Plan:  A1C is 6.5, Will order trulicity.     Orders:  -      DIABETES FOOT EXAM  -     POCT Glucose, Hand-Held Device  -     dulaglutide (TRULICITY) 1.5 mg/0.5 mL pen injector; Inject 1.5 mg into the skin every 7 days.  Dispense: 4 pen ; Refill: 11    2. Anxiety  Overview:  Anxiety- citalopram dose increased to 20mg.    Assessment & Plan:  Increased anxiety, crying, feeling overwhelmed, advised BH referral, increased celexa dose. RTC in 2 weeks for reassessment.    Orders:  -     clonazePAM (KLONOPIN) 1 MG tablet; Take 1 tablet (1 mg total) by mouth 2 (two) times daily. as needed for anxiety.  Dispense: 60 tablet; Refill: 0  -     citalopram (CELEXA) 40 MG tablet; Take 1 tablet (40 mg total) by mouth once daily.  Dispense: 30 tablet; Refill: 11  -     Ambulatory referral/consult to Behavioral Health; Future; Expected date: 07/17/2025    3. Nausea and vomiting, unspecified vomiting type  Assessment & Plan:  Notes nausea. Will refill her zofran.    Orders:  -     ondansetron (ZOFRAN-ODT) 8 MG TbDL; Take 1 tablet (8 mg total) by mouth every 6 (six) hours as needed (Nausea).  Dispense: 45 tablet; Refill: 0    4. Macrocytic anemia  Overview:  MCV 97.9  and MCH 32.7. Will order folic and B12.    Assessment & Plan:  .5, will order b12/folic.     Orders:  -     Vitamin B12; Future; Expected date: 07/10/2025  -     Folate;  Future; Expected date: 07/10/2025    5. Muscle spasm  -     cyclobenzaprine (FLEXERIL) 10 MG tablet; Take 1 tablet (10 mg total) by mouth 3 (three) times daily as needed for Muscle spasms.  Dispense: 45 tablet; Refill: 2    6. Sciatica of right side  -     cyclobenzaprine (FLEXERIL) 10 MG tablet; Take 1 tablet (10 mg total) by mouth 3 (three) times daily as needed for Muscle spasms.  Dispense: 45 tablet; Refill: 2  -     X-Ray Lumbar Spine AP And Lateral; Future; Expected date: 07/10/2025  -     ketorolac injection 30 mg    I spent a total of 65 minutes on the day of the visit.This includes face to face time and non-face to face time preparing to see the patient (eg, review of tests), obtaining and/or reviewing separately obtained history, documenting clinical information in the electronic or other health record, independently interpreting results and communicating results to the patient/family/caregiver, or care coordinator.    No follow-ups on file. In addition to their scheduled follow up, the patient has also been instructed to follow up on as needed basis.     Future Appointments   Date Time Provider Department Center   8/18/2025  9:15 AM [facility] MAMMO2 [facility] [place]   10/13/2025 10:30 AM Nelia Puckett FNP [facility] GASTRO [place]   4/7/2026  9:15 AM Renee Love FNP [facility] FAMMED [facility]

## 2025-07-28 DIAGNOSIS — R07.81 RIB PAIN: Primary | ICD-10-CM

## 2025-07-29 ENCOUNTER — HOSPITAL ENCOUNTER (OUTPATIENT)
Dept: RADIOLOGY | Facility: HOSPITAL | Age: 55
Discharge: HOME | End: 2025-07-29
Attending: NURSE PRACTITIONER
Payer: MEDICAID

## 2025-07-29 DIAGNOSIS — R07.81 RIB PAIN: ICD-10-CM

## 2025-07-29 DIAGNOSIS — M54.31 SCIATICA OF RIGHT SIDE: ICD-10-CM

## 2025-07-29 PROCEDURE — 72100 X-RAY EXAM L-S SPINE 2/3 VWS: CPT | Mod: TC

## 2025-07-29 PROCEDURE — 71110 X-RAY EXAM RIBS BIL 3 VIEWS: CPT | Mod: TC

## 2025-08-01 ENCOUNTER — OFFICE VISIT (OUTPATIENT)
Dept: FAMILY MEDICINE | Facility: CLINIC | Age: 55
End: 2025-08-01
Payer: MEDICAID

## 2025-08-01 ENCOUNTER — HOSPITAL ENCOUNTER (OUTPATIENT)
Dept: RADIOLOGY | Facility: HOSPITAL | Age: 55
Discharge: HOME | End: 2025-08-01
Attending: NURSE PRACTITIONER
Payer: MEDICAID

## 2025-08-01 VITALS
HEIGHT: 66 IN | HEART RATE: 87 BPM | RESPIRATION RATE: 16 BRPM | OXYGEN SATURATION: 99 % | DIASTOLIC BLOOD PRESSURE: 84 MMHG | SYSTOLIC BLOOD PRESSURE: 138 MMHG | TEMPERATURE: 97 F | WEIGHT: 225 LBS | BODY MASS INDEX: 36.16 KG/M2

## 2025-08-01 DIAGNOSIS — Z13.820 ENCOUNTER FOR OSTEOPOROSIS SCREENING IN ASYMPTOMATIC POSTMENOPAUSAL PATIENT: ICD-10-CM

## 2025-08-01 DIAGNOSIS — R07.81 RIB PAIN ON LEFT SIDE: Primary | ICD-10-CM

## 2025-08-01 DIAGNOSIS — Z78.0 ENCOUNTER FOR OSTEOPOROSIS SCREENING IN ASYMPTOMATIC POSTMENOPAUSAL PATIENT: ICD-10-CM

## 2025-08-01 DIAGNOSIS — R07.81 RIB PAIN ON LEFT SIDE: ICD-10-CM

## 2025-08-01 PROCEDURE — 71100 X-RAY EXAM RIBS UNI 2 VIEWS: CPT | Mod: TC,LT

## 2025-08-01 PROCEDURE — 99215 OFFICE O/P EST HI 40 MIN: CPT | Mod: PBBFAC,25 | Performed by: NURSE PRACTITIONER

## 2025-08-01 PROCEDURE — 99999 PR PBB SHADOW E&M-EST. PATIENT-LVL V: CPT | Mod: PBBFAC,,, | Performed by: NURSE PRACTITIONER

## 2025-08-01 RX ORDER — METAXALONE 400 MG/1
800 TABLET ORAL 3 TIMES DAILY
Qty: 60 TABLET | Refills: 0 | Status: CANCELLED | OUTPATIENT
Start: 2025-08-01 | End: 2025-08-11

## 2025-08-01 RX ORDER — HYDROCODONE BITARTRATE AND ACETAMINOPHEN 10; 325 MG/1; MG/1
1 TABLET ORAL EVERY 12 HOURS PRN
Qty: 10 TABLET | Refills: 0 | Status: SHIPPED | OUTPATIENT
Start: 2025-08-01

## 2025-08-01 RX ORDER — KETOROLAC TROMETHAMINE 30 MG/ML
30 INJECTION, SOLUTION INTRAMUSCULAR; INTRAVENOUS
Status: COMPLETED | OUTPATIENT
Start: 2025-08-01 | End: 2025-08-01

## 2025-08-01 RX ADMIN — KETOROLAC TROMETHAMINE 30 MG: 30 INJECTION, SOLUTION INTRAMUSCULAR; INTRAVENOUS at 10:08

## 2025-08-01 NOTE — ASSESSMENT & PLAN NOTE
Continues with pain to the left lower rib, ordered 2nd rib series, is negative, ordered DEXA for screening for osteoporosis for more in depth per recommendation of RAD.

## 2025-08-01 NOTE — PROGRESS NOTES
"  Internal Medicine    Patient ID: [id number]     Chief Complaint: Chest Pain and Results      HPI:     Raquel Byrne is a 55 y.o. female here today for a follow up.     History of Present Illness    CHIEF COMPLAINT:  Patient presents today for acute chest wall pain after hugging incident.    CHEST WALL PAIN:  She reports progressive chest wall pain located under the left breast. Pain is described as stabbing and burning, with severity of 8-9/10 during movement. The pain is worsening and significantly impacts mobility, with difficulty getting up without vocalization of discomfort. Pain is exacerbated when laying on the affected side and turning at night. During a recent hug, she felt as if something had "come through" her chest, comparing the sensation to being burned with a flame. She expresses significant distress, stating the pain is so severe she momentarily thought she was experiencing a heart attack. Current treatments are ineffective in managing the pain.    BACK AND BUTTOCK PAIN:  She reports ongoing back and buttock pain with significant functional limitations. Pain is severe, rating 8-9 out of 10, causing difficulty bending over and standing up. She experiences notable discomfort when transitioning from sitting to standing, often vocalizing pain. She takes Flexeril for buttock pain with minimal relief. She also experiences numbness in feet with prolonged standing.    FATIGUE AND ASSOCIATED SYMPTOMS:  She reports significant daily fatigue severe enough that she has fallen asleep involuntarily during routine activities such as sitting at the dinner table. Associated symptoms include weakness, pale skin, shortness of breath, dizziness, and palpitations. She acknowledges these symptoms are related to her macrocytic anemia.    MEDICAL HISTORY:  She has a history of osteopenia currently being monitored, previous discectomy, and has been in menopause for almost two years with cessation of menstrual " periods.    LABS:  Vitamin B12 level low normal at 9.8. Folic acid level 4.61, within normal range of 2.13-8.17.      ROS:  General: -fever, -chills, +fatigue, -weight gain, -weight loss  Eyes: -vision changes, -redness, -discharge  ENT: -ear pain, -nasal congestion, -sore throat  Cardiovascular: -chest pain, -palpitations, -lower extremity edema  Respiratory: -cough, +shortness of breath  Gastrointestinal: -abdominal pain, -nausea, -vomiting, -diarrhea, -constipation, -blood in stool  Genitourinary: -dysuria, -hematuria, -frequency  Musculoskeletal: -joint pain, -muscle pain, +difficulty standing up, +pain with movement, +body aches, +back pain  Skin: -rash, -lesion  Neurological: -headache, -dizziness, +numbness, -tingling  Psychiatric: -anxiety, -depression, -sleep difficulty  Breasts: +breast pain  Female Genitourinary: +absent or irregular periods          Past Medical History:   Diagnosis Date    Abdominal pain 07/26/2023    Abnormal vaginal bleeding     Anxiety     Candidiasis of breast 07/26/2023    Cervical cancer screening 07/26/2023    Constipation     Hypertension     Internal hemorrhoids without complication 11/01/2024    Medial meniscus tear     right knee    Muscle spasm 05/09/2024    Having muscle spasms.  Started 2 to 3 weeks ago she says.  I will run a CBC and a magnesium level.      Nausea and vomiting 07/26/2023    Ovarian cyst     Screening for colon cancer 04/10/2023    Positive ASCUS HPV PAP 8/23/23, PAP today,neg HPV, continues to spot during her cycle, referred to GYN.  No cervical discharge, lesions or tenderness. No vaginal discharge or lesions. No labial lesions, erythema. (Chaperone present)       Torn ACL     right knee        Past Surgical History:   Procedure Laterality Date    ANTERIOR CRUCIATE LIGAMENT REPAIR      CHOLECYSTECTOMY      HYSTEROSCOPY WITH DILATION AND CURETTAGE OF UTERUS N/A 08/28/2024    Procedure: HYSTEROSCOPY, WITH DILATION AND CURETTAGE OF UTERUS;  Surgeon: Liliana  MD Jose Luis;  Location: [facility] OR;  Service: OB/GYN;  Laterality: N/A;    INCISION AND DRAINAGE Left     leg left    LAPAROSCOPIC CHOLECYSTECTOMY WITH CHOLANGIOGRAPHY N/A 2023    Procedure: CHOLECYSTECTOMY, LAPAROSCOPIC, WITH CHOLANGIOGRAM;  Surgeon: Prabhu Hernandes MD;  Location: [facility] OR;  Service: General;  Laterality: N/A;    TUBAL LIGATION          Social History     Tobacco Use    Smoking status: Former     Current packs/day: 0.00     Average packs/day: 0.3 packs/day for 23.0 years (5.8 ttl pk-yrs)     Types: Cigarettes     Start date: 1991     Quit date:      Years since quittin.5    Smokeless tobacco: Never   Substance and Sexual Activity    Alcohol use: Never    Drug use: Not Currently     Types: Marijuana     Comment: Medical    Sexual activity: Not Currently     Partners: Male     Birth control/protection: See Surgical Hx        Current Outpatient Medications   Medication Instructions    amitriptyline (ELAVIL) 25 mg, Oral, Nightly PRN    atorvastatin (LIPITOR) 40 mg, Oral, Nightly    blood sugar diagnostic Strp To check BG one times daily, to use with insurance preferred meter    blood-glucose meter kit To check BG one times daily, to use with insurance preferred meter    citalopram (CELEXA) 10 mg, Oral, Daily    citalopram (CELEXA) 40 mg, Oral, Daily    clonazePAM (KLONOPIN) 1 mg, Oral, 2 times daily, as needed for anxiety.    cyclobenzaprine (FLEXERIL) 10 mg, Oral, 3 times daily PRN    doxycycline (VIBRAMYCIN) 100 mg, Oral, 2 times daily    empagliflozin (JARDIANCE) 10 mg, Oral, Daily    HYDROcodone-acetaminophen (NORCO)  mg per tablet 1 tablet, Oral, Every 12 hours PRN    IBSRELA 50 mg, Oral, 2 times daily    lancets Misc To check BG one times daily, to use with insurance preferred meter    metFORMIN (GLUCOPHAGE-XR) 500 mg, Oral, 2 times daily with meals    metoprolol succinate (TOPROL-XL) 25 mg, Oral    metoprolol tartrate (LOPRESSOR) 25 MG tablet 1 tablet, Every morning     "mupirocin (BACTROBAN) 2 % ointment Topical (Top), 3 times daily    naloxone (NARCAN) 4 mg/actuation [place] Call 911. Administer a single spray intranasally into one nostril upon signs of opioid overdose. May repeat after 3 minutes if no response.    ondansetron (ZOFRAN-ODT) 8 mg, Oral, Every 6 hours PRN    ONETOUCH DELICA PLUS LANCET 30 gauge Misc     ONETOUCH ULTRA2 METER Misc USE 1 TO CHECK GLUCOSE ONCE DAILY    sumatriptan (IMITREX) 100 mg, Oral, Every 2 hours PRN, Not to exceed 200mg in 24 hours    TRULICITY 1.5 mg, Subcutaneous, Every 7 days       Review of patient's allergies indicates:   Allergen Reactions    Latex, natural rubber Hives, Itching and Swelling    Latex Nausea And Vomiting        Patient Care Team:  Renee Love FNP as PCP - General (Family Medicine)  Raji Gomes MD as Consulting Physician (Orthopedic Surgery)  [facility] (Physical Therapy)  [facility] as Nurse Practitioner (Behavioral Health)     Subjective:     12 point review of systems conducted, negative except as stated in the history of present illness. See HPI for details.    Objective:     Visit Vitals  /84   Pulse 87   Temp 97 °F (36.1 °C) (Temporal)   Resp 16   Ht 5' 6" (1.676 m)   Wt 102.1 kg (225 lb)   SpO2 99%   BMI 36.32 kg/m²       Physical Exam  Vitals and nursing note reviewed.   Constitutional:       General: She is not in acute distress.     Appearance: She is not ill-appearing.   HENT:      Head: Normocephalic and atraumatic.      Mouth/Throat:      Mouth: Mucous membranes are moist.      Pharynx: Oropharynx is clear.   Eyes:      General: No scleral icterus.     Extraocular Movements: Extraocular movements intact.      Conjunctiva/sclera: Conjunctivae normal.      Pupils: Pupils are equal, round, and reactive to light.   Neck:      Vascular: No carotid bruit.   Cardiovascular:      Rate and Rhythm: Normal rate and regular rhythm.      " Heart sounds: No murmur heard.     No friction rub. No gallop.   Pulmonary:      Effort: Pulmonary effort is normal. No respiratory distress.      Breath sounds: Normal breath sounds. No wheezing, rhonchi or rales.   Abdominal:      General: Abdomen is flat. Bowel sounds are normal. There is no distension.      Palpations: Abdomen is soft. There is no mass.      Tenderness: There is no abdominal tenderness.   Musculoskeletal:         General: Normal range of motion.      Cervical back: Normal range of motion and neck supple.   Skin:     General: Skin is warm and dry.   Neurological:      General: No focal deficit present.      Mental Status: She is alert.   Psychiatric:         Mood and Affect: Mood normal.         Labs Reviewed:     Chemistry:  Lab Results   Component Value Date     07/09/2025    K 4.1 07/09/2025    BUN 11.0 07/09/2025    CREATININE 0.71 07/09/2025    EGFRNORACEVR >60 07/09/2025    CALCIUM 9.8 07/09/2025    ALKPHOS 93 07/09/2025    LABPROT 10.8 10/22/2024    ALBUMIN 3.7 07/09/2025    AST 61 (H) 07/09/2025    ALT 50 07/09/2025    MG 2.00 05/09/2024    TSH 2.053 07/09/2025    ADKZIN1AVYT 1.04 09/17/2024        Lab Results   Component Value Date    HGBA1C 6.5 07/09/2025        Hematology:  Lab Results   Component Value Date    WBC 3.80 (L) 07/09/2025    HGB 15.1 07/09/2025    HCT 46.3 07/09/2025     (L) 07/09/2025       Lipid Panel:  Lab Results   Component Value Date    CHOL 212 (H) 07/09/2025    HDL 62 (H) 07/09/2025    TRIG 94 07/09/2025    TOTALCHOLEST 3 07/09/2025    LDLCALC 131.00 07/09/2025        Urine:  Lab Results   Component Value Date    APPEARANCEUA Slightly Cloudy (A) 07/09/2025    SGUA 1.020 07/09/2025    PROTEINUA Trace (A) 07/09/2025    KETONESUA Negative 07/09/2025    LEUKOCYTESUR Negative 07/09/2025    RBCUA 0-2 07/09/2025    WBCUA 0-2 07/09/2025    BACTERIA Few (A) 07/09/2025    CREATRANDUR 128.7 (H) 07/09/2025        Assessment/Plan:     1. Rib pain on left  side  Assessment & Plan:  Continues with pain to the left lower rib, ordered 2nd rib series, is negative, ordered DEXA for screening for osteoporosis for more in depth per recommendation of RAD.    Orders:  -     X-Ray Ribs 2 View Left; Future; Expected date: 08/01/2025  -     DXA Bone Density Axial Skeleton 1 or more sites; Future; Expected date: 08/01/2025  -     HYDROcodone-acetaminophen (NORCO)  mg per tablet; Take 1 tablet by mouth every 12 (twelve) hours as needed for Pain (No clonazapam whil taking norco).  Dispense: 10 tablet; Refill: 0  -     ketorolac injection 30 mg    2. Encounter for osteoporosis screening in asymptomatic postmenopausal patient  Assessment & Plan:  Xray shows osteopenia, DEXA ordered.    Orders:  -     DXA Bone Density Axial Skeleton 1 or more sites; Future; Expected date: 08/01/2025         Follow up in about 4 days (around 8/5/2025) for routine follow up. In addition to their scheduled follow up, the patient has also been instructed to follow up on as needed basis.     Future Appointments   Date Time Provider Department Center   8/7/2025  9:00 AM [facility] X7 [id number] [facility] XRAY [place]   8/18/2025  9:15 AM AG MAMMO2 [facility] MAMMO [place]   9/16/2025  9:00 AM Anya Cherry APRN [facility]   10/13/2025 10:30 AM Nelia Puckett FNP [facility]   4/7/2026  9:15 AM Renee Love FNP [facility] [place]    This note was generated with the assistance of ambient listening technology. Verbal consent was obtained by the patient and accompanying visitor(s) for the recording of patient appointment to facilitate this note. I attest to having reviewed and edited the generated note for accuracy, though some syntax or spelling errors may persist. Please contact the author of this note for any clarification.

## 2025-08-07 ENCOUNTER — HOSPITAL ENCOUNTER (OUTPATIENT)
Dept: RADIOLOGY | Facility: HOSPITAL | Age: 55
Discharge: HOME | End: 2025-08-07
Attending: NURSE PRACTITIONER
Payer: MEDICAID

## 2025-08-07 DIAGNOSIS — Z78.0 ENCOUNTER FOR OSTEOPOROSIS SCREENING IN ASYMPTOMATIC POSTMENOPAUSAL PATIENT: ICD-10-CM

## 2025-08-07 DIAGNOSIS — Z13.820 ENCOUNTER FOR OSTEOPOROSIS SCREENING IN ASYMPTOMATIC POSTMENOPAUSAL PATIENT: ICD-10-CM

## 2025-08-07 DIAGNOSIS — R07.81 RIB PAIN ON LEFT SIDE: ICD-10-CM

## 2025-08-07 PROCEDURE — 77080 DXA BONE DENSITY AXIAL: CPT | Mod: TC

## 2025-08-11 ENCOUNTER — OFFICE VISIT (OUTPATIENT)
Dept: FAMILY MEDICINE | Facility: CLINIC | Age: 55
End: 2025-08-11
Payer: MEDICAID

## 2025-08-11 VITALS
RESPIRATION RATE: 16 BRPM | DIASTOLIC BLOOD PRESSURE: 93 MMHG | SYSTOLIC BLOOD PRESSURE: 164 MMHG | TEMPERATURE: 98 F | BODY MASS INDEX: 36.06 KG/M2 | HEIGHT: 66 IN | OXYGEN SATURATION: 98 % | HEART RATE: 78 BPM | WEIGHT: 224.38 LBS

## 2025-08-11 DIAGNOSIS — M85.88 OSTEOPENIA OF LUMBAR SPINE: ICD-10-CM

## 2025-08-11 DIAGNOSIS — M53.3 CHRONIC SI JOINT PAIN: Primary | ICD-10-CM

## 2025-08-11 DIAGNOSIS — G89.29 CHRONIC SI JOINT PAIN: Primary | ICD-10-CM

## 2025-08-11 DIAGNOSIS — K58.1 IRRITABLE BOWEL SYNDROME WITH CONSTIPATION: ICD-10-CM

## 2025-08-11 PROCEDURE — 99215 OFFICE O/P EST HI 40 MIN: CPT | Mod: PBBFAC | Performed by: NURSE PRACTITIONER

## 2025-08-11 PROCEDURE — 3061F NEG MICROALBUMINURIA REV: CPT | Mod: CPTII,,, | Performed by: NURSE PRACTITIONER

## 2025-08-11 PROCEDURE — 3066F NEPHROPATHY DOC TX: CPT | Mod: CPTII,,, | Performed by: NURSE PRACTITIONER

## 2025-08-11 PROCEDURE — 3080F DIAST BP >= 90 MM HG: CPT | Mod: CPTII,,, | Performed by: NURSE PRACTITIONER

## 2025-08-11 PROCEDURE — 99214 OFFICE O/P EST MOD 30 MIN: CPT | Mod: S$PBB,,, | Performed by: NURSE PRACTITIONER

## 2025-08-11 PROCEDURE — 3044F HG A1C LEVEL LT 7.0%: CPT | Mod: CPTII,,, | Performed by: NURSE PRACTITIONER

## 2025-08-11 PROCEDURE — G2211 COMPLEX E/M VISIT ADD ON: HCPCS | Mod: ,,, | Performed by: NURSE PRACTITIONER

## 2025-08-11 PROCEDURE — 3008F BODY MASS INDEX DOCD: CPT | Mod: CPTII,,, | Performed by: NURSE PRACTITIONER

## 2025-08-11 PROCEDURE — 3077F SYST BP >= 140 MM HG: CPT | Mod: CPTII,,, | Performed by: NURSE PRACTITIONER

## 2025-08-11 PROCEDURE — 99999 PR PBB SHADOW E&M-EST. PATIENT-LVL V: CPT | Mod: PBBFAC,,, | Performed by: NURSE PRACTITIONER

## 2025-08-11 PROCEDURE — 1159F MED LIST DOCD IN RCRD: CPT | Mod: CPTII,,, | Performed by: NURSE PRACTITIONER

## 2025-08-11 PROCEDURE — 1160F RVW MEDS BY RX/DR IN RCRD: CPT | Mod: CPTII,,, | Performed by: NURSE PRACTITIONER

## 2025-08-11 RX ORDER — PLECANATIDE 3 MG/1
3 TABLET ORAL DAILY
Qty: 100 TABLET | Refills: 2 | Status: SHIPPED | OUTPATIENT
Start: 2025-08-11

## 2025-08-11 RX ORDER — SUZETRIGINE 50 MG/1
50 TABLET, FILM COATED ORAL 2 TIMES DAILY
Qty: 22 TABLET | Refills: 0 | Status: SHIPPED | OUTPATIENT
Start: 2025-08-11 | End: 2025-08-25

## 2025-08-18 ENCOUNTER — HOSPITAL ENCOUNTER (OUTPATIENT)
Dept: RADIOLOGY | Facility: HOSPITAL | Age: 55
Discharge: HOME | End: 2025-08-18
Attending: NURSE PRACTITIONER
Payer: MEDICAID

## 2025-08-18 DIAGNOSIS — Z12.31 BREAST CANCER SCREENING BY MAMMOGRAM: ICD-10-CM

## 2025-08-18 PROCEDURE — 77067 SCR MAMMO BI INCL CAD: CPT | Mod: TC

## 2025-08-18 PROCEDURE — 77067 SCR MAMMO BI INCL CAD: CPT | Mod: 26,,, | Performed by: STUDENT IN AN ORGANIZED HEALTH CARE EDUCATION/TRAINING PROGRAM

## 2025-08-18 PROCEDURE — 77063 BREAST TOMOSYNTHESIS BI: CPT | Mod: 26,,, | Performed by: STUDENT IN AN ORGANIZED HEALTH CARE EDUCATION/TRAINING PROGRAM

## 2025-08-19 ENCOUNTER — RESULTS FOLLOW-UP (OUTPATIENT)
Dept: FAMILY MEDICINE | Facility: CLINIC | Age: 55
End: 2025-08-19
Payer: MEDICAID

## 2025-08-19 ENCOUNTER — TELEPHONE (OUTPATIENT)
Dept: FAMILY MEDICINE | Facility: CLINIC | Age: 55
End: 2025-08-19
Payer: MEDICAID

## 2025-08-22 DIAGNOSIS — F41.9 ANXIETY: ICD-10-CM

## 2025-08-22 RX ORDER — CLONAZEPAM 1 MG/1
1 TABLET ORAL 2 TIMES DAILY
Qty: 60 TABLET | Refills: 1 | Status: SHIPPED | OUTPATIENT
Start: 2025-08-22

## (undated) DEVICE — TRAY CATH URETHRAL FOLEY 16FR

## (undated) DEVICE — ELECTRODE REM PLYHSV RETURN 9

## (undated) DEVICE — SUT GUT PL. 4-0 27 FS-2

## (undated) DEVICE — DRAPE C ARM 42 X 120 10/BX

## (undated) DEVICE — GOWN POLY REINF BRTH SLV 2XL

## (undated) DEVICE — JELLY KY LUBRICATING 5G PACKET

## (undated) DEVICE — DEVICE MYOSURE REACH SYS

## (undated) DEVICE — ELECTRODE MEGADYNE L-WIRE 33CM

## (undated) DEVICE — Device

## (undated) DEVICE — SYR LUER LOCK 20ML

## (undated) DEVICE — PACK ECLIPSE BASIC III SURG

## (undated) DEVICE — SOL NACL IRR 1000ML BTL

## (undated) DEVICE — SYR 10CC LUER LOCK

## (undated) DEVICE — DRAPE UNDER BUTTOCKS SUC PORT

## (undated) DEVICE — SLEEVE KII ADV FIX 5X100MM

## (undated) DEVICE — NDL PNEUMO INSUFFLATI 120MM

## (undated) DEVICE — SET TUB INSUFFLATION SUC 20L

## (undated) DEVICE — DRAPE INCISE IOBAN 2 23X17IN

## (undated) DEVICE — CONTRAST ISOVUE 300 50ML

## (undated) DEVICE — SUT 0 VICRYL / UR6 (J603)

## (undated) DEVICE — SPONGE GAUZE 16PLY 4X4

## (undated) DEVICE — GLOVE PROTEXIS NEOPRN SZ6.5

## (undated) DEVICE — HOLDER SCALPEL SURGICAL GOLD

## (undated) DEVICE — GLOVE PROTEXIS HYDROGEL SZ6.5

## (undated) DEVICE — ADHESIVE DERMABOND ADVANCED

## (undated) DEVICE — TROCAR KII SHLD BLDED 5X100MM

## (undated) DEVICE — DISSECTOR EPIX LAPA 5MMX35CM

## (undated) DEVICE — SEAL LENS SCOPE MYOSURE

## (undated) DEVICE — SET EXT MACROBORE 15IN

## (undated) DEVICE — NDL SAFETY 22G X 1.5 ECLIPSE

## (undated) DEVICE — TUBE AQUILEX VACUUM SET HI LO

## (undated) DEVICE — KIT ANTIFOG W/SPONG & FLUID

## (undated) DEVICE — DRAPE DEVON INSTRUMENT 10X16IN

## (undated) DEVICE — KIT MAJOR SINGLE BASIN

## (undated) DEVICE — IRRIGATOR HYDRO-SURG PLUS 5MM

## (undated) DEVICE — SPONGE LAP 18X18 PREWASHED

## (undated) DEVICE — TUBING SUCTION CONNECTING 10FT

## (undated) DEVICE — NDL GRANEE OPEN LOOP GRASPER

## (undated) DEVICE — CATH CHOLANGIOGRAM 13IN

## (undated) DEVICE — GLOVE PROTEXIS PI SYN SURG 8.5

## (undated) DEVICE — DRESSING TELFA N ADH 3X8IN

## (undated) DEVICE — SCISSOR 5MMX35CM DIRECT DRIVE

## (undated) DEVICE — TRAY DRY SKIN SCRUB PREP

## (undated) DEVICE — APPLIER CLIP EPIX UNIV 5X34

## (undated) DEVICE — DRAPE LEGGINGS CUFF 31X48IN

## (undated) DEVICE — BAG SPEC RETRV ENDO 4X6IN DISP